# Patient Record
Sex: FEMALE | Race: ASIAN | NOT HISPANIC OR LATINO
[De-identification: names, ages, dates, MRNs, and addresses within clinical notes are randomized per-mention and may not be internally consistent; named-entity substitution may affect disease eponyms.]

---

## 2022-07-08 ENCOUNTER — LABORATORY RESULT (OUTPATIENT)
Age: 32
End: 2022-07-08

## 2022-07-08 ENCOUNTER — APPOINTMENT (OUTPATIENT)
Dept: RHEUMATOLOGY | Facility: CLINIC | Age: 32
End: 2022-07-08

## 2022-07-08 VITALS
OXYGEN SATURATION: 99 % | HEART RATE: 77 BPM | WEIGHT: 130 LBS | DIASTOLIC BLOOD PRESSURE: 75 MMHG | SYSTOLIC BLOOD PRESSURE: 111 MMHG | RESPIRATION RATE: 16 BRPM | TEMPERATURE: 98 F | BODY MASS INDEX: 23.92 KG/M2 | HEIGHT: 62 IN

## 2022-07-08 PROCEDURE — 99205 OFFICE O/P NEW HI 60 MIN: CPT

## 2022-07-09 LAB
25(OH)D3 SERPL-MCNC: 36.8 NG/ML
ALBUMIN SERPL ELPH-MCNC: 4.3 G/DL
ALP BLD-CCNC: 50 U/L
ALT SERPL-CCNC: 17 U/L
ANION GAP SERPL CALC-SCNC: 15 MMOL/L
APPEARANCE: CLEAR
APTT BLD: 33.9 SEC
AST SERPL-CCNC: 22 U/L
BACTERIA: NEGATIVE
BASOPHILS # BLD AUTO: 0.03 K/UL
BASOPHILS NFR BLD AUTO: 0.5 %
BILIRUB SERPL-MCNC: 0.3 MG/DL
BILIRUBIN URINE: NEGATIVE
BLOOD URINE: NEGATIVE
BUN SERPL-MCNC: 20 MG/DL
CALCIUM SERPL-MCNC: 9.3 MG/DL
CHLORIDE SERPL-SCNC: 107 MMOL/L
CHOLEST SERPL-MCNC: 174 MG/DL
CK SERPL-CCNC: 146 U/L
CO2 SERPL-SCNC: 19 MMOL/L
COLOR: NORMAL
CREAT SERPL-MCNC: 1.4 MG/DL
CREAT SPEC-SCNC: 49 MG/DL
CREAT/PROT UR: 0.3 RATIO
EGFR: 52 ML/MIN/1.73M2
EOSINOPHIL # BLD AUTO: 0.14 K/UL
EOSINOPHIL NFR BLD AUTO: 2.5 %
ESTIMATED AVERAGE GLUCOSE: 91 MG/DL
FOLATE SERPL-MCNC: >20 NG/ML
GLUCOSE QUALITATIVE U: NEGATIVE
GLUCOSE SERPL-MCNC: 78 MG/DL
HAPTOGLOB SERPL-MCNC: 49 MG/DL
HBA1C MFR BLD HPLC: 4.8 %
HCT VFR BLD CALC: 32.3 %
HDLC SERPL-MCNC: 67 MG/DL
HGB BLD-MCNC: 11.1 G/DL
HYALINE CASTS: 2 /LPF
IMM GRANULOCYTES NFR BLD AUTO: 0.5 %
INR PPP: 0.9 RATIO
IRON SATN MFR SERPL: 32 %
IRON SERPL-MCNC: 103 UG/DL
KETONES URINE: NEGATIVE
LDH SERPL-CCNC: 253 U/L
LDLC SERPL CALC-MCNC: 67 MG/DL
LEUKOCYTE ESTERASE URINE: NEGATIVE
LYMPHOCYTES # BLD AUTO: 1.03 K/UL
LYMPHOCYTES NFR BLD AUTO: 18.1 %
MAN DIFF?: NORMAL
MCHC RBC-ENTMCNC: 32.9 PG
MCHC RBC-ENTMCNC: 34.4 GM/DL
MCV RBC AUTO: 95.8 FL
MICROSCOPIC-UA: NORMAL
MONOCYTES # BLD AUTO: 0.3 K/UL
MONOCYTES NFR BLD AUTO: 5.3 %
NEUTROPHILS # BLD AUTO: 4.16 K/UL
NEUTROPHILS NFR BLD AUTO: 73.1 %
NITRITE URINE: NEGATIVE
NONHDLC SERPL-MCNC: 107 MG/DL
PH URINE: 6
PLATELET # BLD AUTO: 207 K/UL
POTASSIUM SERPL-SCNC: 4.5 MMOL/L
PROT SERPL-MCNC: 6.8 G/DL
PROT UR-MCNC: 14 MG/DL
PROTEIN URINE: NORMAL
PT BLD: 10.6 SEC
RBC # BLD: 3.37 M/UL
RBC # BLD: 3.37 M/UL
RBC # FLD: 13.8 %
RED BLOOD CELLS URINE: 4 /HPF
RETICS # AUTO: 1.8 %
RETICS AGGREG/RBC NFR: 61.8 K/UL
RHEUMATOID FACT SER QL: 11 IU/ML
SODIUM SERPL-SCNC: 140 MMOL/L
SPECIFIC GRAVITY URINE: 1.01
SQUAMOUS EPITHELIAL CELLS: 2 /HPF
TIBC SERPL-MCNC: 325 UG/DL
TRIGL SERPL-MCNC: 202 MG/DL
TSH SERPL-ACNC: 0.38 UIU/ML
UIBC SERPL-MCNC: 222 UG/DL
UROBILINOGEN URINE: NORMAL
VIT B12 SERPL-MCNC: 413 PG/ML
WBC # FLD AUTO: 5.69 K/UL
WHITE BLOOD CELLS URINE: 2 /HPF

## 2022-07-10 LAB
B2 GLYCOPROT1 IGA SERPL IA-ACNC: <5 SAU
B2 GLYCOPROT1 IGG SER-ACNC: <5 SGU
B2 GLYCOPROT1 IGM SER-ACNC: <5 SMU
C3 SERPL-MCNC: 109 MG/DL
C4 SERPL-MCNC: 26 MG/DL
CARDIOLIPIN IGM SER-MCNC: 6.3 GPL
DEPRECATED CARDIOLIPIN IGA SER: <5 APL
MYELOPEROXIDASE AB SER QL IA: <5 UNITS
MYELOPEROXIDASE CELLS FLD QL: NEGATIVE
PROTEINASE3 AB SER IA-ACNC: <5 UNITS
PROTEINASE3 AB SER-ACNC: NEGATIVE

## 2022-07-11 LAB
ANA SER IF-ACNC: NEGATIVE
CONFIRM: 31 SEC
DIRECT COOMBS: NORMAL
DRVVT IMM 1:2 NP PPP: NORMAL
DRVVT SCREEN TO CONFIRM RATIO: 1.13 RATIO
PS IGA SER QL: <1
PS IGG SER QL: 18 UNITS
PS IGM SER QL: 33 UNITS
SCREEN DRVVT: 41.4 SEC
SILICA CLOTTING TIME INTERPRETATION: ABNORMAL
SILICA CLOTTING TIME S/C: 1.31 RATIO

## 2022-07-12 LAB
CCP AB SER IA-ACNC: <8 UNITS
CHROMATIN AB SERPL-ACNC: <0.2 AL
DSDNA AB SER-ACNC: <12 IU/ML
ENA RNP AB SER IA-ACNC: <0.2 AL
ENA SM AB SER IA-ACNC: <0.2 AL
ENA SS-A AB SER IA-ACNC: <0.2 AL
ENA SS-B AB SER IA-ACNC: <0.2 AL
RF+CCP IGG SER-IMP: NEGATIVE

## 2022-07-13 LAB — CARDIOLIPIN IGM SER-MCNC: 11.5 MPL

## 2022-07-20 ENCOUNTER — APPOINTMENT (OUTPATIENT)
Dept: OBGYN | Facility: CLINIC | Age: 32
End: 2022-07-20

## 2022-07-20 ENCOUNTER — ASOB RESULT (OUTPATIENT)
Age: 32
End: 2022-07-20

## 2022-07-20 VITALS
SYSTOLIC BLOOD PRESSURE: 124 MMHG | BODY MASS INDEX: 23.92 KG/M2 | HEIGHT: 63 IN | WEIGHT: 135 LBS | DIASTOLIC BLOOD PRESSURE: 72 MMHG

## 2022-07-20 PROCEDURE — 76801 OB US < 14 WKS SINGLE FETUS: CPT | Mod: 59

## 2022-07-20 PROCEDURE — 76813 OB US NUCHAL MEAS 1 GEST: CPT

## 2022-07-20 PROCEDURE — 0500F INITIAL PRENATAL CARE VISIT: CPT

## 2022-07-20 PROCEDURE — 36415 COLL VENOUS BLD VENIPUNCTURE: CPT

## 2022-07-26 ENCOUNTER — APPOINTMENT (OUTPATIENT)
Dept: RHEUMATOLOGY | Facility: CLINIC | Age: 32
End: 2022-07-26

## 2022-07-26 ENCOUNTER — APPOINTMENT (OUTPATIENT)
Dept: NEPHROLOGY | Facility: CLINIC | Age: 32
End: 2022-07-26

## 2022-07-26 VITALS
SYSTOLIC BLOOD PRESSURE: 119 MMHG | HEART RATE: 71 BPM | BODY MASS INDEX: 24.34 KG/M2 | WEIGHT: 137.35 LBS | TEMPERATURE: 97.7 F | OXYGEN SATURATION: 99 % | HEIGHT: 63 IN | DIASTOLIC BLOOD PRESSURE: 77 MMHG

## 2022-07-26 DIAGNOSIS — Z83.3 FAMILY HISTORY OF DIABETES MELLITUS: ICD-10-CM

## 2022-07-26 DIAGNOSIS — Z82.0 FAMILY HISTORY OF EPILEPSY AND OTHER DISEASES OF THE NERVOUS SYSTEM: ICD-10-CM

## 2022-07-26 PROCEDURE — 99204 OFFICE O/P NEW MOD 45 MIN: CPT

## 2022-07-26 RX ORDER — METHYLDOPA 500 MG
160 (50 FE) TABLET ORAL
Refills: 0 | Status: ACTIVE | COMMUNITY
Start: 2022-07-26

## 2022-07-26 RX ORDER — ERGOCALCIFEROL (VITAMIN D2) 10 MCG
27-0.8 TABLET ORAL
Refills: 0 | Status: ACTIVE | COMMUNITY
Start: 2022-07-26

## 2022-07-26 NOTE — HISTORY OF PRESENT ILLNESS
[FreeTextEntry1] : 31 year old female\par Moved from [place] one month ago\par h/o lupus nephritis\par Lupus hx: In August of 2019 she was hospitalized in [facility] for few hours. She had BP 200s, fevers, elevated creatinine (May 2019 [facility] to [place]) admitted - thought to be secondary to viral or stress as she was travelling back and forth between [facility] and [place]. \par In Dec 2019 she was detected to have proteinuria by GYN; Saw Nephrology at [facility] Dr. Melanie Yang. She reports her Cr 2.3 initially; states approx 10g ? proteinuria. Had edema. BP elevated at that time. Reports lupus serology neg; had renal biopsy Aug 2020 which showed Lupus Nephritis Class 3; \par Initially she was started on steroids which were tapered 60mg x 3 weeks, 40x 3 weeks, 20 x3 weeks Sept 2020,tapered off by April 2021.  She was also started on tacro and MMF.  Tacrolimus was 3/3 tapered and she was weaned off of it summer 2021 due to concern for 'interstitial nephritis.'  Was on Mycophenolate 2 tabs bid which she didn’t tolerate due to GI side effects, and was changed to Myfortic with which she still had diarrhea, vomiting; Then transitioned 3 mos later to myfortic 320mg bid which she didn’t tolerate and it was  switched to Imuran in March 2021.  \par Rheum: Danny William; \par \par Creatinine hx per pt: \par 2019 cr 1.7-1.9\par 2020 cr 2.3 at time of biopsy\par cr downtrended with treatment 1.9 Dec 2020\par cr 1.5 2021 (3 tabs aza)\par cr 1.6-1.7 2022 when aza decreased and approx for one year it has been stable \par \par Proteinuria : Dec 2020 reports resolution of proteinuria 0.1\par june 2020 prot/cr 1.5g\par dec 29 2020 prot.cr 0.1\par june 2022 0.1\par \par HTN history: Was on nifedipine which caused her HR to go up to 170s. Then switched to amlodipine 5mg and lisinopril 2.5mg daily which controlled her BP.  She was taken off Lisinopril  beginning of the year Jan/ Feb 2022and amlodipine was increased to 10mg daily (which she is on currently).  Was on Labetolol 100mg bid; then 50mg bid bc of low HR 60s, uneasiness; Now on Labetalol 100mg daily\par She checks BP 3x/week randomly and reports BPs 120/80 with medications and 130/90 pre meds\par HR 80s\par  \par Pregnancy hx: This is her first pregnancy; Denies miscarriages/abortions. Found out she was pregnant in June 2022; Currently 12 weeks gestation. 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

## 2022-07-26 NOTE — PHYSICAL EXAM
[General Appearance - Alert] : alert [General Appearance - In No Acute Distress] : in no acute distress [General Appearance - Well Nourished] : well nourished [General Appearance - Well Developed] : well developed [Sclera] : the sclera and conjunctiva were normal [Outer Ear] : the ears and nose were normal in appearance [Neck Appearance] : the appearance of the neck was normal [Respiration, Rhythm And Depth] : normal respiratory rhythm and effort [Auscultation Breath Sounds / Voice Sounds] : lungs were clear to auscultation bilaterally [Apical Impulse] : the apical impulse was normal [Heart Rate And Rhythm] : heart rate was normal and rhythm regular [Heart Sounds] : normal S1 and S2 [Edema] : there was no peripheral edema [Bowel Sounds] : normal bowel sounds [Abdomen Tenderness] : non-tender [Abdomen Soft] : soft [No CVA Tenderness] : no ~M costovertebral angle tenderness [Abnormal Walk] : normal gait [Skin Color & Pigmentation] : normal skin color and pigmentation [Skin Turgor] : normal skin turgor [] : no rash [No Focal Deficits] : no focal deficits [Oriented To Time, Place, And Person] : oriented to person, place, and time [Impaired Insight] : insight and judgment were intact [Affect] : the affect was normal

## 2022-07-26 NOTE — REVIEW OF SYSTEMS
IPSS Questionnaire (AUA-7): Over the past month    1)  How often have you had a sensation of not emptying your bladder completely after you finish urinating? 1 - Less than 1 time in 5   2)  How often have you had to urinate again less than two hours after you finished urinating? 2 - Less than half the time   3)  How often have you found you stopped and started again several times when you urinated? 1 - Less than 1 time in 5   4) How difficult have you found it to postpone urination? 2 - Less than half the time   5) How often have you had a weak urinary stream?  0 - Not at all   6) How often have you had to push or strain to begin urination? 1 - Less than 1 time in 5   7) How many times did you most typically get up to urinate from the time you went to bed until the time you got up in the morning?   1 - 1 time   Total Score:  8 [Negative] : Heme/Lymph

## 2022-07-26 NOTE — ASSESSMENT
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; global gs 56% ; focal seg 22%; IFTA mod-severe 50%; mild AS; 15/27 gs glomeruli; full house EM\par -Creatinine hx per pt: \par 2019 cr 1.7-1.9\par 2020 cr 2.3 at time of biopsy\par cr downtrended with treatment 1.9 Dec 2020\par cr 1.5 2021 (3 tabs aza)\par cr 1.6-1.7 2022 when aza decreased and approx for one year it has been stable \par -cr July 9, 2022 1.4 \par -cont plaquenil, Imuran 100mg/day\par \par #proteinuria\par june 2020 prot/cr 1.5g\par dec 29 2020 prot.cr 0.1\par june 2022 0.1\par july 2022 0.3 \par repeat ua, prot/cr today for trend\par pla2r,anca, c3,c4 nl in past; cecilia 1:160 in past\par serology sent at dr forrest office July 2022- mild phosphotidylserine\par \par #HTN- she will monitor BP as she is now; if she notices any elevated BP she will check it more frequently and inform us if elevated\par BP stable today\par on Labetolol 100mg daily and Amlodipine 10mg daily which she takes in evening\par \par #high risk pregnancy- understands of risks of renal dz worsening during pregnancy and risks of pregnancy being complicated by pec or other poor fetal or maternal outcomes\par she is on 1.5tabs of  Asa for PEC prophylaxis\par monitor PEC labs/sx\par \par

## 2022-07-27 ENCOUNTER — TRANSCRIPTION ENCOUNTER (OUTPATIENT)
Age: 32
End: 2022-07-27

## 2022-07-27 LAB
HGB A MFR BLD: 95.8 %
HGB A2 MFR BLD: 2.3 %
HGB F MFR BLD: 1.9 %
HGB FRACT BLD-IMP: NORMAL
LEAD BLD-MCNC: <1 UG/DL

## 2022-07-28 ENCOUNTER — APPOINTMENT (OUTPATIENT)
Dept: CARDIOLOGY | Facility: CLINIC | Age: 32
End: 2022-07-28

## 2022-07-28 ENCOUNTER — NON-APPOINTMENT (OUTPATIENT)
Age: 32
End: 2022-07-28

## 2022-07-29 LAB
APPEARANCE: CLEAR
BACTERIA: NEGATIVE
BILIRUBIN URINE: NEGATIVE
BLOOD URINE: NEGATIVE
COLOR: NORMAL
CREAT SPEC-SCNC: 38 MG/DL
CREAT/PROT UR: 0.2 RATIO
GLUCOSE QUALITATIVE U: NEGATIVE
HYALINE CASTS: 0 /LPF
KETONES URINE: NEGATIVE
LEUKOCYTE ESTERASE URINE: NEGATIVE
MICROSCOPIC-UA: NORMAL
NITRITE URINE: NEGATIVE
PH URINE: 6
PROT UR-MCNC: 8 MG/DL
PROTEIN URINE: NEGATIVE
RED BLOOD CELLS URINE: 0 /HPF
SPECIFIC GRAVITY URINE: 1.01
SQUAMOUS EPITHELIAL CELLS: 0 /HPF
UROBILINOGEN URINE: NORMAL
WHITE BLOOD CELLS URINE: 0 /HPF

## 2022-08-11 ENCOUNTER — APPOINTMENT (OUTPATIENT)
Dept: CV DIAGNOSITCS | Facility: HOSPITAL | Age: 32
End: 2022-08-11

## 2022-08-11 ENCOUNTER — OUTPATIENT (OUTPATIENT)
Dept: OUTPATIENT SERVICES | Facility: HOSPITAL | Age: 32
LOS: 1 days | End: 2022-08-11
Payer: COMMERCIAL

## 2022-08-11 DIAGNOSIS — M32.9 SYSTEMIC LUPUS ERYTHEMATOSUS, UNSPECIFIED: ICD-10-CM

## 2022-08-11 PROCEDURE — 93356 MYOCRD STRAIN IMG SPCKL TRCK: CPT

## 2022-08-11 PROCEDURE — 93306 TTE W/DOPPLER COMPLETE: CPT | Mod: 26

## 2022-08-11 PROCEDURE — 93306 TTE W/DOPPLER COMPLETE: CPT

## 2022-08-22 ENCOUNTER — NON-APPOINTMENT (OUTPATIENT)
Age: 32
End: 2022-08-22

## 2022-08-22 ENCOUNTER — APPOINTMENT (OUTPATIENT)
Dept: OBGYN | Facility: CLINIC | Age: 32
End: 2022-08-22

## 2022-08-22 PROCEDURE — 36415 COLL VENOUS BLD VENIPUNCTURE: CPT

## 2022-08-22 PROCEDURE — 0502F SUBSEQUENT PRENATAL CARE: CPT

## 2022-08-23 ENCOUNTER — APPOINTMENT (OUTPATIENT)
Dept: ULTRASOUND IMAGING | Facility: CLINIC | Age: 32
End: 2022-08-23

## 2022-08-23 ENCOUNTER — APPOINTMENT (OUTPATIENT)
Dept: CARDIOLOGY | Facility: CLINIC | Age: 32
End: 2022-08-23

## 2022-08-23 ENCOUNTER — APPOINTMENT (OUTPATIENT)
Dept: NEPHROLOGY | Facility: CLINIC | Age: 32
End: 2022-08-23

## 2022-08-23 VITALS
HEIGHT: 63 IN | TEMPERATURE: 97.5 F | HEART RATE: 66 BPM | DIASTOLIC BLOOD PRESSURE: 77 MMHG | WEIGHT: 139.33 LBS | SYSTOLIC BLOOD PRESSURE: 118 MMHG | OXYGEN SATURATION: 99 % | BODY MASS INDEX: 24.69 KG/M2

## 2022-08-23 LAB
APPEARANCE: CLEAR
BILIRUBIN URINE: NEGATIVE
BLOOD URINE: NEGATIVE
COLOR: NORMAL
CREAT SPEC-SCNC: 16 MG/DL
CREAT/PROT UR: 0.7 RATIO
GLUCOSE QUALITATIVE U: NEGATIVE
KETONES URINE: NEGATIVE
LEUKOCYTE ESTERASE URINE: NEGATIVE
NITRITE URINE: NEGATIVE
PH URINE: 6.5
PROT UR-MCNC: 12 MG/DL
PROTEIN URINE: NEGATIVE
SPECIFIC GRAVITY URINE: 1.01
UROBILINOGEN URINE: NORMAL

## 2022-08-23 PROCEDURE — 99203 OFFICE O/P NEW LOW 30 MIN: CPT | Mod: 95

## 2022-08-23 PROCEDURE — 76775 US EXAM ABDO BACK WALL LIM: CPT

## 2022-08-23 PROCEDURE — 99215 OFFICE O/P EST HI 40 MIN: CPT

## 2022-08-23 NOTE — ASSESSMENT
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; global gs 56% ; focal seg 22%; IFTA mod-severe 50%; mild AS; 15/27 gs glomeruli; full house EM\par -Creatinine hx per pt: \par 2019 cr 1.7-1.9\par 2020 cr 2.3 at time of biopsy\par cr downtrended with treatment 1.9 Dec 2020\par cr 1.5 2021 (3 tabs aza)\par cr 1.6-1.7 2022 when aza decreased and approx for one year it has been stable \par -cr July 2022 1.4 \par -cont plaquenil, Imuran 100mg/day\par -check renal panel today\par \par #proteinuria- possible increase on yesterday Uprot/cr\par june 2020 prot/cr 1.5g\par dec 29 2020 prot.cr 0.1\par june 2022 0.1\par july 2022 0.3 \par Aug 22 prot/cr 0.7\par repeat ua, prot/cr today for trend\par possible mild increase yesterday- check u cx, renal sono to rule out other causes; could be lupus flare; check c3,c4,dsdna\par pla2r,anca, c3,c4 nl in past; cecilia 1:160 in past\par serology sent at dr forrest office July 2022- mild phosphotidylserine +\par \par #HTN- \par BP stable today\par on Labetolol 100mg daily and Amlodipine 10mg daily which she takes in evening\par she will monitor BP as she is now; if she notices any elevated BP she will check it more frequently and inform us if elevated\par \par #high risk pregnancy- \par she is on 1.5tabs of Asa for PEC prophylaxis\par monitor PEC labs/sx\par check PE labs today

## 2022-08-23 NOTE — PHYSICAL EXAM
[General Appearance - Alert] : alert [General Appearance - In No Acute Distress] : in no acute distress [General Appearance - Well Nourished] : well nourished [General Appearance - Well Developed] : well developed [Sclera] : the sclera and conjunctiva were normal [Outer Ear] : the ears and nose were normal in appearance [Neck Appearance] : the appearance of the neck was normal [Respiration, Rhythm And Depth] : normal respiratory rhythm and effort [Auscultation Breath Sounds / Voice Sounds] : lungs were clear to auscultation bilaterally [Apical Impulse] : the apical impulse was normal [Heart Rate And Rhythm] : heart rate was normal and rhythm regular [Heart Sounds] : normal S1 and S2 [Edema] : there was no peripheral edema [Bowel Sounds] : normal bowel sounds [Abdomen Soft] : soft [Abdomen Tenderness] : non-tender [No CVA Tenderness] : no ~M costovertebral angle tenderness [Abnormal Walk] : normal gait [Skin Color & Pigmentation] : normal skin color and pigmentation [Skin Turgor] : normal skin turgor [] : no rash [No Focal Deficits] : no focal deficits [Oriented To Time, Place, And Person] : oriented to person, place, and time [Impaired Insight] : insight and judgment were intact [Affect] : the affect was normal

## 2022-08-23 NOTE — HISTORY OF PRESENT ILLNESS
[FreeTextEntry1] : Pt is 16 weeks gestation\par Stated GF is sick with covid in hospital and may pass\par Last ob visit yesterday, had prot/cr done and increased\par Pt hydrates approx 2 L /day\par Bp at home p ranging 125/85-87 night; 110-115/70s in day; Still takes Labetolol and amlodipine at night\par c/o frothy urine since last visit\par c/o headache one week-two times a week\par Patient denies any blurred vision, double vision,  hand swelling, leg swelling, or RUQ pain\par Patient denies any burning or pain with urination.\par Other ROS neg\par

## 2022-08-24 ENCOUNTER — NON-APPOINTMENT (OUTPATIENT)
Age: 32
End: 2022-08-24

## 2022-08-24 ENCOUNTER — APPOINTMENT (OUTPATIENT)
Dept: CARDIOLOGY | Facility: CLINIC | Age: 32
End: 2022-08-24

## 2022-08-24 DIAGNOSIS — Z82.49 FAMILY HISTORY OF ISCHEMIC HEART DISEASE AND OTHER DISEASES OF THE CIRCULATORY SYSTEM: ICD-10-CM

## 2022-08-24 LAB
ALBUMIN SERPL ELPH-MCNC: 3.7 G/DL
ALBUMIN SERPL ELPH-MCNC: 3.7 G/DL
ALP BLD-CCNC: 44 U/L
ALT SERPL-CCNC: 11 U/L
ANION GAP SERPL CALC-SCNC: 13 MMOL/L
APPEARANCE: CLEAR
AST SERPL-CCNC: 17 U/L
BACTERIA UR CULT: NORMAL
BACTERIA: NEGATIVE
BASOPHILS # BLD AUTO: 0.03 K/UL
BASOPHILS NFR BLD AUTO: 0.5 %
BILIRUB DIRECT SERPL-MCNC: 0.1 MG/DL
BILIRUB INDIRECT SERPL-MCNC: 0.2 MG/DL
BILIRUB SERPL-MCNC: 0.2 MG/DL
BILIRUBIN URINE: NEGATIVE
BLOOD URINE: NEGATIVE
BUN SERPL-MCNC: 21 MG/DL
C3 SERPL-MCNC: 109 MG/DL
C4 SERPL-MCNC: 26 MG/DL
CALCIUM SERPL-MCNC: 9.4 MG/DL
CHLORIDE SERPL-SCNC: 104 MMOL/L
CO2 SERPL-SCNC: 20 MMOL/L
COLOR: NORMAL
COVID-19 NUCLEOCAPSID  GAM ANTIBODY INTERPRETATION: NEGATIVE
COVID-19 SPIKE DOMAIN ANTIBODY INTERPRETATION: POSITIVE
CREAT SERPL-MCNC: 1.39 MG/DL
CREAT SPEC-SCNC: 67 MG/DL
CREAT/PROT UR: 0.2 RATIO
DSDNA AB SER-ACNC: <12 IU/ML
EGFR: 53 ML/MIN/1.73M2
EOSINOPHIL # BLD AUTO: 0.15 K/UL
EOSINOPHIL NFR BLD AUTO: 2.5 %
FERRITIN SERPL-MCNC: 26 NG/ML
GLUCOSE QUALITATIVE U: NEGATIVE
GLUCOSE SERPL-MCNC: 87 MG/DL
HAPTOGLOB SERPL-MCNC: <20 MG/DL
HCT VFR BLD CALC: 27.7 %
HGB BLD-MCNC: 9.8 G/DL
HYALINE CASTS: 3 /LPF
IMM GRANULOCYTES NFR BLD AUTO: 0.5 %
IRON SATN MFR SERPL: 38 %
IRON SERPL-MCNC: 115 UG/DL
KETONES URINE: NEGATIVE
LDH SERPL-CCNC: 237 U/L
LEUKOCYTE ESTERASE URINE: NEGATIVE
LYMPHOCYTES # BLD AUTO: 0.96 K/UL
LYMPHOCYTES NFR BLD AUTO: 15.8 %
MAGNESIUM SERPL-MCNC: 1.8 MG/DL
MAN DIFF?: NORMAL
MCHC RBC-ENTMCNC: 33.6 PG
MCHC RBC-ENTMCNC: 35.4 GM/DL
MCV RBC AUTO: 94.9 FL
MICROSCOPIC-UA: NORMAL
MONOCYTES # BLD AUTO: 0.36 K/UL
MONOCYTES NFR BLD AUTO: 5.9 %
NEUTROPHILS # BLD AUTO: 4.56 K/UL
NEUTROPHILS NFR BLD AUTO: 74.8 %
NITRITE URINE: NEGATIVE
PH URINE: 6
PHOSPHATE SERPL-MCNC: 4.7 MG/DL
PLATELET # BLD AUTO: 179 K/UL
POTASSIUM SERPL-SCNC: 4.5 MMOL/L
PROT SERPL-MCNC: 6.2 G/DL
PROT UR-MCNC: 11 MG/DL
PROTEIN URINE: NORMAL
RBC # BLD: 2.92 M/UL
RBC # FLD: 15 %
RED BLOOD CELLS URINE: 0 /HPF
SARS-COV-2 AB SERPL IA-ACNC: 160 U/ML
SARS-COV-2 AB SERPL QL IA: 0.08 INDEX
SODIUM SERPL-SCNC: 138 MMOL/L
SPECIFIC GRAVITY URINE: 1.01
SQUAMOUS EPITHELIAL CELLS: 1 /HPF
TIBC SERPL-MCNC: 307 UG/DL
UIBC SERPL-MCNC: 192 UG/DL
URATE SERPL-MCNC: 7.5 MG/DL
UROBILINOGEN URINE: NORMAL
WBC # FLD AUTO: 6.09 K/UL
WHITE BLOOD CELLS URINE: 0 /HPF

## 2022-08-24 PROCEDURE — 99203 OFFICE O/P NEW LOW 30 MIN: CPT | Mod: 95

## 2022-08-25 LAB — BACTERIA UR CULT: NORMAL

## 2022-08-30 ENCOUNTER — APPOINTMENT (OUTPATIENT)
Dept: RHEUMATOLOGY | Facility: CLINIC | Age: 32
End: 2022-08-30

## 2022-08-30 ENCOUNTER — NON-APPOINTMENT (OUTPATIENT)
Age: 32
End: 2022-08-30

## 2022-09-01 LAB — AFP PNL SERPL: NORMAL

## 2022-09-04 PROBLEM — Z82.49 FAMILY HISTORY OF HYPERTENSION: Status: ACTIVE | Noted: 2022-07-26

## 2022-09-07 ENCOUNTER — NON-APPOINTMENT (OUTPATIENT)
Age: 32
End: 2022-09-07

## 2022-09-07 ENCOUNTER — APPOINTMENT (OUTPATIENT)
Dept: OBGYN | Facility: CLINIC | Age: 32
End: 2022-09-07

## 2022-09-08 ENCOUNTER — NON-APPOINTMENT (OUTPATIENT)
Age: 32
End: 2022-09-08

## 2022-09-08 ENCOUNTER — APPOINTMENT (OUTPATIENT)
Dept: OBGYN | Facility: CLINIC | Age: 32
End: 2022-09-08

## 2022-09-08 PROCEDURE — 0502F SUBSEQUENT PRENATAL CARE: CPT

## 2022-09-16 ENCOUNTER — APPOINTMENT (OUTPATIENT)
Dept: PEDIATRIC CARDIOLOGY | Facility: CLINIC | Age: 32
End: 2022-09-16

## 2022-09-19 ENCOUNTER — APPOINTMENT (OUTPATIENT)
Dept: PEDIATRIC CARDIOLOGY | Facility: CLINIC | Age: 32
End: 2022-09-19

## 2022-09-19 ENCOUNTER — ASOB RESULT (OUTPATIENT)
Age: 32
End: 2022-09-19

## 2022-09-19 ENCOUNTER — APPOINTMENT (OUTPATIENT)
Dept: ANTEPARTUM | Facility: CLINIC | Age: 32
End: 2022-09-19

## 2022-09-19 PROCEDURE — 93325 DOPPLER ECHO COLOR FLOW MAPG: CPT

## 2022-09-19 PROCEDURE — 76827 ECHO EXAM OF FETAL HEART: CPT

## 2022-09-19 PROCEDURE — 76817 TRANSVAGINAL US OBSTETRIC: CPT

## 2022-09-19 PROCEDURE — 76811 OB US DETAILED SNGL FETUS: CPT

## 2022-09-19 PROCEDURE — 99203 OFFICE O/P NEW LOW 30 MIN: CPT | Mod: 25

## 2022-09-19 PROCEDURE — 76825 ECHO EXAM OF FETAL HEART: CPT

## 2022-09-20 ENCOUNTER — NON-APPOINTMENT (OUTPATIENT)
Age: 32
End: 2022-09-20

## 2022-09-21 ENCOUNTER — NON-APPOINTMENT (OUTPATIENT)
Age: 32
End: 2022-09-21

## 2022-09-21 ENCOUNTER — APPOINTMENT (OUTPATIENT)
Dept: OBGYN | Facility: CLINIC | Age: 32
End: 2022-09-21

## 2022-09-21 VITALS
BODY MASS INDEX: 26.05 KG/M2 | WEIGHT: 147 LBS | SYSTOLIC BLOOD PRESSURE: 119 MMHG | HEIGHT: 63 IN | DIASTOLIC BLOOD PRESSURE: 78 MMHG

## 2022-09-21 PROCEDURE — 0502F SUBSEQUENT PRENATAL CARE: CPT

## 2022-09-22 ENCOUNTER — NON-APPOINTMENT (OUTPATIENT)
Age: 32
End: 2022-09-22

## 2022-09-22 ENCOUNTER — APPOINTMENT (OUTPATIENT)
Dept: OBGYN | Facility: CLINIC | Age: 32
End: 2022-09-22

## 2022-09-22 LAB
CREAT SPEC-SCNC: 23 MG/DL
CREAT/PROT UR: 0.7 RATIO
PROT UR-MCNC: 15 MG/DL

## 2022-09-22 PROCEDURE — 59425 ANTEPARTUM CARE ONLY: CPT

## 2022-09-22 PROCEDURE — 0502F SUBSEQUENT PRENATAL CARE: CPT

## 2022-09-27 ENCOUNTER — APPOINTMENT (OUTPATIENT)
Dept: NEPHROLOGY | Facility: CLINIC | Age: 32
End: 2022-09-27

## 2022-09-27 VITALS
TEMPERATURE: 96.8 F | HEIGHT: 63 IN | DIASTOLIC BLOOD PRESSURE: 74 MMHG | WEIGHT: 148.81 LBS | HEART RATE: 77 BPM | OXYGEN SATURATION: 98 % | BODY MASS INDEX: 26.37 KG/M2 | SYSTOLIC BLOOD PRESSURE: 112 MMHG

## 2022-09-27 PROCEDURE — 99215 OFFICE O/P EST HI 40 MIN: CPT | Mod: GC

## 2022-09-27 NOTE — PHYSICAL EXAM
[General Appearance - Alert] : alert [General Appearance - In No Acute Distress] : in no acute distress [General Appearance - Well Nourished] : well nourished [General Appearance - Well Developed] : well developed [General Appearance - Well-Appearing] : healthy appearing [Sclera] : the sclera and conjunctiva were normal [Extraocular Movements] : extraocular movements were intact [Neck Appearance] : the appearance of the neck was normal [Jugular Venous Distention Increased] : there was no jugular-venous distention [] : no respiratory distress [Respiration, Rhythm And Depth] : normal respiratory rhythm and effort [Exaggerated Use Of Accessory Muscles For Inspiration] : no accessory muscle use [Auscultation Breath Sounds / Voice Sounds] : lungs were clear to auscultation bilaterally [Heart Rate And Rhythm] : heart rate was normal and rhythm regular [Heart Sounds] : normal S1 and S2 [Heart Sounds Gallop] : no gallops [Murmurs] : no murmurs [Heart Sounds Pericardial Friction Rub] : no pericardial rub [Edema] : there was no peripheral edema [Bowel Sounds] : normal bowel sounds [Abdomen Soft] : soft [No CVA Tenderness] : no ~M costovertebral angle tenderness [No Spinal Tenderness] : no spinal tenderness [Abnormal Walk] : normal gait [No Focal Deficits] : no focal deficits [Oriented To Time, Place, And Person] : oriented to person, place, and time [Impaired Insight] : insight and judgment were intact [Affect] : the affect was normal [Mood] : the mood was normal [FreeTextEntry1] : +gravid uterus

## 2022-09-27 NOTE — REVIEW OF SYSTEMS
[Feeling Tired] : feeling tired [Shortness Of Breath] : shortness of breath [SOB on Exertion] : shortness of breath during exertion [Negative] : Heme/Lymph [FreeTextEntry8] : +frothy urine

## 2022-09-27 NOTE — ASSESSMENT
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cr 0.1\par june 2022 0.1\par July 0.3\par July 0.2\par August 0.7  \par August 0.2\par Sept 0.7 \par repeat ua, prot/cr today for trend\par -Proteinuria currently stable/uptrend during pregnancy? will need to monitor the trend\par -check trend today\par - if uptrends and will consider increasing Imuran; Doubt PEC related as BP is stable\par - Pt has had negative lupus serologies in the past & lupus serologies currently -ve   \par -check c3,c4,dsdna today\par -pla2r,anca, c3,c4 nl in past; cecilia 1:160 in past\par \par #HTN- \par BP stable today\par on Labetolol 100mg daily and Amlodipine 10mg daily which she takes in evening\par she will monitor BP as she is now; if she notices any elevated BP she will check it more frequently and inform us if elevated\par \par  #Anemia-\par -Drop in Hg from 11 in July to 9.8 in Aug with hapto < 20 (normal bili & LDH). \par -?Hx of hemolytic anemia pre-pregnancy likely from lupus. \par -Scheduled appointment for next week 10/5 with Dr. Troy Gomez. \par - need to rule out low grade TMA; would prefer heme to look at peripheral blood smear to look for schistocytes\par -Check ADAMTS 13 today, APLs labs done in past\par -Continue PO iron bid\par -Check iron studies, CBC, hemolysis labs today\par \par #high risk pregnancy- \par -she is on 1.5tabs of Asa for PEC prophylaxis\par -serology sent at dr forrest office July 2022- mild phosphotidylserine + and + silica clotting but neg DRVVT\par -monitor PEC labs/sx\par \par d/w patient and with pt's  over phone in detail regarding above plan\par all questions answered\par spent>45 min counseling/reviewing chart notes\par

## 2022-09-27 NOTE — HISTORY OF PRESENT ILLNESS
[FreeTextEntry1] : 31 year old female with h/o lupus nephritis class III, with CKD, now 21 weeks gestation here for continued pregnancy follow up\par \par Last seen on 8/23/22. Pt is 21 weeks pregnant. Last [facility] visit was last Wednesday which went okay. All fetal scans have been good. Fetal echo was good. Last anti-SSA & SSB abs -ve. \par Last dipstick protein was 700 mg at the [facility] office. No bacturia at [facility] office. Last UP/C ratio was 0.2 g/g with us in August (0.7 g/g <--0.3 in July)\par \par Last SCr was 1.3 on 8/23/22 (SCr 1.4 in July '22).( Baseline SCr is 1.4-1.7). Renal sono showed 0.7 cm cyst but no hydro. PEC labs were negative. \par \par Saw cardio on 8/24/22 without any changes in meds.\par No hospitalizations or illness since last visit. Rheum appointment coming up. No new med changes since last visit. Last rheum visit went well. Lupus quiescent. Lupus serology remains negative. Remains on plaquenil 200 mg daily & imuran 100 mg daily.  \par \par [facility] wants pt to see Hematology due to drop in Hg from 11 in July to 9.8 in Aug with hapto < 20 (normal bili & LDH).  Scheduled appointment for next week 10/5 with Dr. Troy Gomez. Also with Silica clotting time elevated to 1.3 with LA +ve but DRVVT -ve. Remains on ASA 81 mg (1.5 tabs) daily for this & for PEC ppx. PEC labs remains negative except serum uric acid elevated at 7.5 likely from low CrCl. \par \par Pt hydrates approx 2 L /day\par BP at home ranging 110/70 in AMs. 125/85 on PM pre-med.  Still takes Labetolol 100 qD and amlodipine 10 mg qD at night. BP in office was 112/74\par \par Pt has been very tired & complains of SOB on exertion. \par c/o frothy urine which is worse since last visit & increased frequency. \par c/o headache one week-two times a week, which has been happening\par GERD getting worse with pregnancy, on omeprazole\par Patient denies any blurred vision, double vision, hand swelling, leg swelling, hematuria, rashes, dysuria, or RUQ pain.\par Other ROS neg

## 2022-09-28 ENCOUNTER — NON-APPOINTMENT (OUTPATIENT)
Age: 32
End: 2022-09-28

## 2022-09-29 ENCOUNTER — OUTPATIENT (OUTPATIENT)
Dept: OUTPATIENT SERVICES | Facility: HOSPITAL | Age: 32
LOS: 1 days | Discharge: HOME | End: 2022-09-29

## 2022-09-29 DIAGNOSIS — D64.9 ANEMIA, UNSPECIFIED: ICD-10-CM

## 2022-09-30 ENCOUNTER — NON-APPOINTMENT (OUTPATIENT)
Age: 32
End: 2022-09-30

## 2022-09-30 LAB
ALBUMIN SERPL ELPH-MCNC: 3.8 G/DL
ALBUMIN SERPL ELPH-MCNC: 3.8 G/DL
ALP BLD-CCNC: 56 U/L
ALT SERPL-CCNC: 9 U/L
ANION GAP SERPL CALC-SCNC: 13 MMOL/L
APPEARANCE: CLEAR
AST SERPL-CCNC: 17 U/L
BACTERIA: ABNORMAL
BASOPHILS # BLD AUTO: 0.02 K/UL
BASOPHILS NFR BLD AUTO: 0.3 %
BILIRUB DIRECT SERPL-MCNC: 0.1 MG/DL
BILIRUB INDIRECT SERPL-MCNC: 0.2 MG/DL
BILIRUB SERPL-MCNC: 0.2 MG/DL
BILIRUBIN URINE: NEGATIVE
BLOOD URINE: NEGATIVE
BUN SERPL-MCNC: 22 MG/DL
CALCIUM SERPL-MCNC: 8.9 MG/DL
CHLORIDE SERPL-SCNC: 104 MMOL/L
CO2 SERPL-SCNC: 18 MMOL/L
COLOR: NORMAL
CREAT SERPL-MCNC: 1.43 MG/DL
CREAT SPEC-SCNC: 85 MG/DL
CREAT/PROT UR: 0.2 RATIO
EGFR: 51 ML/MIN/1.73M2
EOSINOPHIL # BLD AUTO: 0.11 K/UL
EOSINOPHIL NFR BLD AUTO: 1.7 %
FERRITIN SERPL-MCNC: 23 NG/ML
GLUCOSE QUALITATIVE U: NEGATIVE
GLUCOSE SERPL-MCNC: 89 MG/DL
HAPTOGLOB SERPL-MCNC: <20 MG/DL
HCT VFR BLD CALC: 28.3 %
HGB BLD-MCNC: 9.6 G/DL
HYALINE CASTS: 0 /LPF
IMM GRANULOCYTES NFR BLD AUTO: 0.8 %
IRON SATN MFR SERPL: 24 %
IRON SERPL-MCNC: 95 UG/DL
KETONES URINE: NEGATIVE
LDH SERPL-CCNC: 242 U/L
LEUKOCYTE ESTERASE URINE: NEGATIVE
LYMPHOCYTES # BLD AUTO: 1 K/UL
LYMPHOCYTES NFR BLD AUTO: 15.1 %
MAN DIFF?: NORMAL
MCHC RBC-ENTMCNC: 33.9 GM/DL
MCHC RBC-ENTMCNC: 34.4 PG
MCV RBC AUTO: 101.4 FL
MICROSCOPIC-UA: NORMAL
MONOCYTES # BLD AUTO: 0.42 K/UL
MONOCYTES NFR BLD AUTO: 6.4 %
NEUTROPHILS # BLD AUTO: 5.01 K/UL
NEUTROPHILS NFR BLD AUTO: 75.7 %
NITRITE URINE: NEGATIVE
PH URINE: 6.5
PHOSPHATE SERPL-MCNC: 3.9 MG/DL
PLATELET # BLD AUTO: 182 K/UL
POTASSIUM SERPL-SCNC: 4.9 MMOL/L
PROT SERPL-MCNC: 6.2 G/DL
PROT UR-MCNC: 14 MG/DL
PROTEIN URINE: NORMAL
RBC # BLD: 2.79 M/UL
RBC # FLD: 15 %
RED BLOOD CELLS URINE: 4 /HPF
SODIUM SERPL-SCNC: 136 MMOL/L
SPECIFIC GRAVITY URINE: 1.01
SQUAMOUS EPITHELIAL CELLS: 8 /HPF
TIBC SERPL-MCNC: 388 UG/DL
UIBC SERPL-MCNC: 293 UG/DL
URATE SERPL-MCNC: 8.1 MG/DL
UROBILINOGEN URINE: NORMAL
WBC # FLD AUTO: 6.61 K/UL
WHITE BLOOD CELLS URINE: 5 /HPF

## 2022-09-30 NOTE — HISTORY OF PRESENT ILLNESS
[Home] : at home, [unfilled] , at the time of the visit. [Other Location: e.g. Home (Enter Location, City,State)___] : at [unfilled] [Verbal consent obtained from patient] : the patient, [unfilled] [FreeTextEntry1] : Ms. Mclaughlin is a 31 year old female that [facility] for a cardiovascular assessment and evaluation during her current high risk  pregnancy.\par \par Ms Mclaughlin is 16 weeks pregnant.\par \par DUE DATE:   2023\par LMP:            April 3, 2022\par \par +Family history of type 2 diabetes, hypertension, sleep apnea, preeclampsia and non Hodgkins Lymphoma\par \par She carries a personal medical history of hypertension, SLE and lupus nephritis -> confirmed diagnosis in 2020 with a renal biopsy. which showed Lupus Nephritis Class 3.\par \par Initially, back in 2019, patient was hospitalized in [facility] for a few hours. She had a blood pressure in the 200s, fevers and an elevated creatinine. It was thought at that time to be some secondary virus due to stress.\par \par In 2019, she was detected to have proteinuria by her GYN. Saw Nephrology at [facility] by Dr. Muriel Yang with a serum creatinine of 2.3, lower extremity edema and an elevated BP.\par \par Lupus serology had been negative, then a renal biopsy as noted above in 2020 showed Lupus Nephritis Class 3. Treated with steroids and started on Tacrolimus and Mycophenolate.\par \par The Tacrolimus was tapered and weaned off in the summer of 2021 due to concerns for "interstitial nephritis".\par She was on Mycophenolate (2) tabs BID which she did not tolerate due to GI side effects and was changed to Myfortic with which she had diarrhea and vomiting.\par \par Then, transitioned 3 months later to Myfortic 320 mg BID which she didn't tolerate and was switched to Imuran in 2021. \par \par Now that patient is pregnant, she is taking Plaquenil and Azathioprine-> closely followed and supervised by Dr. Sarah Wilson, nephrologist. \par \par *Reviewed [employer] notes, patient's blood pressures have been runnin/85 at night and 110-115/70s during the day time hours.\par Blood pressures are controlled on Labetalol 100 mg QD and Amlodipine 10 mg QD ( Did not tolerate Nifedipine)\par \par *Reviewed recent echocardiogram performed on 2022:\par   Results:\par   LVEF 75%\par   Normal LV internal dimensions and wall thicknesses\par   Normal LV systolic function\par   No segmental wall motion abnormalities\par   Normal global stain  : -22.5%.\par   Normal RV size and function\par \par *Reviewed Renal Ultrasound performed on 2022\par   Normal study\par \par Patient reports feeling well and denies any active shortness of breath, chest discomfort or palpitations. \par \par \par

## 2022-09-30 NOTE — ASSESSMENT
[FreeTextEntry1] : Ms. Mclaughlin is a 31 year old female that presents to [facility] for a cardiovascular assessment and evaluation during her current high risk  pregnancy.\par \par Ms Mclaughlin is 16 weeks pregnant.\par \par DUE DATE:   February 5, 2023\par LMP:            April 3, 2022\par \par +Family history of type 2 diabetes, hypertension, sleep apnea, preeclampsia and non Hodgkins Lymphoma\par \par She carries a personal medical history of hypertension, SLE, CKD and lupus nephritis -> confirmed diagnosis in August of 2020 with a renal biopsy. which showed Lupus Nephritis Class 3.\par \par #Hypertension\par   Blood pressures are in good control at this time\par   Continue on Labetalol and Amlodipine\par   Daily BP log requested in one week for review.\par   Notify if BP rises above 140/ 90 for medication titration\par \par #Lupus Nephritis with CKD\par    Class lll\par    Followed by Nephrologist, Dr. Sarah Wilson\par    \par    Creatinine History\par    2019:    1.7-1.9\par    2020:     2.3 at the time of biopsy\par    2021:     down trended with treatment to 1.9\par    2022:     1.4\par \par    Currently treated with Plaquenil 200 mg QD and Imuran 100 mg QD\par \par #Proteinuria\par   August 2022    protein/cr   .7\par   Normal renal ultrasound-> performed on 8.23.22\par \par - Encouraged patient to participate mild  exercise and eating habits, focusing on a Mediterranean style of eating and aiming for the recommended 150 minutes per week of moderate physical activity.\par \par - Encouraged the patient to find healthy outlets and coping mechanisms to help manage stress, such as physical activity/exercise, reducing workload if possible, spending time with family and friends, engaging in an enjoyable hobby, or using meditation or mindfulness techniques.\par \par Follow up in 6-8 weeks. \par \par \par \par \par   \par   \par

## 2022-10-03 ENCOUNTER — NON-APPOINTMENT (OUTPATIENT)
Age: 32
End: 2022-10-03

## 2022-10-03 ENCOUNTER — APPOINTMENT (OUTPATIENT)
Dept: OBGYN | Facility: CLINIC | Age: 32
End: 2022-10-03

## 2022-10-03 VITALS
DIASTOLIC BLOOD PRESSURE: 68 MMHG | SYSTOLIC BLOOD PRESSURE: 122 MMHG | WEIGHT: 147 LBS | BODY MASS INDEX: 26.05 KG/M2 | HEIGHT: 63 IN

## 2022-10-03 LAB
AD13ACT COM: NORMAL
ADAMTS13 ACTIVITY: >100 %

## 2022-10-03 PROCEDURE — 0502F SUBSEQUENT PRENATAL CARE: CPT

## 2022-10-05 ENCOUNTER — OUTPATIENT (OUTPATIENT)
Dept: OUTPATIENT SERVICES | Facility: HOSPITAL | Age: 32
LOS: 1 days | Discharge: HOME | End: 2022-10-05

## 2022-10-05 ENCOUNTER — APPOINTMENT (OUTPATIENT)
Dept: HEMATOLOGY ONCOLOGY | Facility: CLINIC | Age: 32
End: 2022-10-05

## 2022-10-05 ENCOUNTER — RESULT REVIEW (OUTPATIENT)
Age: 32
End: 2022-10-05

## 2022-10-05 ENCOUNTER — LABORATORY RESULT (OUTPATIENT)
Age: 32
End: 2022-10-05

## 2022-10-05 ENCOUNTER — OUTPATIENT (OUTPATIENT)
Dept: OUTPATIENT SERVICES | Facility: HOSPITAL | Age: 32
LOS: 1 days | End: 2022-10-05
Payer: COMMERCIAL

## 2022-10-05 ENCOUNTER — NON-APPOINTMENT (OUTPATIENT)
Age: 32
End: 2022-10-05

## 2022-10-05 VITALS
HEIGHT: 62.99 IN | HEART RATE: 91 BPM | OXYGEN SATURATION: 99 % | RESPIRATION RATE: 16 BRPM | TEMPERATURE: 98.1 F | DIASTOLIC BLOOD PRESSURE: 66 MMHG | WEIGHT: 148.59 LBS | SYSTOLIC BLOOD PRESSURE: 100 MMHG | BODY MASS INDEX: 26.33 KG/M2

## 2022-10-05 DIAGNOSIS — D64.9 ANEMIA, UNSPECIFIED: ICD-10-CM

## 2022-10-05 DIAGNOSIS — Z78.9 OTHER SPECIFIED HEALTH STATUS: ICD-10-CM

## 2022-10-05 DIAGNOSIS — Z80.7 FAMILY HISTORY OF OTHER MALIGNANT NEOPLASMS OF LYMPHOID, HEMATOPOIETIC AND RELATED TISSUES: ICD-10-CM

## 2022-10-05 DIAGNOSIS — M32.14 GLOMERULAR DISEASE IN SYSTEMIC LUPUS ERYTHEMATOSUS: ICD-10-CM

## 2022-10-05 DIAGNOSIS — I10 ESSENTIAL (PRIMARY) HYPERTENSION: ICD-10-CM

## 2022-10-05 DIAGNOSIS — M32.9 SYSTEMIC LUPUS ERYTHEMATOSUS, UNSPECIFIED: ICD-10-CM

## 2022-10-05 LAB
BASOPHILS # BLD AUTO: 0.02 K/UL — SIGNIFICANT CHANGE UP (ref 0–0.2)
BASOPHILS NFR BLD AUTO: 0.3 % — SIGNIFICANT CHANGE UP (ref 0–2)
EOSINOPHIL # BLD AUTO: 0.09 K/UL — SIGNIFICANT CHANGE UP (ref 0–0.5)
EOSINOPHIL NFR BLD AUTO: 1.4 % — SIGNIFICANT CHANGE UP (ref 0–6)
HCT VFR BLD CALC: 30.2 % — LOW (ref 34.5–45)
HGB BLD-MCNC: 10.5 G/DL — LOW (ref 11.5–15.5)
IMM GRANULOCYTES NFR BLD AUTO: 0.9 % — SIGNIFICANT CHANGE UP (ref 0–0.9)
LYMPHOCYTES # BLD AUTO: 0.82 K/UL — LOW (ref 1–3.3)
LYMPHOCYTES # BLD AUTO: 12.8 % — LOW (ref 13–44)
MCHC RBC-ENTMCNC: 34.2 PG — HIGH (ref 27–34)
MCHC RBC-ENTMCNC: 34.8 G/DL — SIGNIFICANT CHANGE UP (ref 32–36)
MCV RBC AUTO: 98.4 FL — SIGNIFICANT CHANGE UP (ref 80–100)
MONOCYTES # BLD AUTO: 0.3 K/UL — SIGNIFICANT CHANGE UP (ref 0–0.9)
MONOCYTES NFR BLD AUTO: 4.7 % — SIGNIFICANT CHANGE UP (ref 2–14)
NEUTROPHILS # BLD AUTO: 5.12 K/UL — SIGNIFICANT CHANGE UP (ref 1.8–7.4)
NEUTROPHILS NFR BLD AUTO: 79.9 % — HIGH (ref 43–77)
NRBC # BLD: 0 /100 WBCS — SIGNIFICANT CHANGE UP (ref 0–0)
PLATELET # BLD AUTO: 163 K/UL — SIGNIFICANT CHANGE UP (ref 150–400)
RBC # BLD: 3.07 M/UL — LOW (ref 3.8–5.2)
RBC # FLD: 14.3 % — SIGNIFICANT CHANGE UP (ref 10.3–14.5)
RETICS #: 110.5 K/UL — SIGNIFICANT CHANGE UP (ref 25–125)
RETICS/RBC NFR: 3.6 % — HIGH (ref 0.5–2.5)
WBC # BLD: 6.41 K/UL — SIGNIFICANT CHANGE UP (ref 3.8–10.5)
WBC # FLD AUTO: 6.41 K/UL — SIGNIFICANT CHANGE UP (ref 3.8–10.5)

## 2022-10-05 PROCEDURE — 86880 COOMBS TEST DIRECT: CPT

## 2022-10-05 PROCEDURE — 99204 OFFICE O/P NEW MOD 45 MIN: CPT

## 2022-10-05 PROCEDURE — 86850 RBC ANTIBODY SCREEN: CPT

## 2022-10-05 PROCEDURE — 86900 BLOOD TYPING SEROLOGIC ABO: CPT

## 2022-10-05 PROCEDURE — 86901 BLOOD TYPING SEROLOGIC RH(D): CPT

## 2022-10-08 NOTE — ASSESSMENT
[FreeTextEntry1] : 32 yo F. with h/o HTN, iron deficiency, SLE, lupus nephritis class 3 on renal biopsy 8/2020- currently on Imuran and Plaquenil. Patient presents for evaluation of anemia at 22 weeks gestation, BROOK 2/5/2023. The patient's hemoglobin dropped from 11 g/dL in July to 9.8 g/dL in August. Most recent labs showed macrocytic anemia with hemoglobin of 9.6 g/dL. Hemolysis screen showed low haptoglobin with normal LDH and low normal Ferritin, Carmen was previously negative. Vitamin B12 was on the low side back in August this could cause the macrocytosis and hemolysis with severe deficiencies. Will repeat the Vitamin B12, patient to start oral Vitamin B12 1000mcg daily. Will recheck hemolysis panel, review peripheral smear to r/o schistocytes. Repeat the Silica clotting time and DRVVT. Low back pain may be related to the SLE and/or the pregnancy, advised trial of maternity lumbar support band and to discuss with OB. RTC in 1 month.\par \par Addendum 10/8/22\par \par This is 31 year old woman with a history of iron deficiency anemia, SLE, lupus nephritis confirmed on renal biopsy, 8/2020, currently on Imuran and Plaquenil. Patient presents for the evaluation of an anemia at 22 weeks gestation. BROOK 2/5/23.  Hg 9.6g/dl.  B12 was on the lower end of normal, recommend B12 and continue folic acid.\par  Will run carmen test along with LDH haptoglobin rule out hemolysis.  \par \par Given history of SLE, will run hypercoagulable state panel as it pertains to APS, recheck DRVVT, Silica time.  \par Continue Slofe for iron deficiency anemia that was already detected.

## 2022-10-08 NOTE — HISTORY OF PRESENT ILLNESS
[de-identified] : Daron Mclaughlin is a 32 yo F. with h/o HTN, iron deficiency, SLE, lupus nephritis class 3 on renal biopsy 2020- currently on Imuran and Plaquenil. Patient presents for evaluation of anemia at 22 weeks gestation, BROOK 2023. The patient's hemoglobin dropped from 11 g/dL in July to 9.8 g/dL in August. Most recent labs from 22 shows hemoglobin of 9.6 g/dL, .4 fl, RDW 15.0%,  K/uL, normal WBC and differential, creatinine 1.43 mg/dL, Ferritin  23 ng/mL, iron sat 24%,  U/L, serum haptoglobin < 20 mg/dL and [id number] activity > 100%. Back in July Vitamin B12 was 413 pg/mL,  Folate was 20 ng/mL, Cristin negative. Patient also had a positive LA on silica clotting time in July, DRVVT was negative, anticardiolipin and beta 2 glycoprotein antibody testing was negative. She has no h/o VTE and no h/o miscarriage. This is her first pregnancy. \par \par She reports extreme tiredness, exertional SOB and mild headaches occurring 1-2 times a week. The headaches resolves with hydration and rest. Patient also endorse new lumbar spine pain which began 4 days ago. Pain is aggravated by standing, relieved with sitting. No pain or burning with urination, endorses frothy urine and some frequency since the pregnancy. She has no prior h/o joint pains/aches and low back pain. She has no unexplained fevers, chills, night sweats.  She is taking slow Fe every other day since prior to pregnancy, tolerates with mild constipation. Eats a vegetarian diet. \par \par PMH: HTN, iron deficiency,  SLE, lupus nephritis class 3 on renal biopsy 2020- currently on Imuran and Plaquenil\par PSH: Renal biopsy and tooth extraction\par Hospitalization: denies \par GYN: \par SH: Denies h/o smoking and alcohol use. she's a Pharmacist. . \par FMH: mother has h/o anemia- does not know what type. Father has h/o Hodgkins lymphoma\par Medications: see medication reconciliation\par Allergies: NKDA\par Healthcare Maintenance\par PCP: planning to see Dr. Gee soon to establish. \par GYN: up to date\par COVID: received the Moderna Covid-19 vaccines with booster

## 2022-10-08 NOTE — PHYSICAL EXAM
[Normal] : affect appropriate [de-identified] : Supple [de-identified] : no calf tenderness [de-identified] : Soft nontender, no hepatosplenomegaly [de-identified] : + lumbar spine tenderness

## 2022-10-08 NOTE — REVIEW OF SYSTEMS
[Fatigue] : fatigue [SOB on Exertion] : shortness of breath during exertion [Negative] : Heme/Lymph [Fever] : no fever [Chills] : no chills [Night Sweats] : no night sweats [FreeTextEntry9] : low back pain

## 2022-10-13 ENCOUNTER — NON-APPOINTMENT (OUTPATIENT)
Age: 32
End: 2022-10-13

## 2022-10-13 LAB
ALBUMIN SERPL ELPH-MCNC: 3.7 G/DL
ALP BLD-CCNC: 62 U/L
ALT SERPL-CCNC: 10 U/L
ANION GAP SERPL CALC-SCNC: 13 MMOL/L
AST SERPL-CCNC: 16 U/L
BILIRUB DIRECT SERPL-MCNC: 0.1 MG/DL
BILIRUB INDIRECT SERPL-MCNC: 0.1 MG/DL
BILIRUB SERPL-MCNC: 0.2 MG/DL
BUN SERPL-MCNC: 25 MG/DL
CALCIUM SERPL-MCNC: 9.4 MG/DL
CHLORIDE SERPL-SCNC: 102 MMOL/L
CO2 SERPL-SCNC: 20 MMOL/L
CONFIRM: 29.5 SEC
CREAT SERPL-MCNC: 1.4 MG/DL
DRVVT IMM 1:2 NP PPP: NORMAL
DRVVT SCREEN TO CONFIRM RATIO: 1.08 RATIO
EGFR: 52 ML/MIN/1.73M2
FERRITIN SERPL-MCNC: 24 NG/ML
FOLATE SERPL-MCNC: >20 NG/ML
GLUCOSE SERPL-MCNC: 86 MG/DL
HAPTOGLOB SERPL-MCNC: <20 MG/DL
LDH SERPL-CCNC: 250 U/L
POTASSIUM SERPL-SCNC: 4.5 MMOL/L
PROT SERPL-MCNC: 6.7 G/DL
SCREEN DRVVT: 37.9 SEC
SILICA CLOTTING TIME INTERPRETATION: NORMAL
SILICA CLOTTING TIME S/C: 1.09 RATIO
SODIUM SERPL-SCNC: 135 MMOL/L
VIT B12 SERPL-MCNC: 414 PG/ML

## 2022-10-14 ENCOUNTER — NON-APPOINTMENT (OUTPATIENT)
Age: 32
End: 2022-10-14

## 2022-10-17 ENCOUNTER — APPOINTMENT (OUTPATIENT)
Dept: RHEUMATOLOGY | Facility: CLINIC | Age: 32
End: 2022-10-17

## 2022-10-17 VITALS
HEIGHT: 62.99 IN | TEMPERATURE: 98.1 F | BODY MASS INDEX: 26.58 KG/M2 | RESPIRATION RATE: 16 BRPM | WEIGHT: 150 LBS | SYSTOLIC BLOOD PRESSURE: 118 MMHG | DIASTOLIC BLOOD PRESSURE: 81 MMHG | OXYGEN SATURATION: 98 % | HEART RATE: 89 BPM

## 2022-10-17 DIAGNOSIS — N28.9 DISORDER OF KIDNEY AND URETER, UNSPECIFIED: ICD-10-CM

## 2022-10-17 PROCEDURE — 99214 OFFICE O/P EST MOD 30 MIN: CPT

## 2022-10-18 ENCOUNTER — ASOB RESULT (OUTPATIENT)
Age: 32
End: 2022-10-18

## 2022-10-18 ENCOUNTER — NON-APPOINTMENT (OUTPATIENT)
Age: 32
End: 2022-10-18

## 2022-10-18 ENCOUNTER — APPOINTMENT (OUTPATIENT)
Dept: OBGYN | Facility: CLINIC | Age: 32
End: 2022-10-18

## 2022-10-18 VITALS
BODY MASS INDEX: 28.34 KG/M2 | DIASTOLIC BLOOD PRESSURE: 81 MMHG | HEART RATE: 96 BPM | HEIGHT: 62 IN | WEIGHT: 154 LBS | SYSTOLIC BLOOD PRESSURE: 120 MMHG

## 2022-10-18 PROCEDURE — 76816 OB US FOLLOW-UP PER FETUS: CPT

## 2022-10-18 PROCEDURE — 0502F SUBSEQUENT PRENATAL CARE: CPT

## 2022-10-19 ENCOUNTER — APPOINTMENT (OUTPATIENT)
Dept: NEPHROLOGY | Facility: CLINIC | Age: 32
End: 2022-10-19

## 2022-10-19 VITALS
HEART RATE: 81 BPM | OXYGEN SATURATION: 99 % | BODY MASS INDEX: 28.2 KG/M2 | SYSTOLIC BLOOD PRESSURE: 114 MMHG | HEIGHT: 62 IN | DIASTOLIC BLOOD PRESSURE: 77 MMHG | WEIGHT: 153.22 LBS | TEMPERATURE: 97.2 F

## 2022-10-19 PROCEDURE — 99215 OFFICE O/P EST HI 40 MIN: CPT

## 2022-10-21 ENCOUNTER — NON-APPOINTMENT (OUTPATIENT)
Age: 32
End: 2022-10-21

## 2022-10-21 LAB
ALBUMIN SERPL ELPH-MCNC: 3.7 G/DL
ALBUMIN SERPL ELPH-MCNC: 3.7 G/DL
ALP BLD-CCNC: 73 U/L
ALT SERPL-CCNC: 13 U/L
ANION GAP SERPL CALC-SCNC: 14 MMOL/L
APPEARANCE: CLEAR
AST SERPL-CCNC: 17 U/L
BACTERIA: NEGATIVE
BASOPHILS # BLD AUTO: 0.02 K/UL
BASOPHILS NFR BLD AUTO: 0.4 %
BILIRUB DIRECT SERPL-MCNC: 0 MG/DL
BILIRUB INDIRECT SERPL-MCNC: 0.2 MG/DL
BILIRUB SERPL-MCNC: 0.2 MG/DL
BILIRUBIN URINE: NEGATIVE
BLOOD URINE: ABNORMAL
BUN SERPL-MCNC: 17 MG/DL
C3 SERPL-MCNC: 130 MG/DL
C4 SERPL-MCNC: 29 MG/DL
CALCIUM SERPL-MCNC: 9.2 MG/DL
CHLORIDE SERPL-SCNC: 105 MMOL/L
CO2 SERPL-SCNC: 20 MMOL/L
COLOR: NORMAL
CREAT SERPL-MCNC: 1.42 MG/DL
CREAT SPEC-SCNC: 56 MG/DL
CREAT/PROT UR: 0.2 RATIO
DSDNA AB SER-ACNC: <12 IU/ML
EGFR: 51 ML/MIN/1.73M2
EOSINOPHIL # BLD AUTO: 0.1 K/UL
EOSINOPHIL NFR BLD AUTO: 1.8 %
FERRITIN SERPL-MCNC: 28 NG/ML
FOLATE SERPL-MCNC: >20 NG/ML
GLUCOSE QUALITATIVE U: NEGATIVE
GLUCOSE SERPL-MCNC: 97 MG/DL
HAPTOGLOB SERPL-MCNC: <20 MG/DL
HCT VFR BLD CALC: 30.9 %
HGB BLD-MCNC: 10.2 G/DL
HYALINE CASTS: 1 /LPF
IMM GRANULOCYTES NFR BLD AUTO: 0.9 %
IRON SATN MFR SERPL: 36 %
IRON SERPL-MCNC: 148 UG/DL
KETONES URINE: NEGATIVE
LDH SERPL-CCNC: 249 U/L
LEUKOCYTE ESTERASE URINE: NEGATIVE
LYMPHOCYTES # BLD AUTO: 0.75 K/UL
LYMPHOCYTES NFR BLD AUTO: 13.7 %
MAGNESIUM SERPL-MCNC: 1.8 MG/DL
MAN DIFF?: NORMAL
MCHC RBC-ENTMCNC: 33 GM/DL
MCHC RBC-ENTMCNC: 34.2 PG
MCV RBC AUTO: 103.7 FL
MICROSCOPIC-UA: NORMAL
MONOCYTES # BLD AUTO: 0.26 K/UL
MONOCYTES NFR BLD AUTO: 4.8 %
NEUTROPHILS # BLD AUTO: 4.29 K/UL
NEUTROPHILS NFR BLD AUTO: 78.4 %
NITRITE URINE: NEGATIVE
PH URINE: 6.5
PHOSPHATE SERPL-MCNC: 3.9 MG/DL
PLATELET # BLD AUTO: 165 K/UL
POTASSIUM SERPL-SCNC: 4.4 MMOL/L
PROT SERPL-MCNC: 6.4 G/DL
PROT UR-MCNC: 12 MG/DL
PROTEIN URINE: NORMAL
RBC # BLD: 2.98 M/UL
RBC # FLD: 15.2 %
RED BLOOD CELLS URINE: 2 /HPF
SODIUM SERPL-SCNC: 139 MMOL/L
SPECIFIC GRAVITY URINE: 1.01
SQUAMOUS EPITHELIAL CELLS: 1 /HPF
TIBC SERPL-MCNC: 414 UG/DL
UIBC SERPL-MCNC: 266 UG/DL
URATE SERPL-MCNC: 8.2 MG/DL
UROBILINOGEN URINE: NORMAL
VIT B12 SERPL-MCNC: 972 PG/ML
WBC # FLD AUTO: 5.47 K/UL
WHITE BLOOD CELLS URINE: 1 /HPF

## 2022-10-21 NOTE — ASSESSMENT
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; global gs 56% ; focal seg 22%; IFTA mod-severe 50%; mild AS; 15/27 gs glomeruli; full house EM\par -Creatinine hx per pt: \par 2019 cr 1.7-1.9\par 2020 cr 2.3 at time of biopsy\par cr downtrended with treatment 1.9 Dec 2020\par cr 1.5 2021 (3 tabs aza)\par cr 1.6-1.7 2022 when aza decreased and approx for one year it has been stable \par -cr July 2022 1.4 \par -cr Aug 2022 1.3\par -cr sept 1.43\par -cr oct 1.40\par -cr has thus far been stable during pregnancy\par -cont plaquenil 200 qD, Imuran 100mg/day\par -check renal panel today\par \par #Non nephrotic range proteinuria- \par june 2020 prot/cr 1.5g\par dec 29 2020 prot.cr 0.1\par june 2022 0.1\par July 0.3\par July 0.2\par August 0.7 \par August 0.2\par Sept 0.7 \par oct 0.2\par repeat ua, prot/cr today for trend\par -Proteinuria currently stable during pregnancy \par -check trend today\par - Pt has had negative lupus serologies in the past & lupus serologies currently -ve \par -check c3,c4,dsdna today\par -pla2r,anca, c3,c4 nl in past; cecilia 1:160 in past\par \par #HTN- \par BP stable today, however pt reports since 22 weeks uptrending avg BP- was 110/70s now 120/80s for the past two weeks\par on Labetolol 100mg daily and Amlodipine 10mg daily which she takes in evening\par she will monitor BP as she is now; if she notices any elevated BP she will check it more frequently and inform us if elevated\par \par  #Anemia-\par -Drop in Hg from 11 in July to 9.8 in Aug with hapto < 20 (normal bili & LDH). \par -?Hx of hemolytic anemia pre-pregnancy likely from lupus. \par -follows with Dr. Troy Gomez. b12 supplementation\par - ADAMTS 13 and  APLs labs stable\par -Continue PO iron bid\par -Check iron studies, CBC, hemolysis labs today\par \par #high risk pregnancy- no sx; BP stable\par -she is on Asa for PEC prophylaxis\par -serology sent at dr forrest office July 2022- mild phosphotidylserine + and + silica clotting but neg DRVVT\par -monitor PEC labs

## 2022-10-21 NOTE — HISTORY OF PRESENT ILLNESS
[FreeTextEntry1] : Here for follow up\par 24 weeks now\par Reports /80sx 2 weeks (sicne approx 22 weeks gestation)\par 110s/70s prior\par Feels tired; stated she got covid booster two days prior\par Saw dr chan yesterday; Has appnt with Dr Mejia Monday\par Denies leg swelling, joint pains, rash \par Patient denies any blurred vision, double vision, headaches, hand swelling, leg swelling, or RUQ pain\par Patient denies any burning or pain with urination.\par Other ROS neg\par \par  \par \par

## 2022-11-02 ENCOUNTER — NON-APPOINTMENT (OUTPATIENT)
Age: 32
End: 2022-11-02

## 2022-11-02 ENCOUNTER — APPOINTMENT (OUTPATIENT)
Dept: OBGYN | Facility: CLINIC | Age: 32
End: 2022-11-02

## 2022-11-02 VITALS
HEIGHT: 62 IN | DIASTOLIC BLOOD PRESSURE: 68 MMHG | WEIGHT: 150 LBS | BODY MASS INDEX: 27.6 KG/M2 | SYSTOLIC BLOOD PRESSURE: 112 MMHG

## 2022-11-02 DIAGNOSIS — Z11.4 ENCOUNTER FOR SCREENING FOR HUMAN IMMUNODEFICIENCY VIRUS [HIV]: ICD-10-CM

## 2022-11-02 DIAGNOSIS — E55.9 VITAMIN D DEFICIENCY, UNSPECIFIED: ICD-10-CM

## 2022-11-02 PROCEDURE — 0502F SUBSEQUENT PRENATAL CARE: CPT

## 2022-11-02 PROCEDURE — 36415 COLL VENOUS BLD VENIPUNCTURE: CPT

## 2022-11-03 LAB
25(OH)D3 SERPL-MCNC: 46.6 NG/ML
ALBUMIN SERPL ELPH-MCNC: 3.4 G/DL
ALT SERPL-CCNC: 12 U/L
ANION GAP SERPL CALC-SCNC: 14 MMOL/L
AST SERPL-CCNC: 16 U/L
BASOPHILS # BLD AUTO: 0.02 K/UL
BASOPHILS NFR BLD AUTO: 0.4 %
BLD GP AB SCN SERPL QL: NORMAL
BUN SERPL-MCNC: 20 MG/DL
CALCIUM SERPL-MCNC: 9 MG/DL
CHLORIDE SERPL-SCNC: 104 MMOL/L
CO2 SERPL-SCNC: 20 MMOL/L
CREAT SERPL-MCNC: 1.49 MG/DL
EGFR: 48 ML/MIN/1.73M2
EOSINOPHIL # BLD AUTO: 0.1 K/UL
EOSINOPHIL NFR BLD AUTO: 1.9 %
GLUCOSE SERPL-MCNC: 131 MG/DL
HCT VFR BLD CALC: 29.6 %
HGB BLD-MCNC: 9.6 G/DL
HIV1+2 AB SPEC QL IA.RAPID: NONREACTIVE
IMM GRANULOCYTES NFR BLD AUTO: 0.9 %
LYMPHOCYTES # BLD AUTO: 1.02 K/UL
LYMPHOCYTES NFR BLD AUTO: 19.3 %
MAN DIFF?: NORMAL
MCHC RBC-ENTMCNC: 32.4 GM/DL
MCHC RBC-ENTMCNC: 33.6 PG
MCV RBC AUTO: 103.5 FL
MONOCYTES # BLD AUTO: 0.22 K/UL
MONOCYTES NFR BLD AUTO: 4.2 %
NEUTROPHILS # BLD AUTO: 3.88 K/UL
NEUTROPHILS NFR BLD AUTO: 73.3 %
PHOSPHATE SERPL-MCNC: 3.3 MG/DL
PLATELET # BLD AUTO: 181 K/UL
POTASSIUM SERPL-SCNC: 4 MMOL/L
RBC # BLD: 2.86 M/UL
RBC # FLD: 14.7 %
SODIUM SERPL-SCNC: 139 MMOL/L
T PALLIDUM AB SER QL IA: NEGATIVE
WBC # FLD AUTO: 5.29 K/UL

## 2022-11-04 ENCOUNTER — NON-APPOINTMENT (OUTPATIENT)
Age: 32
End: 2022-11-04

## 2022-11-04 ENCOUNTER — APPOINTMENT (OUTPATIENT)
Dept: INTERNAL MEDICINE | Facility: CLINIC | Age: 32
End: 2022-11-04

## 2022-11-04 VITALS
OXYGEN SATURATION: 99 % | DIASTOLIC BLOOD PRESSURE: 78 MMHG | SYSTOLIC BLOOD PRESSURE: 120 MMHG | HEART RATE: 96 BPM | BODY MASS INDEX: 27.97 KG/M2 | HEIGHT: 62 IN | WEIGHT: 152 LBS

## 2022-11-04 DIAGNOSIS — D64.9 ANEMIA, UNSPECIFIED: ICD-10-CM

## 2022-11-04 PROCEDURE — G0444 DEPRESSION SCREEN ANNUAL: CPT | Mod: 59

## 2022-11-04 PROCEDURE — 99385 PREV VISIT NEW AGE 18-39: CPT

## 2022-11-04 RX ORDER — ENOXAPARIN SODIUM 40 MG/.4ML
40 INJECTION, SOLUTION SUBCUTANEOUS
Qty: 12 | Refills: 0 | Status: COMPLETED | COMMUNITY
Start: 2022-06-16

## 2022-11-06 PROBLEM — D64.9 ANEMIA, UNSPECIFIED TYPE: Status: ACTIVE | Noted: 2022-10-05

## 2022-11-06 NOTE — PHYSICAL EXAM
[No Acute Distress] : no acute distress [Well-Appearing] : well-appearing [Normal Sclera/Conjunctiva] : normal sclera/conjunctiva [PERRL] : pupils equal round and reactive to light [EOMI] : extraocular movements intact [No Lymphadenopathy] : no lymphadenopathy [No Respiratory Distress] : no respiratory distress  [Clear to Auscultation] : lungs were clear to auscultation bilaterally [Normal] : normal rate, regular rhythm, normal S1 and S2 and no murmur heard [Pedal Pulses Present] : the pedal pulses are present [No Edema] : there was no peripheral edema [Normal Appearance] : normal in appearance [No Masses] : no palpable masses [No Axillary Lymphadenopathy] : no axillary lymphadenopathy [Soft] : abdomen soft [Non Tender] : non-tender [Normal Bowel Sounds] : normal bowel sounds [Normal Axillary Nodes] : no axillary lymphadenopathy [Normal Anterior Cervical Nodes] : no anterior cervical lymphadenopathy [No CVA Tenderness] : no CVA  tenderness [No Spinal Tenderness] : no spinal tenderness [No Joint Swelling] : no joint swelling [Grossly Normal Strength/Tone] : grossly normal strength/tone [No Rash] : no rash [Coordination Grossly Intact] : coordination grossly intact [Deep Tendon Reflexes (DTR)] : deep tendon reflexes were 2+ and symmetric [Normal Affect] : the affect was normal

## 2022-11-06 NOTE — HISTORY OF PRESENT ILLNESS
[FreeTextEntry1] : New patient here to [facility]  [de-identified] : 32 yo F with h/o SLE/lupus nephritis class 3, HTN, currently ~ 26 wks pregnant, recently seen by Heme for anemia \par \par RE preg: being managed by Bertha Frey (maternal fetal medicine). \par RE anemia: seen by PAULA Montez NP/ Troy Gomez MD - w/u in  progress\par RE SLE: presented in 2020 - followed by João Mejia, on Plaquenil and azathioprine. \par RE LN: bx c/w class 3, followed by Sarah Wilson, Cr 2.3 prior to tx, most recent 1.4\par RE HTN: followed by Robyn Ashraf NP - on amlodipine and labetalol. most recent bp 112/68\par \par Now that she is getting closer to delivery date, has questions about labor, delivery, nursing. \par

## 2022-11-06 NOTE — HEALTH RISK ASSESSMENT
[Never] : Never [No falls in past year] : Patient reported no falls in the past year [0] : 2) Feeling down, depressed, or hopeless: Not at all (0) [No Retinopathy] : No retinopathy [Patient reported PAP Smear was normal] : Patient reported PAP Smear was normal [HIV test declined] : HIV test declined [Hepatitis C test declined] : Hepatitis C test declined [None] : None [With Family] : lives with family [] :  [Sexually Active] : sexually active [Feels Safe at Home] : Feels safe at home [Fully functional (bathing, dressing, toileting, transferring, walking, feeding)] : Fully functional (bathing, dressing, toileting, transferring, walking, feeding) [Fully functional (using the telephone, shopping, preparing meals, housekeeping, doing laundry, using] : Fully functional and needs no help or supervision to perform IADLs (using the telephone, shopping, preparing meals, housekeeping, doing laundry, using transportation, managing medications and managing finances) [Smoke Detector] : smoke detector [Carbon Monoxide Detector] : carbon monoxide detector [Seat Belt] :  uses seat belt [With Patient/Caregiver] : , with patient/caregiver [Name: ___] : Health Care Proxy's Name: [unfilled]  [Never (0 pts)] : Never (0 points) [de-identified] : pre-preg - rare - now during preg - no ETOH at all [Audit-CScore] : 0 [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  [de-identified] : vegetarian - for protein - nut , cheese , beans, no fish, rare eggs [[id number]] : 0 [EyeExamDate] : 04/21 [Change in mental status noted] : No change in mental status noted [Reports changes in hearing] : Reports no changes in hearing [Reports changes in vision] : Reports no changes in vision [Guns at Home] : no guns at home [MammogramComments] : na [PapSmearDate] : 02/22 [BoneDensityComments] : na [ColonoscopyComments] : na - no fam hx [FreeTextEntry3] : first preg [AdvancecareDate] : 10/22

## 2022-11-06 NOTE — ASSESSMENT
[FreeTextEntry1] : 32 yo F with h/o SLE/lupus nephritis class 3, HTN, currently ~ 26 wks pregnant, recently seen by Heme for anemia \par \par RE preg: being managed by Bertha Frey (maternal fetal medicine). \par RE anemia: seen by PAULA Montez NP/ Troy Gomez MD - w/u in  progress\par RE SLE: presented in 2020 - followed by João Mejia, on Plaquenil and azathioprine. Overdue for ophtho - referral provided. \par RE LN: bx c/w class 3, followed by Sarah Wilson, Cr 2.3 prior to tx, most recent 1.4\par RE HTN: followed by Robyn Ashraf NP - on amlodipine and labetalol. most recent bp 112/68\par \par Now that she is getting closer to delivery date, has questions about labor, delivery, nursing - discussed at length. She needs resources - Will research on her behalf - review at next visit.  \par EKG unremarkable\par \par Annual alcohol screen completed this visit.Currently pregnant - not drinking any ETOH.  Healthy drinking guidelines shared with patient (Male no more than 4 SSD on any day, no more than 14 SSD per week; Female and male overage 65 no more than 3 SSD on any day, no more than 7 per week). Positive reinforcement provided given patient currently within healthy guidelines. \par \par Annual depression screen completed this visit and reviewed.  Screening not suggestive of diagnosis of MDD. \par \par

## 2022-11-08 DIAGNOSIS — Z13.1 ENCOUNTER FOR SCREENING FOR DIABETES MELLITUS: ICD-10-CM

## 2022-11-08 LAB — GLUCOSE 1H P 50 G GLC PO SERPL-MCNC: 155 MG/DL

## 2022-11-09 ENCOUNTER — NON-APPOINTMENT (OUTPATIENT)
Age: 32
End: 2022-11-09

## 2022-11-09 ENCOUNTER — APPOINTMENT (OUTPATIENT)
Dept: OBGYN | Facility: CLINIC | Age: 32
End: 2022-11-09

## 2022-11-09 ENCOUNTER — APPOINTMENT (OUTPATIENT)
Dept: NEPHROLOGY | Facility: CLINIC | Age: 32
End: 2022-11-09

## 2022-11-09 ENCOUNTER — LABORATORY RESULT (OUTPATIENT)
Age: 32
End: 2022-11-09

## 2022-11-09 VITALS
OXYGEN SATURATION: 98 % | SYSTOLIC BLOOD PRESSURE: 114 MMHG | WEIGHT: 157 LBS | HEIGHT: 62 IN | DIASTOLIC BLOOD PRESSURE: 71 MMHG | HEART RATE: 81 BPM | TEMPERATURE: 97.2 F | BODY MASS INDEX: 28.89 KG/M2

## 2022-11-09 DIAGNOSIS — R73.09 OTHER ABNORMAL GLUCOSE: ICD-10-CM

## 2022-11-09 PROCEDURE — 99214 OFFICE O/P EST MOD 30 MIN: CPT

## 2022-11-09 PROCEDURE — 59025 FETAL NON-STRESS TEST: CPT

## 2022-11-09 PROCEDURE — 0502F SUBSEQUENT PRENATAL CARE: CPT

## 2022-11-09 NOTE — HISTORY OF PRESENT ILLNESS
[FreeTextEntry1] : Here for follow up\par 27 weeks gestation\par Feeling tired now more often\par \par Patient denies any blurred vision, double vision, headaches, hand swelling, leg swelling, or RUQ pain\par Patient denies any burning or pain with urination.\par Reports that she will do a 3hr GTT bc one hr was 150 to r/o gestational DM  \par +Reflux which is increasing for which she takes famotidine 1-2x/week\par Denies vomiting, only regurgitation\par Reports less fetal movements today\par Other ROS neg  \par BP at home 120/80s on Amlodipine and Labetolol in evenings\par \par

## 2022-11-09 NOTE — ASSESSMENT
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; global gs 56% ; focal seg 22%; IFTA mod-severe 50%; mild AS; 15/27 gs glomeruli; full house EM\par -Creatinine hx per pt: \par 2019 cr 1.7-1.9\par 2020 cr 2.3 at time of biopsy\par cr downtrended with treatment 1.9 Dec 2020\par cr 1.5 2021 (3 tabs aza)\par cr 1.6-1.7 2022 when aza decreased and approx for one year it has been stable \par -cr July 2022 1.4 \par -cr Aug 2022 1.3\par -cr sept 1.43\par -cr oct 1.40\par -cr novt with OB 1.49\par -cr has thus far been stable during pregnancy/?mild uptrend\par -cont plaquenil 200 qD, Imuran 100mg/day\par -check renal panel today\par \par #Non nephrotic range proteinuria- \par june 2020 prot/cr 1.5g\par dec 29 2020 prot.cr 0.1\par june 2022 0.1\par July 0.3\par July 0.2\par August 0.7 \par August 0.2\par Sept 0.7 \par oct 0.2\par repeat ua, prot/cr today for trend\par -Proteinuria currently stable during pregnancy \par -check trend today\par - Pt has had negative lupus serologies in the past & lupus serologies currently -ve in Oct\par  -pla2r,anca, c3,c4 nl in past; cecilia 1:160 in past\par \par #HTN- \par BP stable today, however pt reports since 22 weeks uptrending avg BP- was 110/70s now 120/80s since last visit and stable\par on Labetalol 100mg daily and Amlodipine 10mg daily which she takes in evening\par she will monitor BP as she is now; if she notices any elevated BP she will check it more frequently and inform us if elevated\par \par  #Anemia-\par -Drop in Hg from 11 in July to 9.8 in Aug with hapto < 20 (normal bili & LDH). \par -?Hx of hemolytic anemia pre-pregnancy likely from lupus. \par -follows with Dr. Troy Gomez. b12 supplementation\par - ADAMTS 13 and APLs labs stable\par -Continue PO iron bid; she is on b12 5000 tiw\par -Check iron studies, CBC, hemolysis labs today\par \par #high risk pregnancy- no sx; BP stable\par -she is on Asa for PEC prophylaxis\par -serology sent at dr forrest office July 2022- mild phosphotidylserine + and + silica clotting but neg DRVVT\par -monitor PEC labs. \par \par #decreased fetal mvmts today- advised to call OB after this visit\par  \par

## 2022-11-09 NOTE — PHYSICAL EXAM
[General Appearance - Alert] : alert [General Appearance - In No Acute Distress] : in no acute distress [General Appearance - Well Nourished] : well nourished [General Appearance - Well Developed] : well developed [Sclera] : the sclera and conjunctiva were normal [Outer Ear] : the ears and nose were normal in appearance [Neck Appearance] : the appearance of the neck was normal [Respiration, Rhythm And Depth] : normal respiratory rhythm and effort [Auscultation Breath Sounds / Voice Sounds] : lungs were clear to auscultation bilaterally [Apical Impulse] : the apical impulse was normal [Heart Rate And Rhythm] : heart rate was normal and rhythm regular [Heart Sounds] : normal S1 and S2 [Edema] : there was no peripheral edema [Bowel Sounds] : normal bowel sounds [Abdomen Soft] : soft [Abdomen Tenderness] : non-tender [No CVA Tenderness] : no ~M costovertebral angle tenderness [Abnormal Walk] : normal gait [Skin Color & Pigmentation] : normal skin color and pigmentation [] : no rash [Skin Turgor] : normal skin turgor [No Focal Deficits] : no focal deficits [Impaired Insight] : insight and judgment were intact [Oriented To Time, Place, And Person] : oriented to person, place, and time [Affect] : the affect was normal

## 2022-11-09 NOTE — PHYSICAL EXAM
[General Appearance - Alert] : alert [General Appearance - In No Acute Distress] : in no acute distress [General Appearance - Well Nourished] : well nourished [General Appearance - Well Developed] : well developed [Sclera] : the sclera and conjunctiva were normal [Outer Ear] : the ears and nose were normal in appearance [Neck Appearance] : the appearance of the neck was normal [Respiration, Rhythm And Depth] : normal respiratory rhythm and effort [Auscultation Breath Sounds / Voice Sounds] : lungs were clear to auscultation bilaterally [Apical Impulse] : the apical impulse was normal [Heart Rate And Rhythm] : heart rate was normal and rhythm regular [Heart Sounds] : normal S1 and S2 [Edema] : there was no peripheral edema [Bowel Sounds] : normal bowel sounds [Abdomen Soft] : soft [Abdomen Tenderness] : non-tender [No CVA Tenderness] : no ~M costovertebral angle tenderness [Abnormal Walk] : normal gait [Skin Color & Pigmentation] : normal skin color and pigmentation [] : no rash [Skin Turgor] : normal skin turgor [No Focal Deficits] : no focal deficits [Oriented To Time, Place, And Person] : oriented to person, place, and time [Impaired Insight] : insight and judgment were intact [Affect] : the affect was normal

## 2022-11-10 ENCOUNTER — NON-APPOINTMENT (OUTPATIENT)
Age: 32
End: 2022-11-10

## 2022-11-10 LAB
ALBUMIN SERPL ELPH-MCNC: 3.3 G/DL
ALBUMIN SERPL ELPH-MCNC: 3.3 G/DL
ALP BLD-CCNC: 86 U/L
ALT SERPL-CCNC: 9 U/L
ANION GAP SERPL CALC-SCNC: 12 MMOL/L
APPEARANCE: CLEAR
AST SERPL-CCNC: 18 U/L
BACTERIA: NEGATIVE
BASOPHILS # BLD AUTO: 0.03 K/UL
BASOPHILS NFR BLD AUTO: 0.4 %
BILIRUB DIRECT SERPL-MCNC: 0 MG/DL
BILIRUB INDIRECT SERPL-MCNC: 0.1 MG/DL
BILIRUB SERPL-MCNC: 0.2 MG/DL
BILIRUBIN URINE: NEGATIVE
BLOOD URINE: NEGATIVE
BUN SERPL-MCNC: 20 MG/DL
CALCIUM SERPL-MCNC: 8.9 MG/DL
CHLORIDE SERPL-SCNC: 105 MMOL/L
CO2 SERPL-SCNC: 19 MMOL/L
COLOR: NORMAL
CREAT SERPL-MCNC: 1.43 MG/DL
CREAT SPEC-SCNC: 58 MG/DL
CREAT/PROT UR: 0.3 RATIO
EGFR: 51 ML/MIN/1.73M2
EOSINOPHIL # BLD AUTO: 0.12 K/UL
EOSINOPHIL NFR BLD AUTO: 1.8 %
FERRITIN SERPL-MCNC: 21 NG/ML
GLUCOSE QUALITATIVE U: NEGATIVE
GLUCOSE SERPL-MCNC: 75 MG/DL
HAPTOGLOB SERPL-MCNC: <20 MG/DL
HCT VFR BLD CALC: 28.9 %
HGB BLD-MCNC: 9.4 G/DL
HYALINE CASTS: 2 /LPF
IMM GRANULOCYTES NFR BLD AUTO: 1.2 %
IRON SATN MFR SERPL: 47 %
IRON SERPL-MCNC: 198 UG/DL
KETONES URINE: NEGATIVE
LDH SERPL-CCNC: 265 U/L
LEUKOCYTE ESTERASE URINE: NEGATIVE
LYMPHOCYTES # BLD AUTO: 1.02 K/UL
LYMPHOCYTES NFR BLD AUTO: 15 %
MAGNESIUM SERPL-MCNC: 1.8 MG/DL
MAN DIFF?: NORMAL
MCHC RBC-ENTMCNC: 32.5 GM/DL
MCHC RBC-ENTMCNC: 34.2 PG
MCV RBC AUTO: 105.1 FL
MICROSCOPIC-UA: NORMAL
MONOCYTES # BLD AUTO: 0.41 K/UL
MONOCYTES NFR BLD AUTO: 6 %
NEUTROPHILS # BLD AUTO: 5.16 K/UL
NEUTROPHILS NFR BLD AUTO: 75.6 %
NITRITE URINE: NEGATIVE
PH URINE: 6.5
PHOSPHATE SERPL-MCNC: 3.4 MG/DL
PLATELET # BLD AUTO: 158 K/UL
POTASSIUM SERPL-SCNC: 4.5 MMOL/L
PROT SERPL-MCNC: 6 G/DL
PROT UR-MCNC: 15 MG/DL
PROTEIN URINE: NORMAL
RBC # BLD: 2.75 M/UL
RBC # FLD: 15.4 %
RED BLOOD CELLS URINE: 1 /HPF
SODIUM SERPL-SCNC: 137 MMOL/L
SPECIFIC GRAVITY URINE: 1.01
SQUAMOUS EPITHELIAL CELLS: 1 /HPF
TIBC SERPL-MCNC: 418 UG/DL
UIBC SERPL-MCNC: 220 UG/DL
URATE SERPL-MCNC: 9.2 MG/DL
UROBILINOGEN URINE: NORMAL
WBC # FLD AUTO: 6.82 K/UL
WHITE BLOOD CELLS URINE: 1 /HPF

## 2022-11-14 ENCOUNTER — NON-APPOINTMENT (OUTPATIENT)
Age: 32
End: 2022-11-14

## 2022-11-14 ENCOUNTER — ASOB RESULT (OUTPATIENT)
Age: 32
End: 2022-11-14

## 2022-11-14 ENCOUNTER — APPOINTMENT (OUTPATIENT)
Dept: OBGYN | Facility: CLINIC | Age: 32
End: 2022-11-14

## 2022-11-14 VITALS
HEART RATE: 92 BPM | HEIGHT: 62 IN | WEIGHT: 157 LBS | BODY MASS INDEX: 28.89 KG/M2 | SYSTOLIC BLOOD PRESSURE: 117 MMHG | DIASTOLIC BLOOD PRESSURE: 72 MMHG

## 2022-11-14 PROCEDURE — 76816 OB US FOLLOW-UP PER FETUS: CPT

## 2022-11-14 PROCEDURE — 0502F SUBSEQUENT PRENATAL CARE: CPT

## 2022-11-17 ENCOUNTER — APPOINTMENT (OUTPATIENT)
Dept: HEMATOLOGY ONCOLOGY | Facility: CLINIC | Age: 32
End: 2022-11-17

## 2022-11-17 ENCOUNTER — APPOINTMENT (OUTPATIENT)
Dept: INTERNAL MEDICINE | Facility: CLINIC | Age: 32
End: 2022-11-17

## 2022-11-17 DIAGNOSIS — Z00.00 ENCOUNTER FOR GENERAL ADULT MEDICAL EXAMINATION W/OUT ABNORMAL FINDINGS: ICD-10-CM

## 2022-11-17 PROCEDURE — 99214 OFFICE O/P EST MOD 30 MIN: CPT | Mod: 95

## 2022-11-17 NOTE — HISTORY OF PRESENT ILLNESS
[Home] : at home, [unfilled] , at the time of the visit. [Medical Office: ([facility])___] : at the medical office located in  [Verbal consent obtained from patient] : the patient, [unfilled] [de-identified] : 32 yo F with h/o SLE/lupus nephritis class 3, HTN, currently ~ 26 wks pregnant, recently seen by Heme for anemia \par \par Chart reviewed today in preparation for visit. \par \par Doing well - will be 29 weeks this Sunday.  Baby growing well and her labs are all stable.  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 So she feels tired all the time. She is considering stopping work around the 30 week milestone. MFM now wants to start seeing her twice a week, sees renal and Rheum, she doesn’t think [employer] will be able to janna that level of time off anyway.  \par \par Is very interested  to review resources as we discussed last visit.\par \par Is interested in connection to  services. Before moving to [place], she had a long and very productive relationship with a therapist.  Would like to establish care here as well. \par \par \par Prior (11/4/22)\par RE preg: being managed by Bertha Frey (maternal fetal medicine). \par RE anemia: seen by PAULA Montez NP/ Troy Gomez MD - w/u in  progress\par RE SLE: presented in 2020 - followed by João Mejia, on Plaquenil and azathioprine. Overdue for ophtho - referral provided. \par RE LN: bx c/w class 3, followed by Sarah Wilson, Cr 2.3 prior to tx, most recent 1.4\par RE HTN: followed by Robyn Ashraf NP - on amlodipine and labetalol. most recent bp 112/68\par \par Now that she is getting closer to delivery date, has questions about labor, delivery, nursing - discussed at length. She needs resources - Will research on her behalf - review at next visit.  \par EKG unremarkable

## 2022-11-17 NOTE — ASSESSMENT
[FreeTextEntry1] : 32 yo F with h/o SLE/lupus nephritis class 3, HTN, currently ~ 26 wks pregnant, recently seen by Heme for anemia \par \par High risk pregnancy: Doing well - will be 29 weeks this Sunday.  Baby growing well and her labs are all stable.  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 Reports feeling tired all the time. She is considering stopping work around the 30 week milestone. [facility] now wants to start seeing her twice a week, also seeing renal, Rheum, Hematology, cardiology - she doesn’t think [employer] will be able to janna that level of time off anyway.  \par Discussed some potential strategies to manage fatigue.\par \par RE pregnancy and breastfeeding: reviewed multiple resources available to her to help her prepare. Will send links to the resources discussed through [facility].\par \par RE  support: Is interested in connection to  services. Before moving to [place], she had a long and very productive relationship with a therapist.  Would like to establish care here as well. \par Will reach out to our  liaison on her behalf - arrange outreach out to Ms Mclaughlin with options. \par \par Follow up postpartum - sooner if needed.

## 2022-11-21 ENCOUNTER — NON-APPOINTMENT (OUTPATIENT)
Age: 32
End: 2022-11-21

## 2022-11-21 ENCOUNTER — APPOINTMENT (OUTPATIENT)
Dept: NEPHROLOGY | Facility: CLINIC | Age: 32
End: 2022-11-21

## 2022-11-21 VITALS — RESPIRATION RATE: 14 BRPM | HEART RATE: 88 BPM

## 2022-11-21 VITALS — DIASTOLIC BLOOD PRESSURE: 84 MMHG | SYSTOLIC BLOOD PRESSURE: 112 MMHG

## 2022-11-21 PROCEDURE — 99214 OFFICE O/P EST MOD 30 MIN: CPT

## 2022-11-21 NOTE — ASSESSMENT
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; global gs 56% ; focal seg 22%; IFTA mod-severe 50%; mild AS; 15/27 gs glomeruli; full house EM\par -Creatinine hx per pt: \par 2019 cr 1.7-1.9\par 2020 cr 2.3 at time of biopsy\par cr downtrended with treatment 1.9 Dec 2020\par cr 1.5 2021 (3 tabs aza)\par cr 1.6-1.7 2022 when aza decreased and approx for one year it has been stable \par -cr July 2022 1.4 \par -cr Aug 2022 1.3\par -cr sept 1.43\par -cr oct 1.40\par -cr nov with OB 1.49\par -Nov 9th 1.43\par -cr has thus far been stable during pregnancy \par -cont plaquenil 200 qD, Imuran 100mg/day\par -check renal panel today\par \par #Non nephrotic range proteinuria- \par june 2020 prot/cr 1.5g\par dec 29 2020 prot.cr 0.1\par june 2022 0.1\par July 0.3\par July 0.2\par August 0.7 \par August 0.2\par Sept 0.7 \par oct 0.2\par nov 0.3 \par repeat ua, prot/cr today for trend\par -Proteinuria currently stable during pregnancy \par -check trend today\par #SLE-Pt has had negative lupus serologies in the past & lupus serologies currently -ve in Oct\par -check lupus serology today- c3,c4,dsdna\par \par #HTN- \par BP stable today, however pt reports since 22 weeks uptrending avg BP- was 110/70s now 120/80s and now higher 120s/80s; \par on Labetalol 100mg daily and Amlodipine 10mg daily which she takes in evening\par BP in office ok\par she will monitor BP as she is now; if she notices any elevated BP she will check it more frequently and inform us if elevated\par if elevated we can always add labetalol in daytime\par \par  #Anemia-\par -Drop in Hg from 11 in July to 9.8 in Aug with hapto < 20 (normal bili & LDH). \par -?Hx of hemolytic anemia pre-pregnancy likely from lupus. \par -follows with Dr. Troy Gomez. b12 supplementation\par - ADAMTS 13 and APLs labs stable\par -Continue PO iron bid; she is on b12  \par -Check iron studies, CBC, hemolysis labs today\par \par #high risk pregnancy- no sx; BP stable\par -she is on Asa for PEC prophylaxis\par -serology sent at dr forrest office July 2022- mild phosphotidylserine + and + silica clotting but neg DRVVT\par -monitor PEC labs. \par \par #doing GTT today\par . \par \par

## 2022-11-21 NOTE — PHYSICAL EXAM
[General Appearance - Alert] : alert [General Appearance - In No Acute Distress] : in no acute distress [General Appearance - Well Nourished] : well nourished [General Appearance - Well Developed] : well developed [Sclera] : the sclera and conjunctiva were normal [Outer Ear] : the ears and nose were normal in appearance [Neck Appearance] : the appearance of the neck was normal [Respiration, Rhythm And Depth] : normal respiratory rhythm and effort [Auscultation Breath Sounds / Voice Sounds] : lungs were clear to auscultation bilaterally [Apical Impulse] : the apical impulse was normal [Heart Rate And Rhythm] : heart rate was normal and rhythm regular [Heart Sounds] : normal S1 and S2 [FreeTextEntry1] : trace edema mid shin b/l [Bowel Sounds] : normal bowel sounds [Abdomen Soft] : soft [Abdomen Tenderness] : non-tender [No CVA Tenderness] : no ~M costovertebral angle tenderness [Abnormal Walk] : normal gait [Skin Color & Pigmentation] : normal skin color and pigmentation [Skin Turgor] : normal skin turgor [] : no rash [No Focal Deficits] : no focal deficits [Oriented To Time, Place, And Person] : oriented to person, place, and time [Impaired Insight] : insight and judgment were intact 184.9 [Affect] : the affect was normal

## 2022-11-21 NOTE — HISTORY OF PRESENT ILLNESS
[FreeTextEntry1] : 29 weeks gestation\par Currently doing her 3 hr GTT today \par Reports BP trend may be increasing:\par prior -120s/70-80s\par Now mid 124s/87s; Checks BP in mornings; Highest 127s\par Takes Amlodipine 10mg and Labetalol 100mg in evening\par \par Feels more tired\par slight frontal HA even today x on/off for the past one week, reports it as a persisting HA x 2 hrs, gets better with rest; Has had this HA prior to 2-3 weeks , then it went away, now back and more frequent; Reports it cold be related to being tired \par  \par Patient denies any blurred vision, double vision, hand swelling, leg swelling, or RUQ pain\par Patient denies any burning or pain with urination.\par +frothy urine, same\par +HA as above\par \par \par  \par

## 2022-11-22 ENCOUNTER — RESULT REVIEW (OUTPATIENT)
Age: 32
End: 2022-11-22

## 2022-11-22 ENCOUNTER — APPOINTMENT (OUTPATIENT)
Dept: HEMATOLOGY ONCOLOGY | Facility: CLINIC | Age: 32
End: 2022-11-22

## 2022-11-22 VITALS
OXYGEN SATURATION: 99 % | TEMPERATURE: 97.3 F | HEART RATE: 79 BPM | SYSTOLIC BLOOD PRESSURE: 104 MMHG | DIASTOLIC BLOOD PRESSURE: 76 MMHG | WEIGHT: 159.61 LBS | RESPIRATION RATE: 16 BRPM | HEIGHT: 62.01 IN | BODY MASS INDEX: 29 KG/M2

## 2022-11-22 LAB
BASOPHILS # BLD AUTO: 0.02 K/UL — SIGNIFICANT CHANGE UP (ref 0–0.2)
BASOPHILS NFR BLD AUTO: 0.4 % — SIGNIFICANT CHANGE UP (ref 0–2)
EOSINOPHIL # BLD AUTO: 0.09 K/UL — SIGNIFICANT CHANGE UP (ref 0–0.5)
EOSINOPHIL NFR BLD AUTO: 1.6 % — SIGNIFICANT CHANGE UP (ref 0–6)
HCT VFR BLD CALC: 28.1 % — LOW (ref 34.5–45)
HGB BLD-MCNC: 9.8 G/DL — LOW (ref 11.5–15.5)
IMM GRANULOCYTES NFR BLD AUTO: 1.6 % — HIGH (ref 0–0.9)
LYMPHOCYTES # BLD AUTO: 0.92 K/UL — LOW (ref 1–3.3)
LYMPHOCYTES # BLD AUTO: 16.2 % — SIGNIFICANT CHANGE UP (ref 13–44)
MCHC RBC-ENTMCNC: 33.9 PG — SIGNIFICANT CHANGE UP (ref 27–34)
MCHC RBC-ENTMCNC: 34.9 G/DL — SIGNIFICANT CHANGE UP (ref 32–36)
MCV RBC AUTO: 97.2 FL — SIGNIFICANT CHANGE UP (ref 80–100)
MONOCYTES # BLD AUTO: 0.33 K/UL — SIGNIFICANT CHANGE UP (ref 0–0.9)
MONOCYTES NFR BLD AUTO: 5.8 % — SIGNIFICANT CHANGE UP (ref 2–14)
NEUTROPHILS # BLD AUTO: 4.22 K/UL — SIGNIFICANT CHANGE UP (ref 1.8–7.4)
NEUTROPHILS NFR BLD AUTO: 74.4 % — SIGNIFICANT CHANGE UP (ref 43–77)
NRBC # BLD: 0 /100 WBCS — SIGNIFICANT CHANGE UP (ref 0–0)
PLATELET # BLD AUTO: 163 K/UL — SIGNIFICANT CHANGE UP (ref 150–400)
RBC # BLD: 2.89 M/UL — LOW (ref 3.8–5.2)
RBC # FLD: 14.2 % — SIGNIFICANT CHANGE UP (ref 10.3–14.5)
RETICS #: 113 K/UL — SIGNIFICANT CHANGE UP (ref 25–125)
RETICS/RBC NFR: 3.9 % — HIGH (ref 0.5–2.5)
WBC # BLD: 5.67 K/UL — SIGNIFICANT CHANGE UP (ref 3.8–10.5)
WBC # FLD AUTO: 5.67 K/UL — SIGNIFICANT CHANGE UP (ref 3.8–10.5)

## 2022-11-22 PROCEDURE — 99214 OFFICE O/P EST MOD 30 MIN: CPT

## 2022-11-23 ENCOUNTER — NON-APPOINTMENT (OUTPATIENT)
Age: 32
End: 2022-11-23

## 2022-11-23 ENCOUNTER — APPOINTMENT (OUTPATIENT)
Dept: OBGYN | Facility: CLINIC | Age: 32
End: 2022-11-23

## 2022-11-23 ENCOUNTER — ASOB RESULT (OUTPATIENT)
Age: 32
End: 2022-11-23

## 2022-11-23 PROCEDURE — 0502F SUBSEQUENT PRENATAL CARE: CPT

## 2022-11-23 PROCEDURE — 76819 FETAL BIOPHYS PROFIL W/O NST: CPT

## 2022-11-24 NOTE — ASSESSMENT
[FreeTextEntry1] : This is 31 year old woman with a history of iron deficiency anemia, SLE, lupus nephritis confirmed on renal biopsy, 8/2020, currently on Imuran and Plaquenil. Patient presents for the evaluation of an anemia at 22 weeks gestation. BROOK 2/5/23.  Hg 9.6g/dl.  B12 was on the lower end of normal, recommend B12 and continue folic acid.\par Hg 9.8g/dl ferritin remains low at 23.  Hg is improving slowly but steadily with PO iron supplementation. Continue B12 1000mcg daily along with ferrous gluconate 38mg twice daily for iron deffeicny anemia.  \par \par DRVVT negative X 2, Silica clotting time was positive once but negative the 2nd time on October 5th.  Patel snot make criteria for Antiphospholipid syndrome.  Can decrease aspirin to 81mg daily for prevention of pre-eclampsia, the higher dose of 162mg is not strictly necessary, as patient does not have APLS and does seem to be causing patient some amount of gastritis.

## 2022-11-24 NOTE — HISTORY OF PRESENT ILLNESS
[de-identified] : Daron Mclaughlin is a 30 yo F. with h/o HTN, iron deficiency, SLE, lupus nephritis class 3 on renal biopsy 2020- currently on Imuran and Plaquenil. Patient presents for evaluation of anemia at 22 weeks gestation, BROOK 2023. The patient's hemoglobin dropped from 11 g/dL in July to 9.8 g/dL in August. Most recent labs from 22 shows hemoglobin of 9.6 g/dL, .4 fl, RDW 15.0%,  K/uL, normal WBC and differential, creatinine 1.43 mg/dL, Ferritin 23 ng/mL, iron sat 24%,  U/L, serum haptoglobin < 20 mg/dL and [id number] activity > 100%. Back in July Vitamin B12 was 413 pg/mL, Folate was 20 ng/mL, Cristin negative. Patient also had a positive LA on silica clotting time in July, DRVVT was negative, anticardiolipin and beta 2 glycoprotein antibody testing was negative. She has no h/o VTE and no h/o miscarriage. This is her first pregnancy. \par \par She reports extreme tiredness, exertional SOB and mild headaches occurring 1-2 times a week. The headaches resolves with hydration and rest. Patient also endorse new lumbar spine pain which began 4 days ago. Pain is aggravated by standing, relieved with sitting. No pain or burning with urination, endorses frothy urine and some frequency since the pregnancy. She has no prior h/o joint pains/aches and low back pain. She has no unexplained fevers, chills, night sweats. She is taking slow Fe every other day since prior to pregnancy, tolerates with mild constipation. Eats a vegetarian diet. \par \par PMH: HTN, iron deficiency, SLE, lupus nephritis class 3 on renal biopsy 2020- currently on Imuran and Plaquenil\par PSH: Renal biopsy and tooth extraction\par Hospitalization: denies \par GYN: \par SH: Denies h/o smoking and alcohol use. she's a Pharmacist. . \par FMH: mother has h/o anemia- does not know what type. Father has h/o Hodgkins lymphoma\par Medications: see medication reconciliation\par Allergies: NKDA\par Healthcare Maintenance\par PCP: planning to see Dr. Gee soon to establish. \par GYN: up to date\par COVID: received the Moderna Covid-19 vaccines with booster. \par  [de-identified] : Patient now 29 weeks gestation Planning induction at 36 weeks.   which is mid January\par  On workup had a low iron storage, some obstipation.  More recent \par DRVVT negative twice, Patient is on aspirin 162mg daily for the positive silica time however the 2nd one was negative on October 5th.  \par \par B12 was back up to 972.

## 2022-11-28 ENCOUNTER — NON-APPOINTMENT (OUTPATIENT)
Age: 32
End: 2022-11-28

## 2022-11-29 LAB
ALBUMIN SERPL ELPH-MCNC: 3.5 G/DL
ALBUMIN SERPL ELPH-MCNC: 3.5 G/DL
ALP BLD-CCNC: 101 U/L
ALT SERPL-CCNC: 11 U/L
ANION GAP SERPL CALC-SCNC: 12 MMOL/L
APPEARANCE: CLEAR
AST SERPL-CCNC: 15 U/L
BACTERIA: NEGATIVE
BASOPHILS # BLD AUTO: 0.02 K/UL
BASOPHILS NFR BLD AUTO: 0.4 %
BILIRUB DIRECT SERPL-MCNC: 0.1 MG/DL
BILIRUB INDIRECT SERPL-MCNC: 0.2 MG/DL
BILIRUB SERPL-MCNC: 0.2 MG/DL
BILIRUBIN URINE: NEGATIVE
BLOOD URINE: NEGATIVE
BUN SERPL-MCNC: 18 MG/DL
C3 SERPL-MCNC: 148 MG/DL
C4 SERPL-MCNC: 31 MG/DL
CALCIUM SERPL-MCNC: 9 MG/DL
CHLORIDE SERPL-SCNC: 105 MMOL/L
CO2 SERPL-SCNC: 20 MMOL/L
COLOR: NORMAL
CREAT SERPL-MCNC: 1.52 MG/DL
CREAT SPEC-SCNC: 45 MG/DL
CREAT/PROT UR: 0.3 RATIO
DSDNA AB SER-ACNC: <12 IU/ML
EGFR: 47 ML/MIN/1.73M2
EOSINOPHIL # BLD AUTO: 0.09 K/UL
EOSINOPHIL NFR BLD AUTO: 1.7 %
FERRITIN SERPL-MCNC: 20 NG/ML
GLUCOSE QUALITATIVE U: NEGATIVE
GLUCOSE SERPL-MCNC: 134 MG/DL
HAPTOGLOB SERPL-MCNC: <20 MG/DL
HCT VFR BLD CALC: 28.6 %
HGB BLD-MCNC: 9.5 G/DL
HYALINE CASTS: 0 /LPF
IMM GRANULOCYTES NFR BLD AUTO: 1.7 %
IRON SATN MFR SERPL: 29 %
IRON SERPL-MCNC: 121 UG/DL
KETONES URINE: NEGATIVE
LDH SERPL-CCNC: 257 U/L
LEUKOCYTE ESTERASE URINE: NEGATIVE
LYMPHOCYTES # BLD AUTO: 0.81 K/UL
LYMPHOCYTES NFR BLD AUTO: 15 %
MAGNESIUM SERPL-MCNC: 1.8 MG/DL
MAN DIFF?: NORMAL
MCHC RBC-ENTMCNC: 33.2 GM/DL
MCHC RBC-ENTMCNC: 33.5 PG
MCV RBC AUTO: 100.7 FL
MICROSCOPIC-UA: NORMAL
MONOCYTES # BLD AUTO: 0.24 K/UL
MONOCYTES NFR BLD AUTO: 4.5 %
NEUTROPHILS # BLD AUTO: 4.14 K/UL
NEUTROPHILS NFR BLD AUTO: 76.7 %
NITRITE URINE: NEGATIVE
PH URINE: 6
PHOSPHATE SERPL-MCNC: 3.3 MG/DL
PLATELET # BLD AUTO: 158 K/UL
POTASSIUM SERPL-SCNC: 4.2 MMOL/L
PROT SERPL-MCNC: 6.1 G/DL
PROT UR-MCNC: 15 MG/DL
PROTEIN URINE: NORMAL
RBC # BLD: 2.84 M/UL
RBC # FLD: 14.8 %
RED BLOOD CELLS URINE: 2 /HPF
SODIUM SERPL-SCNC: 137 MMOL/L
SPECIFIC GRAVITY URINE: 1.01
SQUAMOUS EPITHELIAL CELLS: 1 /HPF
TIBC SERPL-MCNC: 421 UG/DL
UIBC SERPL-MCNC: 301 UG/DL
URATE SERPL-MCNC: 9.2 MG/DL
UROBILINOGEN URINE: NORMAL
WBC # FLD AUTO: 5.39 K/UL
WHITE BLOOD CELLS URINE: 2 /HPF

## 2022-11-30 ENCOUNTER — NON-APPOINTMENT (OUTPATIENT)
Age: 32
End: 2022-11-30

## 2022-11-30 ENCOUNTER — ASOB RESULT (OUTPATIENT)
Age: 32
End: 2022-11-30

## 2022-11-30 ENCOUNTER — APPOINTMENT (OUTPATIENT)
Dept: OBGYN | Facility: CLINIC | Age: 32
End: 2022-11-30

## 2022-11-30 DIAGNOSIS — B96.89 ACUTE VAGINITIS: ICD-10-CM

## 2022-11-30 DIAGNOSIS — N76.0 ACUTE VAGINITIS: ICD-10-CM

## 2022-11-30 PROCEDURE — 0502F SUBSEQUENT PRENATAL CARE: CPT

## 2022-11-30 PROCEDURE — 76819 FETAL BIOPHYS PROFIL W/O NST: CPT

## 2022-12-01 LAB
ALBUMIN SERPL ELPH-MCNC: 3.2 G/DL
ALP BLD-CCNC: 101 U/L
ALT SERPL-CCNC: 8 U/L
ANION GAP SERPL CALC-SCNC: 12 MMOL/L
AST SERPL-CCNC: 20 U/L
BILIRUB SERPL-MCNC: 0.2 MG/DL
BUN SERPL-MCNC: 21 MG/DL
CALCIUM SERPL-MCNC: 8.9 MG/DL
CANDIDA VAG CYTO: NOT DETECTED
CHLORIDE SERPL-SCNC: 103 MMOL/L
CO2 SERPL-SCNC: 19 MMOL/L
CREAT SERPL-MCNC: 1.46 MG/DL
EGFR: 50 ML/MIN/1.73M2
FERRITIN SERPL-MCNC: 23 NG/ML
FOLATE SERPL-MCNC: >20 NG/ML
G VAGINALIS+PREV SP MTYP VAG QL MICRO: NOT DETECTED
GLUCOSE SERPL-MCNC: 112 MG/DL
IRON SATN MFR SERPL: 26 %
IRON SERPL-MCNC: 116 UG/DL
POTASSIUM SERPL-SCNC: 4.5 MMOL/L
PROT SERPL-MCNC: 6 G/DL
SODIUM SERPL-SCNC: 134 MMOL/L
T VAGINALIS VAG QL WET PREP: NOT DETECTED
TIBC SERPL-MCNC: 445 UG/DL
UIBC SERPL-MCNC: 329 UG/DL
VIT B12 SERPL-MCNC: >2000 PG/ML

## 2022-12-05 ENCOUNTER — RX RENEWAL (OUTPATIENT)
Age: 32
End: 2022-12-05

## 2022-12-06 ENCOUNTER — APPOINTMENT (OUTPATIENT)
Dept: NEPHROLOGY | Facility: CLINIC | Age: 32
End: 2022-12-06

## 2022-12-06 ENCOUNTER — NON-APPOINTMENT (OUTPATIENT)
Age: 32
End: 2022-12-06

## 2022-12-06 VITALS
HEART RATE: 72 BPM | SYSTOLIC BLOOD PRESSURE: 110 MMHG | DIASTOLIC BLOOD PRESSURE: 76 MMHG | HEIGHT: 60 IN | BODY MASS INDEX: 31.81 KG/M2 | OXYGEN SATURATION: 98 % | WEIGHT: 162.04 LBS | TEMPERATURE: 97.2 F

## 2022-12-06 PROCEDURE — 99214 OFFICE O/P EST MOD 30 MIN: CPT

## 2022-12-06 NOTE — ASSESSMENT
[FreeTextEntry1] : 31 year old female with h/o lupus nephritis class III, with CKD, now 31 weeks gestation here for continued pregnancy follow up\par \par #lupus nephritis with CKD 3\par -Class III- Aug 2020 Renal biopsy: IC GN- diff mesangial and focal mp; global gs 56% ; focal seg 22%; IFTA mod-severe 50%; mild AS; 15/27 gs glomeruli; full house EM\par -Creatinine hx per pt: \par 2019 cr 1.7-1.9\par 2020 cr 2.3 at time of biopsy\par cr downtrended with treatment 1.9 Dec 2020\par cr 1.5 2021 (3 tabs aza)\par cr 1.6-1.7 2022 when aza decreased and approx for one year it has been stable \par -cr July 2022 1.4 \par -cr Aug 2022 1.3\par -cr sept 1.43\par -cr oct 1.40\par -cr nov with OB 1.49\par -Nov 9th 1.43\par -nov 22 1.46\par -cr has thus far been stable during pregnancy \par -cont plaquenil 200 qD, Imuran 100mg/day\par -check renal panel today\par \par #Non nephrotic range proteinuria- \par june 2020 prot/cr 1.5g\par dec 29 2020 prot.cr 0.1\par june 2022 0.1\par July 0.3\par July 0.2\par August 0.7 \par August 0.2\par Sept 0.7 \par oct 0.2\par nov 0.3 \par repeat ua, prot/cr today for trend\par -Proteinuria currently stable during pregnancy \par -check trend today\par #SLE-Pt has had negative lupus serologies in the past & lupus serologies currently -ve in Nov c3,c4,dsdna\par #HTN- \par BP stable today, however pt reports since 22 weeks uptrending avg BP- was 110/70s now 120/80s \par on Labetalol 100mg daily and Amlodipine 10mg daily which she takes in evening\par BP in office ok\par she will monitor BP as she is now; if she notices any elevated BP she will check it more frequently and inform us if elevated\par \par  #Anemia-\par -Drop in Hg from 11 in July to 9.8 in Aug with hapto < 20 (normal bili & LDH). \par -?Hx of hemolytic anemia pre-pregnancy likely from lupus. \par -follows with Dr. Troy Gomez. b12 supplementation\par - ADAMTS 13 and APLs labs stable\par -Continue PO iron daily; she is on b12 \par -Check iron studies, CBC, hemolysis labs today\par \par #high risk pregnancy- no sx; BP stable\par -she is on Asa for PEC prophylaxis\par -serology sent at dr forrest office July 2022- mild phosphotidylserine + and + silica clotting but neg DRVVT\par -monitor PEC labs. \par \par  \par  \par \par  \par \par

## 2022-12-06 NOTE — HISTORY OF PRESENT ILLNESS
[FreeTextEntry1] : 31 weeks gestation\par Reports BP 120s/80s avg; Takes Amlodipine 10mg and Labetalol 100mg in evening \par Last fetal sono: 2 weeks ago weight good- 2.9lbs 28 weeks\par \par Denies HA this week; stopped working last Wednesday; \par Feels very tired\par Patient denies any blurred vision, double vision, headaches, hand swelling, leg swelling, or RUQ pain\par Patient denies any burning or pain with urination\par Reports Frothy urine increased\par Other ROS neg\par \par Meds reconciled: b12 500units daily; ferrous sulfate daily; asa 162mg; imuran 100mg; plaquenil 200mg; amlodipine and labetolol as above; prenatal vitamin

## 2022-12-07 ENCOUNTER — NON-APPOINTMENT (OUTPATIENT)
Age: 32
End: 2022-12-07

## 2022-12-07 ENCOUNTER — APPOINTMENT (OUTPATIENT)
Dept: OBGYN | Facility: CLINIC | Age: 32
End: 2022-12-07

## 2022-12-07 ENCOUNTER — ASOB RESULT (OUTPATIENT)
Age: 32
End: 2022-12-07

## 2022-12-07 VITALS
HEIGHT: 60 IN | BODY MASS INDEX: 31.41 KG/M2 | SYSTOLIC BLOOD PRESSURE: 127 MMHG | DIASTOLIC BLOOD PRESSURE: 87 MMHG | WEIGHT: 160 LBS | HEART RATE: 101 BPM

## 2022-12-07 PROCEDURE — 76818 FETAL BIOPHYS PROFILE W/NST: CPT

## 2022-12-07 PROCEDURE — 0502F SUBSEQUENT PRENATAL CARE: CPT

## 2022-12-09 ENCOUNTER — RX RENEWAL (OUTPATIENT)
Age: 32
End: 2022-12-09

## 2022-12-09 LAB
ALBUMIN SERPL ELPH-MCNC: 3.4 G/DL
ALBUMIN SERPL ELPH-MCNC: 3.4 G/DL
ALP BLD-CCNC: 115 U/L
ALT SERPL-CCNC: 14 U/L
ANION GAP SERPL CALC-SCNC: 13 MMOL/L
APPEARANCE: CLEAR
AST SERPL-CCNC: 26 U/L
BACTERIA: NEGATIVE
BASOPHILS # BLD AUTO: 0.03 K/UL
BASOPHILS NFR BLD AUTO: 0.5 %
BILIRUB DIRECT SERPL-MCNC: 0 MG/DL
BILIRUB INDIRECT SERPL-MCNC: 0.1 MG/DL
BILIRUB SERPL-MCNC: <0.2 MG/DL
BILIRUBIN URINE: NEGATIVE
BLOOD URINE: NEGATIVE
BUN SERPL-MCNC: 18 MG/DL
CALCIUM SERPL-MCNC: 9 MG/DL
CHLORIDE SERPL-SCNC: 107 MMOL/L
CO2 SERPL-SCNC: 20 MMOL/L
COLOR: YELLOW
CREAT SERPL-MCNC: 1.49 MG/DL
CREAT SPEC-SCNC: 56 MG/DL
CREAT/PROT UR: 0.7 RATIO
EGFR: 48 ML/MIN/1.73M2
EOSINOPHIL # BLD AUTO: 0.14 K/UL
EOSINOPHIL NFR BLD AUTO: 2.5 %
FERRITIN SERPL-MCNC: 24 NG/ML
GLUCOSE QUALITATIVE U: NEGATIVE
GLUCOSE SERPL-MCNC: 93 MG/DL
HAPTOGLOB SERPL-MCNC: <20 MG/DL
HCT VFR BLD CALC: 30.5 %
HGB BLD-MCNC: 9.6 G/DL
HYALINE CASTS: 0 /LPF
IMM GRANULOCYTES NFR BLD AUTO: 1.1 %
IRON SATN MFR SERPL: 39 %
IRON SERPL-MCNC: 163 UG/DL
KETONES URINE: NEGATIVE
LDH SERPL-CCNC: 268 U/L
LEUKOCYTE ESTERASE URINE: NEGATIVE
LYMPHOCYTES # BLD AUTO: 1.12 K/UL
LYMPHOCYTES NFR BLD AUTO: 20.3 %
MAGNESIUM SERPL-MCNC: 1.8 MG/DL
MAN DIFF?: NORMAL
MCHC RBC-ENTMCNC: 31.5 GM/DL
MCHC RBC-ENTMCNC: 33.3 PG
MCV RBC AUTO: 105.9 FL
MICROSCOPIC-UA: NORMAL
MONOCYTES # BLD AUTO: 0.37 K/UL
MONOCYTES NFR BLD AUTO: 6.7 %
NEUTROPHILS # BLD AUTO: 3.79 K/UL
NEUTROPHILS NFR BLD AUTO: 68.9 %
NITRITE URINE: NEGATIVE
PH URINE: 6.5
PHOSPHATE SERPL-MCNC: 4.7 MG/DL
PLATELET # BLD AUTO: 161 K/UL
POTASSIUM SERPL-SCNC: 4.3 MMOL/L
PROT SERPL-MCNC: 5.8 G/DL
PROT UR-MCNC: 38 MG/DL
PROTEIN URINE: ABNORMAL
RBC # BLD: 2.88 M/UL
RBC # FLD: 15.2 %
RED BLOOD CELLS URINE: 5 /HPF
SODIUM SERPL-SCNC: 139 MMOL/L
SPECIFIC GRAVITY URINE: 1.01
SQUAMOUS EPITHELIAL CELLS: 2 /HPF
TIBC SERPL-MCNC: 418 UG/DL
UIBC SERPL-MCNC: 254 UG/DL
URATE SERPL-MCNC: 9 MG/DL
UROBILINOGEN URINE: NORMAL
WBC # FLD AUTO: 5.51 K/UL
WHITE BLOOD CELLS URINE: 2 /HPF

## 2022-12-12 ENCOUNTER — TRANSCRIPTION ENCOUNTER (OUTPATIENT)
Age: 32
End: 2022-12-12

## 2022-12-13 ENCOUNTER — NON-APPOINTMENT (OUTPATIENT)
Age: 32
End: 2022-12-13

## 2022-12-14 ENCOUNTER — APPOINTMENT (OUTPATIENT)
Dept: OBGYN | Facility: CLINIC | Age: 32
End: 2022-12-14

## 2022-12-14 ENCOUNTER — ASOB RESULT (OUTPATIENT)
Age: 32
End: 2022-12-14

## 2022-12-14 VITALS
SYSTOLIC BLOOD PRESSURE: 122 MMHG | BODY MASS INDEX: 31.61 KG/M2 | HEIGHT: 60 IN | WEIGHT: 161 LBS | DIASTOLIC BLOOD PRESSURE: 68 MMHG

## 2022-12-14 DIAGNOSIS — Z23 ENCOUNTER FOR IMMUNIZATION: ICD-10-CM

## 2022-12-14 PROCEDURE — 76818 FETAL BIOPHYS PROFILE W/NST: CPT

## 2022-12-14 PROCEDURE — 76816 OB US FOLLOW-UP PER FETUS: CPT

## 2022-12-14 PROCEDURE — 36415 COLL VENOUS BLD VENIPUNCTURE: CPT

## 2022-12-14 PROCEDURE — 0502F SUBSEQUENT PRENATAL CARE: CPT

## 2022-12-15 ENCOUNTER — TRANSCRIPTION ENCOUNTER (OUTPATIENT)
Age: 32
End: 2022-12-15

## 2022-12-15 LAB
ALBUMIN SERPL ELPH-MCNC: 3.3 G/DL
ALBUMIN SERPL ELPH-MCNC: 3.3 G/DL
ALP BLD-CCNC: 136 U/L
ALT SERPL-CCNC: 12 U/L
ANION GAP SERPL CALC-SCNC: 19 MMOL/L
APPEARANCE: CLEAR
AST SERPL-CCNC: 15 U/L
BACTERIA: NEGATIVE
BILIRUB DIRECT SERPL-MCNC: 0.1 MG/DL
BILIRUB INDIRECT SERPL-MCNC: 0.1 MG/DL
BILIRUB SERPL-MCNC: 0.2 MG/DL
BILIRUBIN URINE: NEGATIVE
BLOOD URINE: NEGATIVE
BUN SERPL-MCNC: 23 MG/DL
CALCIUM SERPL-MCNC: 8.9 MG/DL
CHLORIDE SERPL-SCNC: 103 MMOL/L
CO2 SERPL-SCNC: 16 MMOL/L
COLOR: NORMAL
CREAT SERPL-MCNC: 1.43 MG/DL
CREAT SPEC-SCNC: 45 MG/DL
CREAT/PROT UR: 0.6 RATIO
EGFR: 51 ML/MIN/1.73M2
GLUCOSE QUALITATIVE U: NEGATIVE
GLUCOSE SERPL-MCNC: 57 MG/DL
HAPTOGLOB SERPL-MCNC: 70 MG/DL
HYALINE CASTS: 0 /LPF
IRON SATN MFR SERPL: 30 %
IRON SERPL-MCNC: 123 UG/DL
KETONES URINE: NEGATIVE
LEUKOCYTE ESTERASE URINE: NEGATIVE
MAGNESIUM SERPL-MCNC: 1.9 MG/DL
MICROSCOPIC-UA: NORMAL
NITRITE URINE: NEGATIVE
PH URINE: 6
PHOSPHATE SERPL-MCNC: 3.2 MG/DL
POTASSIUM SERPL-SCNC: 3.8 MMOL/L
PROT SERPL-MCNC: 6 G/DL
PROT UR-MCNC: 24 MG/DL
PROTEIN URINE: NORMAL
RED BLOOD CELLS URINE: 1 /HPF
SODIUM SERPL-SCNC: 138 MMOL/L
SPECIFIC GRAVITY URINE: 1.01
SQUAMOUS EPITHELIAL CELLS: 2 /HPF
TIBC SERPL-MCNC: 410 UG/DL
UIBC SERPL-MCNC: 286 UG/DL
URATE SERPL-MCNC: 9.3 MG/DL
UROBILINOGEN URINE: NORMAL
WHITE BLOOD CELLS URINE: 4 /HPF

## 2022-12-19 ENCOUNTER — ASOB RESULT (OUTPATIENT)
Age: 32
End: 2022-12-19

## 2022-12-19 ENCOUNTER — NON-APPOINTMENT (OUTPATIENT)
Age: 32
End: 2022-12-19

## 2022-12-19 ENCOUNTER — APPOINTMENT (OUTPATIENT)
Dept: NEPHROLOGY | Facility: CLINIC | Age: 32
End: 2022-12-19
Payer: COMMERCIAL

## 2022-12-19 ENCOUNTER — APPOINTMENT (OUTPATIENT)
Dept: OBGYN | Facility: CLINIC | Age: 32
End: 2022-12-19

## 2022-12-19 VITALS
BODY MASS INDEX: 32.39 KG/M2 | WEIGHT: 165 LBS | HEIGHT: 60 IN | HEART RATE: 76 BPM | SYSTOLIC BLOOD PRESSURE: 116 MMHG | DIASTOLIC BLOOD PRESSURE: 78 MMHG

## 2022-12-19 PROCEDURE — 99213 OFFICE O/P EST LOW 20 MIN: CPT | Mod: 95

## 2022-12-19 PROCEDURE — 76818 FETAL BIOPHYS PROFILE W/NST: CPT

## 2022-12-19 PROCEDURE — 0502F SUBSEQUENT PRENATAL CARE: CPT

## 2022-12-19 NOTE — ASSESSMENT
[FreeTextEntry1] : 31 year old female with h/o lupus nephritis class III, with CKD, now 33 weeks gestation here for continued pregnancy follow up\par \par #lupus nephritis with CKD 3\par -Class III- Aug 2020 Renal biopsy: IC GN- diff mesangial and focal mp; global gs 56% ; focal seg 22%; IFTA mod-severe 50%; mild AS; 15/27 gs glomeruli; full house EM\par -Creatinine hx per pt: \par 2019 cr 1.7-1.9\par 2020 cr 2.3 at time of biopsy\par cr downtrended with treatment 1.9 Dec 2020\par cr 1.5 2021 (3 tabs aza)\par cr 1.6-1.7 2022 when aza decreased and approx for one year it has been stable \par -cr July 2022 1.4 \par -cr Aug 2022 1.3\par -cr sept 1.43\par -cr oct 1.40\par -cr nov with OB 1.49\par -Nov 9th 1.43\par -nov 22 1.46\par -Dec 1.43\par -cr has thus far been stable during pregnancy \par -cont plaquenil 200 qD, Imuran 100mg/day\par  \par #Non nephrotic range proteinuria- \par june 2020 prot/cr 1.5g\par dec 29 2020 prot.cr 0.1\par june 2022 0.1\par July 0.3\par July 0.2\par August 0.7 \par August 0.2\par Sept 0.7 \par oct 0.2\par nov 0.3 \par dec 0.7\par dec 0.6\par  \par -Proteinuria currently stable during pregnancy, mild uptrend\par \par #SLE-Pt has had negative lupus serologies in the past & lupus serologies currently -ve in Nov c3,c4,dsdna\par #HTN- \par BP stable, however pt reports since 22 weeks uptrending avg BP- was 110/70s then 120/80s, last week during viral illness covid 130/80s now bck down 120s/mid80s\par on Labetalol 100mg daily and Amlodipine 10mg daily which she takes in evening\par she will monitor BP as she is now; if she notices any elevated BP she will check it more frequently and inform us if elevated\par can always add mng labetalol if evening higher\par \par  #Anemia-\par -Drop in Hg from 11 in July to 9.8 in Aug with hapto < 20 (normal bili & LDH). \par -?Hx of hemolytic anemia pre-pregnancy likely from lupus. \par -follows with Dr. Troy Gomez. b12 supplementation\par - ADAMTS 13 and APLs labs stable\par -Continue PO iron daily; she is on b12 \par \par #high risk pregnancy- no sx; BP stable\par -she is on Asa for PEC prophylaxis\par -serology sent at dr forrest office July 2022- mild phosphotidylserine + and + silica clotting but neg DRVVT\par -monitor PEC labs. \par \par  \par  \par

## 2022-12-19 NOTE — HISTORY OF PRESENT ILLNESS
[Home] : at home, [unfilled] , at the time of the visit. [Other Location: e.g. Home (Enter Location, City,State)___] : at [unfilled] [Verbal consent obtained from patient] : the patient, [unfilled] [FreeTextEntry1] : TEB done\par Now 33 weeks gestation\par Pt tested + for covid approx week dec 8th; Still with some residual symptoms.  She had a cough which has improved but simus and runny nose still there; now tested neg\par  \par Patient denies any blurred vision, double vision, headaches, hand swelling, leg swelling, or RUQ pain\par Patient denies any burning or pain with urination.\par feels very tired, fatigue \par \par BP trends had uptrended during her illness to 130/80s but now 120s/mid80s\par BP still slightly more elevated in evenings 128/90 but not higher\par She checks her BP various times of the day\par \par Compliant with all medications\par On Imuran 100mg daily and Plaquenil\par On Labetalol 100mg and amlodipine 10mg every evening\par \par She saw Dr chan last week\par labs done\par cr stable 1.43\par prot/cr 0.6 stable\par UA 9.3  \par reviewed labs with pt in detail

## 2022-12-19 NOTE — PHYSICAL EXAM
[General Appearance - Alert] : alert [General Appearance - In No Acute Distress] : in no acute distress [General Appearance - Well Nourished] : well nourished [General Appearance - Well Developed] : well developed [Sclera] : the sclera and conjunctiva were normal [Outer Ear] : the ears and nose were normal in appearance [Respiration, Rhythm And Depth] : normal respiratory rhythm and effort

## 2022-12-22 ENCOUNTER — APPOINTMENT (OUTPATIENT)
Dept: OBGYN | Facility: CLINIC | Age: 32
End: 2022-12-22
Payer: COMMERCIAL

## 2022-12-22 ENCOUNTER — ASOB RESULT (OUTPATIENT)
Age: 32
End: 2022-12-22

## 2022-12-22 PROCEDURE — 76818 FETAL BIOPHYS PROFILE W/NST: CPT

## 2022-12-22 PROCEDURE — 59025 FETAL NON-STRESS TEST: CPT

## 2022-12-22 PROCEDURE — 0502F SUBSEQUENT PRENATAL CARE: CPT

## 2022-12-27 ENCOUNTER — APPOINTMENT (OUTPATIENT)
Dept: OBGYN | Facility: CLINIC | Age: 32
End: 2022-12-27
Payer: COMMERCIAL

## 2022-12-27 ENCOUNTER — NON-APPOINTMENT (OUTPATIENT)
Age: 32
End: 2022-12-27

## 2022-12-27 ENCOUNTER — ASOB RESULT (OUTPATIENT)
Age: 32
End: 2022-12-27

## 2022-12-27 PROCEDURE — 0502F SUBSEQUENT PRENATAL CARE: CPT

## 2022-12-27 PROCEDURE — 76818 FETAL BIOPHYS PROFILE W/NST: CPT

## 2022-12-29 ENCOUNTER — APPOINTMENT (OUTPATIENT)
Dept: OBGYN | Facility: CLINIC | Age: 32
End: 2022-12-29
Payer: COMMERCIAL

## 2022-12-29 ENCOUNTER — ASOB RESULT (OUTPATIENT)
Age: 32
End: 2022-12-29

## 2022-12-29 PROCEDURE — 76818 FETAL BIOPHYS PROFILE W/NST: CPT

## 2022-12-29 PROCEDURE — 0502F SUBSEQUENT PRENATAL CARE: CPT

## 2023-01-03 ENCOUNTER — NON-APPOINTMENT (OUTPATIENT)
Age: 33
End: 2023-01-03

## 2023-01-03 ENCOUNTER — APPOINTMENT (OUTPATIENT)
Dept: OBGYN | Facility: CLINIC | Age: 33
End: 2023-01-03
Payer: COMMERCIAL

## 2023-01-03 ENCOUNTER — ASOB RESULT (OUTPATIENT)
Age: 33
End: 2023-01-03

## 2023-01-03 VITALS
SYSTOLIC BLOOD PRESSURE: 117 MMHG | WEIGHT: 168 LBS | HEART RATE: 97 BPM | DIASTOLIC BLOOD PRESSURE: 80 MMHG | BODY MASS INDEX: 32.98 KG/M2 | HEIGHT: 60 IN

## 2023-01-03 PROCEDURE — 0502F SUBSEQUENT PRENATAL CARE: CPT

## 2023-01-03 PROCEDURE — 76818 FETAL BIOPHYS PROFILE W/NST: CPT

## 2023-01-03 PROCEDURE — 59426 ANTEPARTUM CARE ONLY: CPT

## 2023-01-04 ENCOUNTER — APPOINTMENT (OUTPATIENT)
Dept: NEPHROLOGY | Facility: CLINIC | Age: 33
End: 2023-01-04
Payer: COMMERCIAL

## 2023-01-04 ENCOUNTER — TRANSCRIPTION ENCOUNTER (OUTPATIENT)
Age: 33
End: 2023-01-04

## 2023-01-04 VITALS
SYSTOLIC BLOOD PRESSURE: 106 MMHG | HEIGHT: 60 IN | OXYGEN SATURATION: 97 % | WEIGHT: 167 LBS | TEMPERATURE: 97.6 F | HEART RATE: 105 BPM | BODY MASS INDEX: 32.79 KG/M2 | DIASTOLIC BLOOD PRESSURE: 76 MMHG

## 2023-01-04 LAB
ALBUMIN SERPL ELPH-MCNC: 3.2 G/DL
ALP BLD-CCNC: 164 U/L
ALT SERPL-CCNC: 12 U/L
ANION GAP SERPL CALC-SCNC: 20 MMOL/L
AST SERPL-CCNC: 17 U/L
BASOPHILS # BLD AUTO: 0.02 K/UL
BASOPHILS NFR BLD AUTO: 0.4 %
BILIRUB SERPL-MCNC: 0.2 MG/DL
BUN SERPL-MCNC: 22 MG/DL
CALCIUM SERPL-MCNC: 8.7 MG/DL
CHLORIDE SERPL-SCNC: 103 MMOL/L
CO2 SERPL-SCNC: 16 MMOL/L
CREAT SERPL-MCNC: 1.69 MG/DL
EGFR: 41 ML/MIN/1.73M2
EOSINOPHIL # BLD AUTO: 0.15 K/UL
EOSINOPHIL NFR BLD AUTO: 2.7 %
GLUCOSE SERPL-MCNC: 118 MG/DL
HCT VFR BLD CALC: 31.5 %
HGB BLD-MCNC: 10.1 G/DL
IMM GRANULOCYTES NFR BLD AUTO: 1.4 %
LYMPHOCYTES # BLD AUTO: 1.09 K/UL
LYMPHOCYTES NFR BLD AUTO: 19.7 %
MAN DIFF?: NORMAL
MCHC RBC-ENTMCNC: 32.1 GM/DL
MCHC RBC-ENTMCNC: 33.1 PG
MCV RBC AUTO: 103.3 FL
MONOCYTES # BLD AUTO: 0.19 K/UL
MONOCYTES NFR BLD AUTO: 3.4 %
NEUTROPHILS # BLD AUTO: 3.99 K/UL
NEUTROPHILS NFR BLD AUTO: 72.4 %
PLATELET # BLD AUTO: 181 K/UL
POTASSIUM SERPL-SCNC: 3.8 MMOL/L
PROT SERPL-MCNC: 6 G/DL
RBC # BLD: 3.05 M/UL
RBC # FLD: 15.5 %
SODIUM SERPL-SCNC: 138 MMOL/L
WBC # FLD AUTO: 5.52 K/UL

## 2023-01-04 PROCEDURE — 99214 OFFICE O/P EST MOD 30 MIN: CPT | Mod: GC

## 2023-01-04 NOTE — REVIEW OF SYSTEMS
[Chills] : chills [Feeling Poorly] : feeling poorly [Feeling Tired] : feeling tired [Heart Rate Is Fast] : fast heart rate [Shortness Of Breath] : shortness of breath [Heartburn] : heartburn [Feelings Of Weakness] : feelings of weakness

## 2023-01-05 ENCOUNTER — NON-APPOINTMENT (OUTPATIENT)
Age: 33
End: 2023-01-05

## 2023-01-05 ENCOUNTER — INPATIENT (INPATIENT)
Facility: HOSPITAL | Age: 33
LOS: 4 days | Discharge: HOME | End: 2023-01-10
Attending: OBSTETRICS & GYNECOLOGY | Admitting: OBSTETRICS & GYNECOLOGY
Payer: COMMERCIAL

## 2023-01-05 ENCOUNTER — APPOINTMENT (OUTPATIENT)
Dept: OBGYN | Facility: CLINIC | Age: 33
End: 2023-01-05

## 2023-01-05 VITALS — HEART RATE: 93 BPM | OXYGEN SATURATION: 100 %

## 2023-01-05 DIAGNOSIS — M32.14 GLOMERULAR DISEASE IN SYSTEMIC LUPUS ERYTHEMATOSUS: ICD-10-CM

## 2023-01-05 DIAGNOSIS — K08.409 PARTIAL LOSS OF TEETH, UNSPECIFIED CAUSE, UNSPECIFIED CLASS: Chronic | ICD-10-CM

## 2023-01-05 DIAGNOSIS — O14.13 SEVERE PRE-ECLAMPSIA, THIRD TRIMESTER: ICD-10-CM

## 2023-01-05 DIAGNOSIS — O26.899 OTHER SPECIFIED PREGNANCY RELATED CONDITIONS, UNSPECIFIED TRIMESTER: ICD-10-CM

## 2023-01-05 DIAGNOSIS — Z34.80 ENCOUNTER FOR SUPERVISION OF OTHER NORMAL PREGNANCY, UNSPECIFIED TRIMESTER: ICD-10-CM

## 2023-01-05 LAB
ALBUMIN SERPL ELPH-MCNC: 3.1 G/DL — LOW (ref 3.3–5)
ALBUMIN SERPL ELPH-MCNC: 3.3 G/DL
ALBUMIN SERPL ELPH-MCNC: 3.3 G/DL
ALP BLD-CCNC: 172 U/L
ALP SERPL-CCNC: 155 U/L — HIGH (ref 40–120)
ALT FLD-CCNC: 8 U/L — LOW (ref 10–45)
ALT SERPL-CCNC: 9 U/L
ANION GAP SERPL CALC-SCNC: 12 MMOL/L — SIGNIFICANT CHANGE UP (ref 5–17)
ANION GAP SERPL CALC-SCNC: 14 MMOL/L
APPEARANCE UR: CLEAR — SIGNIFICANT CHANGE UP
APPEARANCE: CLEAR
APTT BLD: 28.3 SEC — SIGNIFICANT CHANGE UP (ref 27.5–35.5)
APTT BLD: 28.3 SEC — SIGNIFICANT CHANGE UP (ref 27.5–35.5)
AST SERPL-CCNC: 17 U/L
AST SERPL-CCNC: 17 U/L — SIGNIFICANT CHANGE UP (ref 10–40)
BACTERIA # UR AUTO: NEGATIVE — SIGNIFICANT CHANGE UP
BACTERIA: NEGATIVE
BASOPHILS # BLD AUTO: 0.02 K/UL — SIGNIFICANT CHANGE UP (ref 0–0.2)
BASOPHILS # BLD AUTO: 0.03 K/UL
BASOPHILS # BLD AUTO: 0.03 K/UL — SIGNIFICANT CHANGE UP (ref 0–0.2)
BASOPHILS NFR BLD AUTO: 0.3 % — SIGNIFICANT CHANGE UP (ref 0–2)
BASOPHILS NFR BLD AUTO: 0.5 %
BASOPHILS NFR BLD AUTO: 0.5 % — SIGNIFICANT CHANGE UP (ref 0–2)
BILIRUB DIRECT SERPL-MCNC: 0.1 MG/DL
BILIRUB INDIRECT SERPL-MCNC: 0.1 MG/DL
BILIRUB SERPL-MCNC: 0.2 MG/DL
BILIRUB SERPL-MCNC: 0.2 MG/DL — SIGNIFICANT CHANGE UP (ref 0.2–1.2)
BILIRUB UR-MCNC: NEGATIVE — SIGNIFICANT CHANGE UP
BILIRUBIN URINE: NEGATIVE
BLD GP AB SCN SERPL QL: NEGATIVE — SIGNIFICANT CHANGE UP
BLOOD URINE: ABNORMAL
BUN SERPL-MCNC: 21 MG/DL — SIGNIFICANT CHANGE UP (ref 7–23)
BUN SERPL-MCNC: 22 MG/DL
CALCIUM SERPL-MCNC: 8.5 MG/DL — SIGNIFICANT CHANGE UP (ref 8.4–10.5)
CALCIUM SERPL-MCNC: 8.9 MG/DL
CHLORIDE SERPL-SCNC: 104 MMOL/L
CHLORIDE SERPL-SCNC: 106 MMOL/L — SIGNIFICANT CHANGE UP (ref 96–108)
CO2 SERPL-SCNC: 18 MMOL/L
CO2 SERPL-SCNC: 18 MMOL/L — LOW (ref 22–31)
COLOR SPEC: SIGNIFICANT CHANGE UP
COLOR: NORMAL
CREAT ?TM UR-MCNC: 55 MG/DL — SIGNIFICANT CHANGE UP
CREAT SERPL-MCNC: 1.6 MG/DL — HIGH (ref 0.5–1.3)
CREAT SERPL-MCNC: 1.67 MG/DL
CREAT SPEC-SCNC: 63 MG/DL
CREAT/PROT UR: 0.8 RATIO
DIFF PNL FLD: ABNORMAL
EGFR: 42 ML/MIN/1.73M2
EGFR: 44 ML/MIN/1.73M2 — LOW
EOSINOPHIL # BLD AUTO: 0.12 K/UL — SIGNIFICANT CHANGE UP (ref 0–0.5)
EOSINOPHIL # BLD AUTO: 0.12 K/UL — SIGNIFICANT CHANGE UP (ref 0–0.5)
EOSINOPHIL # BLD AUTO: 0.16 K/UL
EOSINOPHIL NFR BLD AUTO: 1.9 % — SIGNIFICANT CHANGE UP (ref 0–6)
EOSINOPHIL NFR BLD AUTO: 1.9 % — SIGNIFICANT CHANGE UP (ref 0–6)
EOSINOPHIL NFR BLD AUTO: 2.6 %
EPI CELLS # UR: 1 /HPF — SIGNIFICANT CHANGE UP
FERRITIN SERPL-MCNC: 34 NG/ML
FIBRINOGEN PPP-MCNC: 548 MG/DL — HIGH (ref 200–445)
GLUCOSE QUALITATIVE U: NEGATIVE
GLUCOSE SERPL-MCNC: 88 MG/DL
GLUCOSE SERPL-MCNC: 92 MG/DL — SIGNIFICANT CHANGE UP (ref 70–99)
GLUCOSE UR QL: NEGATIVE — SIGNIFICANT CHANGE UP
HAPTOGLOB SERPL-MCNC: <20 MG/DL
HBV SURFACE AG SERPL QL IA: SIGNIFICANT CHANGE UP
HCT VFR BLD CALC: 28 % — LOW (ref 34.5–45)
HCT VFR BLD CALC: 28.9 % — LOW (ref 34.5–45)
HCT VFR BLD CALC: 31.4 %
HGB BLD-MCNC: 10.3 G/DL
HGB BLD-MCNC: 9.6 G/DL — LOW (ref 11.5–15.5)
HGB BLD-MCNC: 9.9 G/DL — LOW (ref 11.5–15.5)
HYALINE CASTS: 1 /LPF
IMM GRANULOCYTES NFR BLD AUTO: 1.4 % — HIGH (ref 0–0.9)
IMM GRANULOCYTES NFR BLD AUTO: 1.6 %
IMM GRANULOCYTES NFR BLD AUTO: 1.9 % — HIGH (ref 0–0.9)
INR BLD: 0.89 RATIO — SIGNIFICANT CHANGE UP (ref 0.88–1.16)
IRON SATN MFR SERPL: 21 %
IRON SERPL-MCNC: 87 UG/DL
KETONES UR-MCNC: NEGATIVE — SIGNIFICANT CHANGE UP
KETONES URINE: NEGATIVE
LDH SERPL L TO P-CCNC: 246 U/L — HIGH (ref 50–242)
LDH SERPL-CCNC: 259 U/L
LEUKOCYTE ESTERASE UR-ACNC: NEGATIVE — SIGNIFICANT CHANGE UP
LEUKOCYTE ESTERASE URINE: NEGATIVE
LYMPHOCYTES # BLD AUTO: 1.09 K/UL — SIGNIFICANT CHANGE UP (ref 1–3.3)
LYMPHOCYTES # BLD AUTO: 1.1 K/UL
LYMPHOCYTES # BLD AUTO: 1.25 K/UL — SIGNIFICANT CHANGE UP (ref 1–3.3)
LYMPHOCYTES # BLD AUTO: 17.1 % — SIGNIFICANT CHANGE UP (ref 13–44)
LYMPHOCYTES # BLD AUTO: 20.1 % — SIGNIFICANT CHANGE UP (ref 13–44)
LYMPHOCYTES NFR BLD AUTO: 17.7 %
MAN DIFF?: NORMAL
MCHC RBC-ENTMCNC: 32.8 GM/DL
MCHC RBC-ENTMCNC: 33.4 PG — SIGNIFICANT CHANGE UP (ref 27–34)
MCHC RBC-ENTMCNC: 33.8 PG
MCHC RBC-ENTMCNC: 33.8 PG — SIGNIFICANT CHANGE UP (ref 27–34)
MCHC RBC-ENTMCNC: 34.3 GM/DL — SIGNIFICANT CHANGE UP (ref 32–36)
MCHC RBC-ENTMCNC: 34.3 GM/DL — SIGNIFICANT CHANGE UP (ref 32–36)
MCV RBC AUTO: 103 FL
MCV RBC AUTO: 97.6 FL — SIGNIFICANT CHANGE UP (ref 80–100)
MCV RBC AUTO: 98.6 FL — SIGNIFICANT CHANGE UP (ref 80–100)
MICROSCOPIC-UA: NORMAL
MONOCYTES # BLD AUTO: 0.37 K/UL — SIGNIFICANT CHANGE UP (ref 0–0.9)
MONOCYTES # BLD AUTO: 0.38 K/UL
MONOCYTES # BLD AUTO: 0.39 K/UL — SIGNIFICANT CHANGE UP (ref 0–0.9)
MONOCYTES NFR BLD AUTO: 5.8 % — SIGNIFICANT CHANGE UP (ref 2–14)
MONOCYTES NFR BLD AUTO: 6.1 %
MONOCYTES NFR BLD AUTO: 6.3 % — SIGNIFICANT CHANGE UP (ref 2–14)
NEUTROPHILS # BLD AUTO: 4.31 K/UL — SIGNIFICANT CHANGE UP (ref 1.8–7.4)
NEUTROPHILS # BLD AUTO: 4.45 K/UL
NEUTROPHILS # BLD AUTO: 4.68 K/UL — SIGNIFICANT CHANGE UP (ref 1.8–7.4)
NEUTROPHILS NFR BLD AUTO: 69.5 % — SIGNIFICANT CHANGE UP (ref 43–77)
NEUTROPHILS NFR BLD AUTO: 71.5 %
NEUTROPHILS NFR BLD AUTO: 73.3 % — SIGNIFICANT CHANGE UP (ref 43–77)
NITRITE UR-MCNC: NEGATIVE — SIGNIFICANT CHANGE UP
NITRITE URINE: NEGATIVE
NRBC # BLD: 0 /100 WBCS — SIGNIFICANT CHANGE UP (ref 0–0)
NRBC # BLD: 0 /100 WBCS — SIGNIFICANT CHANGE UP (ref 0–0)
PH UR: 6.5 — SIGNIFICANT CHANGE UP (ref 5–8)
PH URINE: 6.5
PHOSPHATE SERPL-MCNC: 3.8 MG/DL
PLATELET # BLD AUTO: 153 K/UL — SIGNIFICANT CHANGE UP (ref 150–400)
PLATELET # BLD AUTO: 160 K/UL — SIGNIFICANT CHANGE UP (ref 150–400)
PLATELET # BLD AUTO: 194 K/UL
POTASSIUM SERPL-MCNC: 4.3 MMOL/L — SIGNIFICANT CHANGE UP (ref 3.5–5.3)
POTASSIUM SERPL-SCNC: 4.3 MMOL/L — SIGNIFICANT CHANGE UP (ref 3.5–5.3)
POTASSIUM SERPL-SCNC: 4.7 MMOL/L
PROT ?TM UR-MCNC: 68 MG/DL — HIGH (ref 0–12)
PROT ?TM UR-MCNC: 69 MG/DL — HIGH (ref 0–12)
PROT SERPL-MCNC: 6.1 G/DL
PROT SERPL-MCNC: 6.2 G/DL — SIGNIFICANT CHANGE UP (ref 6–8.3)
PROT UR-MCNC: 48 MG/DL
PROT UR-MCNC: ABNORMAL
PROT/CREAT UR-RTO: 1.2 RATIO — HIGH (ref 0–0.2)
PROTEIN URINE: ABNORMAL
PROTHROM AB SERPL-ACNC: 10.3 SEC — LOW (ref 10.5–13.4)
RBC # BLD: 2.84 M/UL — LOW (ref 3.8–5.2)
RBC # BLD: 2.96 M/UL — LOW (ref 3.8–5.2)
RBC # BLD: 3.05 M/UL
RBC # FLD: 14.4 % — SIGNIFICANT CHANGE UP (ref 10.3–14.5)
RBC # FLD: 14.6 % — HIGH (ref 10.3–14.5)
RBC # FLD: 15 %
RBC CASTS # UR COMP ASSIST: 1 /HPF — SIGNIFICANT CHANGE UP (ref 0–4)
RED BLOOD CELLS URINE: 2 /HPF
RH IG SCN BLD-IMP: POSITIVE — SIGNIFICANT CHANGE UP
SODIUM SERPL-SCNC: 136 MMOL/L
SODIUM SERPL-SCNC: 136 MMOL/L — SIGNIFICANT CHANGE UP (ref 135–145)
SP GR SPEC: 1.01 — LOW (ref 1.01–1.02)
SPECIFIC GRAVITY URINE: 1.01
SQUAMOUS EPITHELIAL CELLS: 1 /HPF
TIBC SERPL-MCNC: 412 UG/DL
UIBC SERPL-MCNC: 325 UG/DL
URATE SERPL-MCNC: 9.8 MG/DL
URATE SERPL-MCNC: 9.9 MG/DL — HIGH (ref 2.5–7)
UROBILINOGEN FLD QL: NEGATIVE — SIGNIFICANT CHANGE UP
UROBILINOGEN URINE: NORMAL
WBC # BLD: 6.21 K/UL — SIGNIFICANT CHANGE UP (ref 3.8–10.5)
WBC # BLD: 6.38 K/UL — SIGNIFICANT CHANGE UP (ref 3.8–10.5)
WBC # FLD AUTO: 6.21 K/UL — SIGNIFICANT CHANGE UP (ref 3.8–10.5)
WBC # FLD AUTO: 6.22 K/UL
WBC # FLD AUTO: 6.38 K/UL — SIGNIFICANT CHANGE UP (ref 3.8–10.5)
WBC UR QL: 1 /HPF — SIGNIFICANT CHANGE UP (ref 0–5)
WHITE BLOOD CELLS URINE: 2 /HPF

## 2023-01-05 PROCEDURE — 99222 1ST HOSP IP/OBS MODERATE 55: CPT

## 2023-01-05 RX ORDER — LABETALOL HCL 100 MG
100 TABLET ORAL DAILY
Refills: 0 | Status: DISCONTINUED | OUTPATIENT
Start: 2023-01-05 | End: 2023-01-09

## 2023-01-05 RX ORDER — MAGNESIUM SULFATE 500 MG/ML
1 VIAL (ML) INJECTION ONCE
Refills: 0 | Status: DISCONTINUED | OUTPATIENT
Start: 2023-01-05 | End: 2023-01-06

## 2023-01-05 RX ORDER — MAGNESIUM SULFATE 500 MG/ML
1 VIAL (ML) INJECTION ONCE
Refills: 0 | Status: COMPLETED | OUTPATIENT
Start: 2023-01-05 | End: 2023-01-05

## 2023-01-05 RX ORDER — AMPICILLIN TRIHYDRATE 250 MG
2 CAPSULE ORAL ONCE
Refills: 0 | Status: COMPLETED | OUTPATIENT
Start: 2023-01-05 | End: 2023-01-05

## 2023-01-05 RX ORDER — CITRIC ACID/SODIUM CITRATE 300-500 MG
15 SOLUTION, ORAL ORAL EVERY 6 HOURS
Refills: 0 | Status: DISCONTINUED | OUTPATIENT
Start: 2023-01-05 | End: 2023-01-07

## 2023-01-05 RX ORDER — HYDROXYCHLOROQUINE SULFATE 200 MG
200 TABLET ORAL DAILY
Refills: 0 | Status: DISCONTINUED | OUTPATIENT
Start: 2023-01-05 | End: 2023-01-10

## 2023-01-05 RX ORDER — FAMOTIDINE 10 MG/ML
20 INJECTION INTRAVENOUS DAILY
Refills: 0 | Status: DISCONTINUED | OUTPATIENT
Start: 2023-01-05 | End: 2023-01-06

## 2023-01-05 RX ORDER — MAGNESIUM SULFATE 500 MG/ML
1 VIAL (ML) INJECTION ONCE
Refills: 0 | Status: DISCONTINUED | OUTPATIENT
Start: 2023-01-05 | End: 2023-01-05

## 2023-01-05 RX ORDER — AZATHIOPRINE 100 MG/1
100 TABLET ORAL DAILY
Refills: 0 | Status: DISCONTINUED | OUTPATIENT
Start: 2023-01-05 | End: 2023-01-10

## 2023-01-05 RX ORDER — OXYTOCIN 10 UNIT/ML
333.33 VIAL (ML) INJECTION
Qty: 20 | Refills: 0 | Status: DISCONTINUED | OUTPATIENT
Start: 2023-01-05 | End: 2023-01-10

## 2023-01-05 RX ORDER — PANTOPRAZOLE SODIUM 20 MG/1
20 TABLET, DELAYED RELEASE ORAL
Refills: 0 | Status: DISCONTINUED | OUTPATIENT
Start: 2023-01-05 | End: 2023-01-10

## 2023-01-05 RX ORDER — CHLORHEXIDINE GLUCONATE 213 G/1000ML
1 SOLUTION TOPICAL ONCE
Refills: 0 | Status: DISCONTINUED | OUTPATIENT
Start: 2023-01-05 | End: 2023-01-07

## 2023-01-05 RX ORDER — AMPICILLIN TRIHYDRATE 250 MG
1 CAPSULE ORAL EVERY 4 HOURS
Refills: 0 | Status: DISCONTINUED | OUTPATIENT
Start: 2023-01-05 | End: 2023-01-07

## 2023-01-05 RX ORDER — SODIUM CHLORIDE 9 MG/ML
1000 INJECTION, SOLUTION INTRAVENOUS
Refills: 0 | Status: DISCONTINUED | OUTPATIENT
Start: 2023-01-05 | End: 2023-01-07

## 2023-01-05 RX ORDER — AMLODIPINE BESYLATE 2.5 MG/1
10 TABLET ORAL DAILY
Refills: 0 | Status: DISCONTINUED | OUTPATIENT
Start: 2023-01-05 | End: 2023-01-10

## 2023-01-05 RX ADMIN — PANTOPRAZOLE SODIUM 20 MILLIGRAM(S): 20 TABLET, DELAYED RELEASE ORAL at 22:14

## 2023-01-05 RX ADMIN — AZATHIOPRINE 100 MILLIGRAM(S): 100 TABLET ORAL at 20:37

## 2023-01-05 RX ADMIN — Medication 108 GRAM(S): at 22:11

## 2023-01-05 RX ADMIN — Medication 300 GRAM(S): at 16:37

## 2023-01-05 RX ADMIN — AMLODIPINE BESYLATE 10 MILLIGRAM(S): 2.5 TABLET ORAL at 22:14

## 2023-01-05 RX ADMIN — Medication 200 GRAM(S): at 17:58

## 2023-01-05 RX ADMIN — Medication 100 MILLIGRAM(S): at 22:14

## 2023-01-05 RX ADMIN — Medication 200 MILLIGRAM(S): at 20:37

## 2023-01-05 NOTE — CONSULT NOTE ADULT - SUBJECTIVE AND OBJECTIVE BOX
[facility] DIVISION OF KIDNEY DISEASES AND HYPERTENSION -- [phone]  -- INITIAL CONSULT NOTE  --------------------------------------------------------------------------------  HPI: 32 year old female with h/o lupus nephritis class III, CKD3, chronic HTN now 35 weeks gestation with recent increase in BP, proteinuria & SCr sent in for induction due to concern for chronic HTN with superimposed severe PEC. Baseline creatinine was 1.4 during this pregnancy but recently michelle to 1.67 (1/4/23). UP/C previously 0.2-0.3 (10/19/22) has been increasing to 0.6 & now most recent 0.8 (1/4/23). Seen at Nephrology clinic with Dr. Wilson yesterday when she complained of BP elevation since the past two weeks. Since her last visit she reports her BP is uptrending. SBP in 130's the week prior & this week noted to be in 140's with complains of dry mouth, excessive fatigue & HA. Patient denies any blurred vision, double vision, hand swelling, leg swelling, or RUQ pain, burning or pain with urination. Admits to frothy urine. Compliant with all medications for SLE- Imuran 100mg daily and Plaquenil 200 daily. On Labetalol 100mg qD and amlodipine 10mg qd for HTN. No new meds added recently besides famotidine for GERD        Patient seen & examined. Denies chest pain, fever, chills, dysuria, hematuria, pus in urine, frothy urine, SOB, leg edema, N/V.      PAST HISTORY  --------------------------------------------------------------------------------  PAST MEDICAL & SURGICAL HISTORY:  Lupus nephritis      [place] teeth removed        FAMILY HISTORY:    PAST SOCIAL HISTORY:    ALLERGIES & MEDICATIONS  --------------------------------------------------------------------------------  Allergies    No Known Allergies    Intolerances      Standing Inpatient Medications  chlorhexidine 2% Cloths 1 Application(s) Topical once  citric acid/sodium citrate Solution 15 milliLiter(s) Oral every 6 hours  lactated ringers. 1000 milliLiter(s) IV Continuous <Continuous>  misoprostol 25 MICROGram(s) Buccal once  misoprostol 25 MICROGram(s) Buccal every 3 hours  oxytocin Infusion 333.333 milliUNIT(s)/Min IV Continuous <Continuous>    PRN Inpatient Medications      REVIEW OF SYSTEMS  --------------------------------------------------------------------------------      All other systems were reviewed and are negative, except as noted.    VITALS/PHYSICAL EXAM  --------------------------------------------------------------------------------  T(C): 36.9 (01-05-23 @ 14:22), Max: 36.9 (01-05-23 @ 14:22)  HR: 88 (01-05-23 @ 15:31) (78 - 100)  BP: 129/94 (01-05-23 @ 15:04) (129/92 - 139/96)  RR: 18 (01-05-23 @ 14:22) (18 - 18)  SpO2: 100% (01-05-23 @ 15:31) (99% - 100%)  Wt(kg): --  Height (cm): 157.5 (01-05-23 @ 14:22)  Weight (kg): 74.8 (01-05-23 @ 14:22)  BMI (kg/m2): 30.2 (01-05-23 @ 14:22)  BSA (m2): 1.76 (01-05-23 @ 14:22)      Physical Exam:  	Gen: NAD  	HEENT: Anicteric, MMM, no JVD  	Pulm: CTA B/L  	CV: S1S2, no rub  	Abd: Soft, +BS, +gravid uterus  	Ext: trace LE edema B/L  	Neuro: Awake, alert  	Skin: Warm and dry  	Vascular access:    LABS/STUDIES  --------------------------------------------------------------------------------                Creatinine Trend:             [facility] DIVISION OF KIDNEY DISEASES AND HYPERTENSION -- [phone]  -- INITIAL CONSULT NOTE  --------------------------------------------------------------------------------  HPI: 32 year old female with h/o lupus nephritis class III, CKD3, chronic HTN now 35 weeks gestation with recent increase in BP, proteinuria & SCr sent in for induction due to concern for chronic HTN with superimposed severe PEC. Baseline creatinine was 1.4 during this pregnancy but recently michelle to 1.67 (1/4/23). UP/C previously 0.2-0.3 (10/19/22) has been increasing to 0.6 & now most recent 0.8 (1/4/23). Seen at Nephrology clinic with Dr. Wilson yesterday when she complained of BP elevation since the past two weeks. Since her last visit she reports her BP is uptrending. SBP in 130's the week prior & this week noted to be in 140's with complains of dry mouth, excessive fatigue & HA. Patient denies any blurred vision, double vision, hand swelling, leg swelling, or RUQ pain, burning or pain with urination. Admits to frothy urine. Compliant with all medications for SLE- Imuran 100mg daily and Plaquenil 200 daily. On Labetalol 100mg qD and amlodipine 10mg qd for HTN. No new meds added recently besides famotidine for GERD        Patient seen & examined. Complains of headache & fatigue. Denies chest pain, fever, chills, dysuria, hematuria, pus in urine, frothy urine, SOB, leg edema, N/V, blurry vision, RUQ pain      PAST HISTORY  --------------------------------------------------------------------------------  PAST MEDICAL & SURGICAL HISTORY:  Lupus nephritis      [place] teeth removed        FAMILY HISTORY:    PAST SOCIAL HISTORY:    ALLERGIES & MEDICATIONS  --------------------------------------------------------------------------------  Allergies    No Known Allergies    Intolerances      Standing Inpatient Medications  chlorhexidine 2% Cloths 1 Application(s) Topical once  citric acid/sodium citrate Solution 15 milliLiter(s) Oral every 6 hours  lactated ringers. 1000 milliLiter(s) IV Continuous <Continuous>  misoprostol 25 MICROGram(s) Buccal once  misoprostol 25 MICROGram(s) Buccal every 3 hours  oxytocin Infusion 333.333 milliUNIT(s)/Min IV Continuous <Continuous>    PRN Inpatient Medications      REVIEW OF SYSTEMS  --------------------------------------------------------------------------------      All other systems were reviewed and are negative, except as noted.    VITALS/PHYSICAL EXAM  --------------------------------------------------------------------------------  T(C): 36.9 (01-05-23 @ 14:22), Max: 36.9 (01-05-23 @ 14:22)  HR: 88 (01-05-23 @ 15:31) (78 - 100)  BP: 129/94 (01-05-23 @ 15:04) (129/92 - 139/96)  RR: 18 (01-05-23 @ 14:22) (18 - 18)  SpO2: 100% (01-05-23 @ 15:31) (99% - 100%)  Wt(kg): --  Height (cm): 157.5 (01-05-23 @ 14:22)  Weight (kg): 74.8 (01-05-23 @ 14:22)  BMI (kg/m2): 30.2 (01-05-23 @ 14:22)  BSA (m2): 1.76 (01-05-23 @ 14:22)      Physical Exam:  	Gen: NAD  	HEENT: Anicteric, MMM, no JVD  	Pulm: CTA B/L  	CV: S1S2, no rub  	Abd: Soft, +BS, +gravid uterus  	Ext: trace LE edema B/L  	Neuro: Awake, alert  	Skin: Warm and dry  	Vascular access:    LABS/STUDIES  --------------------------------------------------------------------------------                Creatinine Trend:

## 2023-01-05 NOTE — OB PROVIDER H&P - ASSESSMENT
31yo F  h/o lupus nephritis @35+4wga IOL for siPEC    Plan  - Admit to L&D. Routine Labs. IVF.  - IOL w/ buccal cytotec.  - siPEC: HELLP q6hrs, c/w home BP meds, monitor BP  - Lupus nephritis: Mg loading dose @1g, no maintenance. Mg serum q6hrs and re-bolus for Mg <4  - Fetus: cat 1 tracing. VTX. EFW extrapolated to 2491g by sono. Continuous EFM. No concerns.  - Fetus: no issues  - GBS unknown - treat with ampicillin for premature status  - Pain: epidural PRN    Patient discussed with attending physician, Dr. Suazo.    APMELA Higuera, PGY2 33yo F  h/o lupus nephritis @35+4wga IOL for siPEC    Plan  - Admit to L&D. Routine Labs. IVF.  - IOL w/ buccal cytotec. Cervical balloon once able.  - siPEC: HELLP q6hrs, c/w home BP meds, monitor BP  - Lupus nephritis: Mg loading dose @1g, no maintenance. Mg serum q6hrs and re-bolus for Mg <4  - Fetus: cat 1 tracing. VTX. EFW extrapolated to 2491g by sono. Continuous EFM. No concerns.  - Fetus: no issues  - GBS unknown - treat with ampicillin for premature status  - Pain: epidural PRN    Patient discussed with attending physician, Dr. Suazo.    PAMELA Higuera, PGY2

## 2023-01-05 NOTE — CONSULT NOTE ADULT - PROBLEM SELECTOR RECOMMENDATION 9
Agree with induction   IVF  Last hapto low & LDH high but Hg rising as outpatient. ?low grade hemolysis from chronic SLE. Compliments not low so less likely lupus flair.  Platelet trend okay & LFTs wnl so unlikely HELLP at this time  Monitor HELLP & PEC labs q6hrs  c/w Labetalol 100mg qD and amlodipine 10mg qd   monitor BP  Mag Agree with induction   IVF  Last hapto low & LDH high but Hg rising as outpatient. ?low grade hemolysis from chronic SLE. Compliments not low so less likely lupus flair.  Platelet trend okay & LFTs wnl so unlikely HELLP at this time  Monitor HELLP & PEC labs q6hrs  c/w Labetalol 100mg qD and amlodipine 10mg qd   monitor BP  Mag sulfate for PEC. Monitor levels carefully as go up to toxic levels in CKD pts

## 2023-01-05 NOTE — OB PROVIDER H&P - NSLOWPPHRISK_OBGYN_A_OB
No previous uterine incision/Lew Pregnancy/Less than or equal to 4 previous vaginal births/No known bleeding disorder/No history of postpartum hemorrhage/No other PPH risks indicated

## 2023-01-05 NOTE — OB PROVIDER H&P - HISTORY OF PRESENT ILLNESS
HPI: 31yo F  @35+4 presents for induction of labor for severe pre-eclampsia by creatinine criteria in setting of lupus nephritis. +FM. -LOF. -CTXs. -VB. Pt denies any other concerns. Patient reports mild headache which she attributes to dehydration, does not want Tylenol at this time.    Baseline creatinine was 1.4 during this pregnancy but recently michelle to 1.67 (23). Complete levels normal (23) - C3: 151, C4: 32  P/C previously 0.2 (10/19/22) has been increasing and is now 0.8 (23)  Patient diagnosed with lupus nephritis in 2020 and sees nephrologist Dr. Kendrick. She has pre-pregnancy HTN secondary to her kidney disease.    GBS unknown  EFW 1891g by 22 an.    OBHx: primigravida  GynHx: denies hx STIs, fibroids, polyps, cysts  PMH: Lupus nephritis, HTN, denies hx clotting or bleeding disorders, HTN, DM  PSH: denies  PFH: no hx congenital disorders, bleeding/clotting disorders  Psych: denies   Social: denies etoh, smoking, drugs. Safe at home/in relationship.  Meds: Amlodipine 10mg qd, Labetalol 100mg qd, Hydroxychloroquine 200mg qd, azathioprine 100mg qd,  mg, Pantoprazole 20mg, Famotidine 20mg, PNV   Allergies: NKDA  Will accept blood transfusions? Yes HPI: 33yo F  @35+4 presents for induction of labor for severe pre-eclampsia by creatinine criteria in setting of lupus nephritis. +FM. -LOF. -CTXs. -VB. Pt denies any other concerns. Patient reports mild headache which she attributes to dehydration, does not want Tylenol at this time.    Baseline creatinine was 1.4 during this pregnancy but recently michelle to 1.67 (23). Complete levels normal (23) - C3: 151, C4: 32  P/C previously 0.2 (10/19/22) has been increasing and is now 0.8 (23)  Patient diagnosed with lupus nephritis in 2020 and sees nephrologist Dr. Kendrick. She has pre-pregnancy HTN secondary to her kidney disease.    GBS unknown  EFW 1891g by 22 an.    OBHx: primigravida  GynHx: denies hx STIs, fibroids, polyps, cysts  PMH: Lupus nephritis, HTN, GERD. Denies hx clotting or bleeding disorders, HTN, DM  PSH: denies  PFH: no hx congenital disorders, bleeding/clotting disorders  Psych: denies   Social: denies etoh, smoking, drugs. Safe at home/in relationship.  Meds: Amlodipine 10mg qd, Labetalol 100mg qd, Hydroxychloroquine 200mg qd, azathioprine 100mg qd,  mg, Pantoprazole 20mg, Famotidine 20mg, PNV   Allergies: NKDA  Will accept blood transfusions? Yes HPI: 31yo F  @35+4 presents for induction of labor for severe pre-eclampsia by creatinine criteria in setting of lupus nephritis. Patient reports BP have been increasing the past few weeks from 120/60s to 140/90s. +FM. -LOF. -CTXs. -VB. Pt denies any other concerns. Patient reports mild headache which she attributes to dehydration, does not want Tylenol at this time.    Baseline creatinine was 1.4 during this pregnancy but recently michelle to 1.67 (23). Complete levels normal (23) - C3: 151, C4: 32  P/C previously 0.2 (10/19/22) has been increasing and is now 0.8 (23)  Patient diagnosed with lupus nephritis in 2020 and sees nephrologist Dr. Kendrick. She has pre-pregnancy HTN secondary to her kidney disease.    GBS unknown  EFW 1891g by 22 an.    OBHx: primigravida  GynHx: denies hx STIs, fibroids, polyps, cysts  PMH: Lupus nephritis, HTN, GERD. Denies hx clotting or bleeding disorders, HTN, DM  PSH: denies  PFH: no hx congenital disorders, bleeding/clotting disorders  Psych: denies   Social: denies etoh, smoking, drugs. Safe at home/in relationship.  Meds: Amlodipine 10mg qd, Labetalol 100mg qd, Hydroxychloroquine 200mg qd, azathioprine 100mg qd,  mg, Pantoprazole 20mg, Famotidine 20mg, PNV   Allergies: NKDA  Will accept blood transfusions? Yes

## 2023-01-05 NOTE — CONSULT NOTE ADULT - ASSESSMENT
32 year old female with h/o lupus nephritis class III, CKD3, chronic HTN now 35 weeks gestation with recent increase in BP, proteinuria & SCr sent in for induction due to concern for chronic HTN with superimposed severe PEC.

## 2023-01-05 NOTE — OB PROVIDER H&P - NSHPPHYSICALEXAM_GEN_ALL_CORE
T(C): 36.9 (01-05-23 @ 14:22), Max: 36.9 (01-05-23 @ 14:22)  HR: 90 (01-05-23 @ 15:21) (78 - 100)  BP: 129/94 (01-05-23 @ 15:04) (129/92 - 139/96)  RR: 18 (01-05-23 @ 14:22) (18 - 18)  SpO2: 100% (01-05-23 @ 15:21) (99% - 100%)    Gen: NAD  CV: RRR  Pulm: breathing comfortably on RA  Abd: gravid, nontender  Extr: moving all extremities with ease  – VE: 0/0/-3  – FHT Cat I: baseline 135, mod variability, +accels, -decels  – Harding: absent  – Sono: vertex, posterior placenta

## 2023-01-05 NOTE — OB RN PATIENT PROFILE - FALL HARM RISK - UNIVERSAL INTERVENTIONS
Bed in lowest position, wheels locked, appropriate side rails in place/Call bell, personal items and telephone in reach/Instruct patient to call for assistance before getting out of bed or chair/Non-slip footwear when patient is out of bed/[place] to call system/Physically safe environment - no spills, clutter or unnecessary equipment/Purposeful Proactive Rounding/Room/bathroom lighting operational, light cord in reach

## 2023-01-05 NOTE — OB RN PATIENT PROFILE - FUNCTIONAL ASSESSMENT - BASIC MOBILITY 6.
4-calculated by average/Not able to assess (calculate score using [facility] averaging method)

## 2023-01-06 LAB
ALBUMIN SERPL ELPH-MCNC: 3.1 G/DL — LOW (ref 3.3–5)
ALBUMIN SERPL ELPH-MCNC: 3.1 G/DL — LOW (ref 3.3–5)
ALBUMIN SERPL ELPH-MCNC: 3.2 G/DL — LOW (ref 3.3–5)
ALP SERPL-CCNC: 161 U/L — HIGH (ref 40–120)
ALP SERPL-CCNC: 164 U/L — HIGH (ref 40–120)
ALP SERPL-CCNC: 171 U/L — HIGH (ref 40–120)
ALT FLD-CCNC: 8 U/L — LOW (ref 10–45)
ALT FLD-CCNC: 9 U/L — LOW (ref 10–45)
ALT FLD-CCNC: 9 U/L — LOW (ref 10–45)
ANION GAP SERPL CALC-SCNC: 13 MMOL/L — SIGNIFICANT CHANGE UP (ref 5–17)
APTT BLD: 28.2 SEC — SIGNIFICANT CHANGE UP (ref 27.5–35.5)
AST SERPL-CCNC: 17 U/L — SIGNIFICANT CHANGE UP (ref 10–40)
AST SERPL-CCNC: 19 U/L — SIGNIFICANT CHANGE UP (ref 10–40)
AST SERPL-CCNC: 20 U/L — SIGNIFICANT CHANGE UP (ref 10–40)
BASOPHILS # BLD AUTO: 0.02 K/UL — SIGNIFICANT CHANGE UP (ref 0–0.2)
BASOPHILS # BLD AUTO: 0.03 K/UL — SIGNIFICANT CHANGE UP (ref 0–0.2)
BASOPHILS # BLD AUTO: 0.03 K/UL — SIGNIFICANT CHANGE UP (ref 0–0.2)
BASOPHILS NFR BLD AUTO: 0.3 % — SIGNIFICANT CHANGE UP (ref 0–2)
BASOPHILS NFR BLD AUTO: 0.4 % — SIGNIFICANT CHANGE UP (ref 0–2)
BASOPHILS NFR BLD AUTO: 0.4 % — SIGNIFICANT CHANGE UP (ref 0–2)
BILIRUB SERPL-MCNC: 0.3 MG/DL — SIGNIFICANT CHANGE UP (ref 0.2–1.2)
BILIRUB SERPL-MCNC: 0.3 MG/DL — SIGNIFICANT CHANGE UP (ref 0.2–1.2)
BILIRUB SERPL-MCNC: 0.4 MG/DL — SIGNIFICANT CHANGE UP (ref 0.2–1.2)
BUN SERPL-MCNC: 19 MG/DL — SIGNIFICANT CHANGE UP (ref 7–23)
BUN SERPL-MCNC: 19 MG/DL — SIGNIFICANT CHANGE UP (ref 7–23)
BUN SERPL-MCNC: 20 MG/DL — SIGNIFICANT CHANGE UP (ref 7–23)
CALCIUM SERPL-MCNC: 9.1 MG/DL — SIGNIFICANT CHANGE UP (ref 8.4–10.5)
CALCIUM SERPL-MCNC: 9.1 MG/DL — SIGNIFICANT CHANGE UP (ref 8.4–10.5)
CALCIUM SERPL-MCNC: 9.2 MG/DL — SIGNIFICANT CHANGE UP (ref 8.4–10.5)
CHLORIDE SERPL-SCNC: 104 MMOL/L — SIGNIFICANT CHANGE UP (ref 96–108)
CHLORIDE SERPL-SCNC: 105 MMOL/L — SIGNIFICANT CHANGE UP (ref 96–108)
CHLORIDE SERPL-SCNC: 106 MMOL/L — SIGNIFICANT CHANGE UP (ref 96–108)
CO2 SERPL-SCNC: 18 MMOL/L — LOW (ref 22–31)
CO2 SERPL-SCNC: 18 MMOL/L — LOW (ref 22–31)
CO2 SERPL-SCNC: 19 MMOL/L — LOW (ref 22–31)
COVID-19 SPIKE DOMAIN AB INTERP: POSITIVE
COVID-19 SPIKE DOMAIN ANTIBODY RESULT: >250 U/ML — HIGH
CREAT SERPL-MCNC: 1.58 MG/DL — HIGH (ref 0.5–1.3)
CREAT SERPL-MCNC: 1.59 MG/DL — HIGH (ref 0.5–1.3)
CREAT SERPL-MCNC: 1.64 MG/DL — HIGH (ref 0.5–1.3)
EGFR: 43 ML/MIN/1.73M2 — LOW
EGFR: 45 ML/MIN/1.73M2 — LOW
EGFR: 45 ML/MIN/1.73M2 — LOW
EOSINOPHIL # BLD AUTO: 0.11 K/UL — SIGNIFICANT CHANGE UP (ref 0–0.5)
EOSINOPHIL # BLD AUTO: 0.12 K/UL — SIGNIFICANT CHANGE UP (ref 0–0.5)
EOSINOPHIL # BLD AUTO: 0.17 K/UL — SIGNIFICANT CHANGE UP (ref 0–0.5)
EOSINOPHIL NFR BLD AUTO: 1.4 % — SIGNIFICANT CHANGE UP (ref 0–6)
EOSINOPHIL NFR BLD AUTO: 1.6 % — SIGNIFICANT CHANGE UP (ref 0–6)
EOSINOPHIL NFR BLD AUTO: 2.2 % — SIGNIFICANT CHANGE UP (ref 0–6)
FIBRINOGEN PPP-MCNC: 562 MG/DL — HIGH (ref 200–445)
FIBRINOGEN PPP-MCNC: 570 MG/DL — HIGH (ref 200–445)
GLUCOSE SERPL-MCNC: 78 MG/DL — SIGNIFICANT CHANGE UP (ref 70–99)
GLUCOSE SERPL-MCNC: 79 MG/DL — SIGNIFICANT CHANGE UP (ref 70–99)
GLUCOSE SERPL-MCNC: 92 MG/DL — SIGNIFICANT CHANGE UP (ref 70–99)
HCT VFR BLD CALC: 26.3 % — LOW (ref 34.5–45)
HCT VFR BLD CALC: 28.9 % — LOW (ref 34.5–45)
HCT VFR BLD CALC: 29.1 % — LOW (ref 34.5–45)
HGB BLD-MCNC: 10.1 G/DL — LOW (ref 11.5–15.5)
HGB BLD-MCNC: 9 G/DL — LOW (ref 11.5–15.5)
HGB BLD-MCNC: 9.8 G/DL — LOW (ref 11.5–15.5)
IMM GRANULOCYTES NFR BLD AUTO: 0.8 % — SIGNIFICANT CHANGE UP (ref 0–0.9)
IMM GRANULOCYTES NFR BLD AUTO: 1.1 % — HIGH (ref 0–0.9)
IMM GRANULOCYTES NFR BLD AUTO: 1.2 % — HIGH (ref 0–0.9)
INR BLD: 0.9 RATIO — SIGNIFICANT CHANGE UP (ref 0.88–1.16)
LDH SERPL L TO P-CCNC: 247 U/L — HIGH (ref 50–242)
LDH SERPL L TO P-CCNC: 251 U/L — HIGH (ref 50–242)
LYMPHOCYTES # BLD AUTO: 0.91 K/UL — LOW (ref 1–3.3)
LYMPHOCYTES # BLD AUTO: 1.08 K/UL — SIGNIFICANT CHANGE UP (ref 1–3.3)
LYMPHOCYTES # BLD AUTO: 1.28 K/UL — SIGNIFICANT CHANGE UP (ref 1–3.3)
LYMPHOCYTES # BLD AUTO: 11.6 % — LOW (ref 13–44)
LYMPHOCYTES # BLD AUTO: 14.4 % — SIGNIFICANT CHANGE UP (ref 13–44)
LYMPHOCYTES # BLD AUTO: 16.5 % — SIGNIFICANT CHANGE UP (ref 13–44)
MAGNESIUM SERPL-MCNC: 2.4 MG/DL — SIGNIFICANT CHANGE UP (ref 1.6–2.6)
MCHC RBC-ENTMCNC: 32.8 PG — SIGNIFICANT CHANGE UP (ref 27–34)
MCHC RBC-ENTMCNC: 33.3 PG — SIGNIFICANT CHANGE UP (ref 27–34)
MCHC RBC-ENTMCNC: 33.7 GM/DL — SIGNIFICANT CHANGE UP (ref 32–36)
MCHC RBC-ENTMCNC: 33.8 PG — SIGNIFICANT CHANGE UP (ref 27–34)
MCHC RBC-ENTMCNC: 34.2 GM/DL — SIGNIFICANT CHANGE UP (ref 32–36)
MCHC RBC-ENTMCNC: 34.9 GM/DL — SIGNIFICANT CHANGE UP (ref 32–36)
MCV RBC AUTO: 96.7 FL — SIGNIFICANT CHANGE UP (ref 80–100)
MCV RBC AUTO: 97.3 FL — SIGNIFICANT CHANGE UP (ref 80–100)
MCV RBC AUTO: 97.4 FL — SIGNIFICANT CHANGE UP (ref 80–100)
MONOCYTES # BLD AUTO: 0.34 K/UL — SIGNIFICANT CHANGE UP (ref 0–0.9)
MONOCYTES # BLD AUTO: 0.42 K/UL — SIGNIFICANT CHANGE UP (ref 0–0.9)
MONOCYTES # BLD AUTO: 0.44 K/UL — SIGNIFICANT CHANGE UP (ref 0–0.9)
MONOCYTES NFR BLD AUTO: 4.5 % — SIGNIFICANT CHANGE UP (ref 2–14)
MONOCYTES NFR BLD AUTO: 5.4 % — SIGNIFICANT CHANGE UP (ref 2–14)
MONOCYTES NFR BLD AUTO: 5.7 % — SIGNIFICANT CHANGE UP (ref 2–14)
NEUTROPHILS # BLD AUTO: 5.73 K/UL — SIGNIFICANT CHANGE UP (ref 1.8–7.4)
NEUTROPHILS # BLD AUTO: 5.85 K/UL — SIGNIFICANT CHANGE UP (ref 1.8–7.4)
NEUTROPHILS # BLD AUTO: 6.3 K/UL — SIGNIFICANT CHANGE UP (ref 1.8–7.4)
NEUTROPHILS NFR BLD AUTO: 74 % — SIGNIFICANT CHANGE UP (ref 43–77)
NEUTROPHILS NFR BLD AUTO: 78.3 % — HIGH (ref 43–77)
NEUTROPHILS NFR BLD AUTO: 80.2 % — HIGH (ref 43–77)
NRBC # BLD: 0 /100 WBCS — SIGNIFICANT CHANGE UP (ref 0–0)
PLATELET # BLD AUTO: 147 K/UL — LOW (ref 150–400)
PLATELET # BLD AUTO: 154 K/UL — SIGNIFICANT CHANGE UP (ref 150–400)
PLATELET # BLD AUTO: 172 K/UL — SIGNIFICANT CHANGE UP (ref 150–400)
POTASSIUM SERPL-MCNC: 4.3 MMOL/L — SIGNIFICANT CHANGE UP (ref 3.5–5.3)
POTASSIUM SERPL-MCNC: 4.5 MMOL/L — SIGNIFICANT CHANGE UP (ref 3.5–5.3)
POTASSIUM SERPL-MCNC: 4.7 MMOL/L — SIGNIFICANT CHANGE UP (ref 3.5–5.3)
POTASSIUM SERPL-SCNC: 4.3 MMOL/L — SIGNIFICANT CHANGE UP (ref 3.5–5.3)
POTASSIUM SERPL-SCNC: 4.5 MMOL/L — SIGNIFICANT CHANGE UP (ref 3.5–5.3)
POTASSIUM SERPL-SCNC: 4.7 MMOL/L — SIGNIFICANT CHANGE UP (ref 3.5–5.3)
PROT SERPL-MCNC: 6.3 G/DL — SIGNIFICANT CHANGE UP (ref 6–8.3)
PROT SERPL-MCNC: 6.4 G/DL — SIGNIFICANT CHANGE UP (ref 6–8.3)
PROT SERPL-MCNC: 6.5 G/DL — SIGNIFICANT CHANGE UP (ref 6–8.3)
PROTHROM AB SERPL-ACNC: 10.4 SEC — LOW (ref 10.5–13.4)
RBC # BLD: 2.7 M/UL — LOW (ref 3.8–5.2)
RBC # BLD: 2.99 M/UL — LOW (ref 3.8–5.2)
RBC # BLD: 2.99 M/UL — LOW (ref 3.8–5.2)
RBC # FLD: 14.2 % — SIGNIFICANT CHANGE UP (ref 10.3–14.5)
RBC # FLD: 14.6 % — HIGH (ref 10.3–14.5)
RBC # FLD: 14.6 % — HIGH (ref 10.3–14.5)
RUBV IGG SER-ACNC: 1.8 INDEX — SIGNIFICANT CHANGE UP
RUBV IGG SER-IMP: POSITIVE — SIGNIFICANT CHANGE UP
SARS-COV-2 IGG+IGM SERPL QL IA: >250 U/ML — HIGH
SARS-COV-2 IGG+IGM SERPL QL IA: POSITIVE
SODIUM SERPL-SCNC: 136 MMOL/L — SIGNIFICANT CHANGE UP (ref 135–145)
SODIUM SERPL-SCNC: 136 MMOL/L — SIGNIFICANT CHANGE UP (ref 135–145)
SODIUM SERPL-SCNC: 137 MMOL/L — SIGNIFICANT CHANGE UP (ref 135–145)
T PALLIDUM AB TITR SER: NEGATIVE — SIGNIFICANT CHANGE UP
URATE SERPL-MCNC: 9.6 MG/DL — HIGH (ref 2.5–7)
WBC # BLD: 7.48 K/UL — SIGNIFICANT CHANGE UP (ref 3.8–10.5)
WBC # BLD: 7.74 K/UL — SIGNIFICANT CHANGE UP (ref 3.8–10.5)
WBC # BLD: 7.85 K/UL — SIGNIFICANT CHANGE UP (ref 3.8–10.5)
WBC # FLD AUTO: 7.48 K/UL — SIGNIFICANT CHANGE UP (ref 3.8–10.5)
WBC # FLD AUTO: 7.74 K/UL — SIGNIFICANT CHANGE UP (ref 3.8–10.5)
WBC # FLD AUTO: 7.85 K/UL — SIGNIFICANT CHANGE UP (ref 3.8–10.5)

## 2023-01-06 PROCEDURE — 99233 SBSQ HOSP IP/OBS HIGH 50: CPT | Mod: GC

## 2023-01-06 RX ORDER — SODIUM CHLORIDE 9 MG/ML
1000 INJECTION, SOLUTION INTRAVENOUS
Refills: 0 | Status: DISCONTINUED | OUTPATIENT
Start: 2023-01-06 | End: 2023-01-07

## 2023-01-06 RX ORDER — HYDROXYCHLOROQUINE SULFATE 200 MG
200 TABLET ORAL DAILY
Refills: 0 | Status: DISCONTINUED | OUTPATIENT
Start: 2023-01-06 | End: 2023-01-06

## 2023-01-06 RX ORDER — MAGNESIUM SULFATE 500 MG/ML
1 VIAL (ML) INJECTION ONCE
Refills: 0 | Status: COMPLETED | OUTPATIENT
Start: 2023-01-06 | End: 2023-01-06

## 2023-01-06 RX ORDER — MAGNESIUM SULFATE 500 MG/ML
1 VIAL (ML) INJECTION
Qty: 40 | Refills: 0 | Status: DISCONTINUED | OUTPATIENT
Start: 2023-01-06 | End: 2023-01-07

## 2023-01-06 RX ORDER — OXYTOCIN 10 UNIT/ML
2 VIAL (ML) INJECTION
Qty: 30 | Refills: 0 | Status: DISCONTINUED | OUTPATIENT
Start: 2023-01-06 | End: 2023-01-10

## 2023-01-06 RX ADMIN — PANTOPRAZOLE SODIUM 20 MILLIGRAM(S): 20 TABLET, DELAYED RELEASE ORAL at 22:29

## 2023-01-06 RX ADMIN — Medication 108 GRAM(S): at 10:19

## 2023-01-06 RX ADMIN — Medication 108 GRAM(S): at 22:18

## 2023-01-06 RX ADMIN — Medication 25 GM/HR: at 13:04

## 2023-01-06 RX ADMIN — AZATHIOPRINE 100 MILLIGRAM(S): 100 TABLET ORAL at 20:43

## 2023-01-06 RX ADMIN — Medication 108 GRAM(S): at 06:20

## 2023-01-06 RX ADMIN — Medication 2 MILLIUNIT(S)/MIN: at 13:48

## 2023-01-06 RX ADMIN — Medication 108 GRAM(S): at 18:38

## 2023-01-06 RX ADMIN — AMLODIPINE BESYLATE 10 MILLIGRAM(S): 2.5 TABLET ORAL at 22:17

## 2023-01-06 RX ADMIN — SODIUM CHLORIDE 125 MILLILITER(S): 9 INJECTION, SOLUTION INTRAVENOUS at 17:09

## 2023-01-06 RX ADMIN — Medication 25 GM/HR: at 19:37

## 2023-01-06 RX ADMIN — Medication 108 GRAM(S): at 02:26

## 2023-01-06 RX ADMIN — SODIUM CHLORIDE 125 MILLILITER(S): 9 INJECTION, SOLUTION INTRAVENOUS at 19:01

## 2023-01-06 RX ADMIN — Medication 100 MILLIGRAM(S): at 22:17

## 2023-01-06 RX ADMIN — SODIUM CHLORIDE 125 MILLILITER(S): 9 INJECTION, SOLUTION INTRAVENOUS at 09:33

## 2023-01-06 RX ADMIN — Medication 200 MILLIGRAM(S): at 20:42

## 2023-01-06 RX ADMIN — Medication 300 GRAM(S): at 00:10

## 2023-01-06 RX ADMIN — Medication 108 GRAM(S): at 14:20

## 2023-01-06 NOTE — OB PROVIDER LABOR PROGRESS NOTE - ASSESSMENT
31yo  @35+5 IOL for siPEC h/o lupus nephritis    - SVE: /-3  - Cervical balloon placed at 1230p  - T Cat 1, reassuring, ctm  - switch to pitocin 4x4  - siPEC, ctm BP. c/w BP medication    d/w Dr. Atif Higuera, PGY2

## 2023-01-06 NOTE — OB PROVIDER LABOR PROGRESS NOTE - ASSESSMENT
32y  35w5d admitted for IOL 2/2 lupus nephritis, met criteria for siPEC.    -Labor: continue pitocin as able, s/p CB  -Fetus: cat 1, reassuring  -GBS: unk on amp  -Pain: epidural in place, effective    D/w  32y  35w5d admitted for IOL 2/2 lupus nephritis, met criteria for siPEC.    -Labor: continue pitocin as able, s/p CB. Did not attempt AROM on exam, fetal head high and ballotable. Will reevaluate on next exam in approx 2h  -Fetus: cat 1, reassuring  -GBS: unk on amp  -Pain: epidural in place, effective    D/w Dr. Atif Mcguire PGY1

## 2023-01-06 NOTE — PROGRESS NOTE ADULT - ASSESSMENT
32 year old female with h/o lupus nephritis class III, CKD3, chronic HTN now 35 weeks gestation with recent increase in BP, proteinuria & SCr sent in for induction due to concern for chronic HTN with superimposed severe PEC and worsening renal function.

## 2023-01-06 NOTE — PROGRESS NOTE ADULT - ATTENDING COMMENTS
Sent for worsening renal fxn, possible superimposed PEC; lupus  #CKD3- with recent rise in cr to 1.6  monitor cr trend  #lupus//lupus nephritis  cont imuran  #pec proph  on mg  check levels  PEC /HELLPS labs stable  #HTN- labetalol and amlodipine

## 2023-01-06 NOTE — PRE-ANESTHESIA EVALUATION ADULT - NSANTHADDINFOFT_GEN_ALL_CORE
- Continue with levothyroxine 150 mcg qd, to be given in on empty stomach, wait at least half an hr before eating or taking other meds and should be 4 hrs apart from calcium or MVI (Noted to be given along with Ca today at 6 am, spoke to RN and reinforced to be given LT4 at 5 am and Ca with meals)  - Check TFTs as outpatient in 4 weeks   - Goal TSH <0.1 based on path result All r/b/a discussed with patient and partner at bedside, all questions answered

## 2023-01-06 NOTE — PROGRESS NOTE ADULT - SUBJECTIVE AND OBJECTIVE BOX
[facility] DIVISION OF KIDNEY DISEASES AND HYPERTENSION -- FOLLOW UP NOTE  --------------------------------------------------------------------------------  Chief Complaint:/subjective: feels cold, no major complaints, no blurred vision, double vision, HA    24 hour events: being induced; received Mg x2        PAST HISTORY  --------------------------------------------------------------------------------  No significant changes to PMH, PSH, FHx, SHx, unless otherwise noted    ALLERGIES & MEDICATIONS  --------------------------------------------------------------------------------  Allergies    No Known Allergies    Intolerances      Standing Inpatient Medications  amLODIPine   Tablet 10 milliGRAM(s) Oral daily  ampicillin  IVPB 1 Gram(s) IV Intermittent every 4 hours  azaTHIOprine 100 milliGRAM(s) Oral daily  chlorhexidine 2% Cloths 1 Application(s) Topical once  citric acid/sodium citrate Solution 15 milliLiter(s) Oral every 6 hours  famotidine    Tablet 20 milliGRAM(s) Oral daily  hydroxychloroquine 200 milliGRAM(s) Oral daily  labetalol 100 milliGRAM(s) Oral daily  lactated ringers. 1000 milliLiter(s) IV Continuous <Continuous>  misoprostol 25 MICROGram(s) Buccal every 3 hours  oxytocin Infusion 333.333 milliUNIT(s)/Min IV Continuous <Continuous>  pantoprazole    Tablet 20 milliGRAM(s) Oral before breakfast    PRN Inpatient Medications      REVIEW OF SYSTEMS  --------------------------------------------------------------------------------  Gen: No weight changes, fatigue, fevers/chills, weakness  Skin: No rashes  Head/Eyes/Ears/Mouth: No headache;   Respiratory: No dyspnea, cough  CV: No chest pain, PND, orthopnea  GI: No abdominal pain, diarrhea, constipation, nausea, vomiting  : No increased frequency, dysuria, hematuria, nocturia  MSK: No joint pain/swelling; no back pain; no edema  Neuro: No dizziness/lightheadedness, weakness  Heme: No easy bruising or bleeding  Psych: No significant nervousness, anxiety, stress, depression    All other systems were reviewed and are negative, except as noted.    VITALS/PHYSICAL EXAM  --------------------------------------------------------------------------------  T(C): 37.0 (01-06-23 @ 10:05), Max: 37.2 (01-06-23 @ 06:54)  HR: 71 (01-06-23 @ 11:20) (63 - 101)  BP: 129/91 (01-06-23 @ 11:12) (119/91 - 148/98)  RR: 17 (01-06-23 @ 10:05) (16 - 18)  SpO2: 99% (01-06-23 @ 11:20) (96% - 100%)  Wt(kg): --  Adult Advanced Hemodynamics Last 24 Hrs  ABP: --  ABP(mean): --  CVP(mm Hg): --  CO: --  CI: --  PA: --  PA(mean): --  PCWP: --  SVR: --  SVRI: --  Height (cm): 157.5 (01-06-23 @ 09:34)  Weight (kg): 74.8 (01-06-23 @ 09:34)  BMI (kg/m2): 30.2 (01-06-23 @ 09:34)  BSA (m2): 1.76 (01-06-23 @ 09:34)      01-05-23 @ 07:01  -  01-06-23 @ 07:00  --------------------------------------------------------  IN: 100 mL / OUT: 2000 mL / NET: -1900 mL    01-06-23 @ 07:01  -  01-06-23 @ 11:22  --------------------------------------------------------  IN: 625 mL / OUT: 0 mL / NET: 625 mL      Physical Exam:  	Gen: NAD,    	HEENT:   no jvp  	Pulm: CTA B/L  	CV: RRR, S1S2; no rub  	Back:  no sacral edema  	Abd: +BS, soft,    	: No suprapubic tenderness  	Ext: no edema  	Neuro: awake  	Psych:alert  	Skin: Warm,    	Vascular access:    LABS/STUDIES  --------------------------------------------------------------------------------              9.8    7.74  >-----------<  154      [01-06-23 @ 05:51]              29.1     Hemoglobin: 9.8 g/dL (01-06-23 @ 05:51)  Hemoglobin: 9.6 g/dL (01-05-23 @ 22:50)    Platelet Count - Automated: 154 K/uL (01-06-23 @ 05:51)  Platelet Count - Automated: 153 K/uL (01-05-23 @ 22:50)    137  |  106  |  20  ----------------------------<  79      [01-06-23 @ 05:51]  4.7   |  18  |  1.64        Ca     9.2     [01-06-23 @ 05:51]      Mg     2.4     [01-06-23 @ 05:51]    TPro  6.3  /  Alb  3.2  /  TBili  0.3  /  DBili  x   /  AST  20  /  ALT  9   /  AlkPhos  161  [01-06-23 @ 05:51]    PT/INR: PT 10.4 , INR 0.90       [01-06-23 @ 05:51]  PTT: 28.2       [01-06-23 @ 05:51]    Uric acid 9.6      [01-06-23 @ 05:51]        [01-06-23 @ 05:51]    Creatinine Trend:  SCr 1.64 [01-06 @ 05:51]  SCr 1.60 [01-05 @ 22:50]  SCr 1.68 [01-05 @ 16:54]    Urinalysis - [01-05-23 @ 16:11]      Color Light Yellow / Appearance Clear / SG 1.008 / pH 6.5      Gluc Negative / Ketone Negative  / Bili Negative / Urobili Negative       Blood Trace / Protein 100 mg/dl / Leuk Est Negative / Nitrite Negative      RBC 1 / WBC 1 / Hyaline  / Gran  / Sq Epi  / Non Sq Epi 1 / Bacteria Negative    Urine Creatinine 55      [01-05-23 @ 16:34]  Urine Protein 68      [01-05-23 @ 16:34]        Syphilis Screen (Treponema Pallidum Ab) Negative      [01-05-23 @ 16:11]

## 2023-01-06 NOTE — PRE-ANESTHESIA EVALUATION ADULT - NSANTHPEFT_GEN_ALL_CORE
GENERAL: NAD  HEAD:  Atraumatic, Normocephalic  PSYCH: AAOx3  NEUROLOGY: non-focal  SKIN: No obvious rashes or lesions.

## 2023-01-06 NOTE — PRE-ANESTHESIA EVALUATION ADULT - NSANTHPMHFT_GEN_ALL_CORE
31yo F  @35+4 presents for induction of labor for severe pre-eclampsia by creatinine criteria in setting of lupus nephritis. Patient reports BP have been increasing the past few weeks from 120/60s to 140/90s. +FM. -LOF. -CTXs. -VB. Pt denies any other concerns. Patient reports mild headache which she attributes to dehydration, does not want Tylenol at this time.    Baseline creatinine was 1.4 during this pregnancy but recently michelle to 1.67 (23). Complete levels normal (23) - C3: 151, C4: 32  P/C previously 0.2 (10/19/22) has been increasing and is now 0.8 (23)  Patient diagnosed with lupus nephritis in 2020 and sees nephrologist Dr. Kendrick. She has pre-pregnancy HTN secondary to her kidney disease.  Patient reports no back problems in lumbar spine. Patient reports cervical radiculopathy 33yo F  @35+4 presents for induction of labor for severe pre-eclampsia by creatinine criteria in setting of lupus nephritis

## 2023-01-06 NOTE — OB PROVIDER LABOR PROGRESS NOTE - ASSESSMENT
IOL for lupus nephritis, siPEC/Mg  - Pt requests epidural before placement of CB    Harika Cardenas, PGY1  d/w Dr. Srivastava

## 2023-01-06 NOTE — CHART NOTE - NSCHARTNOTEFT_GEN_A_CORE
Discussed current magnesium regimen with pt's nephrologist Dr. Wilson. As pt's magnesium level is currently subtherapeutic, decision made to start Mg infusion at 1g/hr for siPEC w/ severe features. Will continue to monitor pt's renal function & Mg levels q6hrs. Will pause Mg infusion if pt's creatinine begins to rise.    Vania, PGY3  D/w Dr. Srivastava

## 2023-01-06 NOTE — PRE-ANESTHESIA EVALUATION ADULT - NSANTHOSAYNRD_GEN_A_CORE
No. ARON screening performed.  STOP BANG Legend: 0-2 = LOW Risk; 3-4 = INTERMEDIATE Risk; 5-8 = HIGH Risk

## 2023-01-07 ENCOUNTER — NON-APPOINTMENT (OUTPATIENT)
Age: 33
End: 2023-01-07

## 2023-01-07 LAB
ALBUMIN SERPL ELPH-MCNC: 2.8 G/DL — LOW (ref 3.3–5)
ALBUMIN SERPL ELPH-MCNC: 3.1 G/DL — LOW (ref 3.3–5)
ALP SERPL-CCNC: 149 U/L — HIGH (ref 40–120)
ALP SERPL-CCNC: 175 U/L — HIGH (ref 40–120)
ALT FLD-CCNC: 10 U/L — SIGNIFICANT CHANGE UP (ref 10–45)
ALT FLD-CCNC: 7 U/L — LOW (ref 10–45)
ANION GAP SERPL CALC-SCNC: 13 MMOL/L — SIGNIFICANT CHANGE UP (ref 5–17)
ANION GAP SERPL CALC-SCNC: 14 MMOL/L — SIGNIFICANT CHANGE UP (ref 5–17)
APTT BLD: 26.5 SEC — LOW (ref 27.5–35.5)
APTT BLD: 27.2 SEC — LOW (ref 27.5–35.5)
AST SERPL-CCNC: 21 U/L — SIGNIFICANT CHANGE UP (ref 10–40)
AST SERPL-CCNC: 32 U/L — SIGNIFICANT CHANGE UP (ref 10–40)
BASOPHILS # BLD AUTO: 0.02 K/UL — SIGNIFICANT CHANGE UP (ref 0–0.2)
BASOPHILS # BLD AUTO: 0.02 K/UL — SIGNIFICANT CHANGE UP (ref 0–0.2)
BASOPHILS NFR BLD AUTO: 0.2 % — SIGNIFICANT CHANGE UP (ref 0–2)
BASOPHILS NFR BLD AUTO: 0.2 % — SIGNIFICANT CHANGE UP (ref 0–2)
BILIRUB SERPL-MCNC: 0.4 MG/DL — SIGNIFICANT CHANGE UP (ref 0.2–1.2)
BILIRUB SERPL-MCNC: 0.4 MG/DL — SIGNIFICANT CHANGE UP (ref 0.2–1.2)
BUN SERPL-MCNC: 18 MG/DL — SIGNIFICANT CHANGE UP (ref 7–23)
BUN SERPL-MCNC: 18 MG/DL — SIGNIFICANT CHANGE UP (ref 7–23)
CALCIUM SERPL-MCNC: 8.1 MG/DL — LOW (ref 8.4–10.5)
CALCIUM SERPL-MCNC: 8.7 MG/DL — SIGNIFICANT CHANGE UP (ref 8.4–10.5)
CHLORIDE SERPL-SCNC: 100 MMOL/L — SIGNIFICANT CHANGE UP (ref 96–108)
CHLORIDE SERPL-SCNC: 101 MMOL/L — SIGNIFICANT CHANGE UP (ref 96–108)
CO2 SERPL-SCNC: 18 MMOL/L — LOW (ref 22–31)
CO2 SERPL-SCNC: 19 MMOL/L — LOW (ref 22–31)
CREAT SERPL-MCNC: 1.68 MG/DL — HIGH (ref 0.5–1.3)
CREAT SERPL-MCNC: 1.81 MG/DL — HIGH (ref 0.5–1.3)
EGFR: 38 ML/MIN/1.73M2 — LOW
EGFR: 42 ML/MIN/1.73M2 — LOW
EOSINOPHIL # BLD AUTO: 0.01 K/UL — SIGNIFICANT CHANGE UP (ref 0–0.5)
EOSINOPHIL # BLD AUTO: 0.11 K/UL — SIGNIFICANT CHANGE UP (ref 0–0.5)
EOSINOPHIL NFR BLD AUTO: 0.1 % — SIGNIFICANT CHANGE UP (ref 0–6)
EOSINOPHIL NFR BLD AUTO: 1.2 % — SIGNIFICANT CHANGE UP (ref 0–6)
FIBRINOGEN PPP-MCNC: 591 MG/DL — HIGH (ref 200–445)
FIBRINOGEN PPP-MCNC: 634 MG/DL — HIGH (ref 200–445)
GLUCOSE SERPL-MCNC: 106 MG/DL — HIGH (ref 70–99)
GLUCOSE SERPL-MCNC: 133 MG/DL — HIGH (ref 70–99)
HCT VFR BLD CALC: 25.2 % — LOW (ref 34.5–45)
HCT VFR BLD CALC: 28.8 % — LOW (ref 34.5–45)
HGB BLD-MCNC: 8.7 G/DL — LOW (ref 11.5–15.5)
HGB BLD-MCNC: 9.9 G/DL — LOW (ref 11.5–15.5)
IMM GRANULOCYTES NFR BLD AUTO: 0.5 % — SIGNIFICANT CHANGE UP (ref 0–0.9)
IMM GRANULOCYTES NFR BLD AUTO: 0.5 % — SIGNIFICANT CHANGE UP (ref 0–0.9)
INR BLD: 0.88 RATIO — SIGNIFICANT CHANGE UP (ref 0.88–1.16)
LDH SERPL L TO P-CCNC: 304 U/L — HIGH (ref 50–242)
LDH SERPL L TO P-CCNC: 427 U/L — HIGH (ref 50–242)
LYMPHOCYTES # BLD AUTO: 0.83 K/UL — LOW (ref 1–3.3)
LYMPHOCYTES # BLD AUTO: 0.91 K/UL — LOW (ref 1–3.3)
LYMPHOCYTES # BLD AUTO: 7.1 % — LOW (ref 13–44)
LYMPHOCYTES # BLD AUTO: 9.7 % — LOW (ref 13–44)
MAGNESIUM SERPL-MCNC: 6.8 MG/DL — HIGH (ref 1.6–2.6)
MCHC RBC-ENTMCNC: 33 PG — SIGNIFICANT CHANGE UP (ref 27–34)
MCHC RBC-ENTMCNC: 33.3 PG — SIGNIFICANT CHANGE UP (ref 27–34)
MCHC RBC-ENTMCNC: 34.4 GM/DL — SIGNIFICANT CHANGE UP (ref 32–36)
MCHC RBC-ENTMCNC: 34.5 GM/DL — SIGNIFICANT CHANGE UP (ref 32–36)
MCV RBC AUTO: 96 FL — SIGNIFICANT CHANGE UP (ref 80–100)
MCV RBC AUTO: 96.6 FL — SIGNIFICANT CHANGE UP (ref 80–100)
MONOCYTES # BLD AUTO: 0.44 K/UL — SIGNIFICANT CHANGE UP (ref 0–0.9)
MONOCYTES # BLD AUTO: 0.56 K/UL — SIGNIFICANT CHANGE UP (ref 0–0.9)
MONOCYTES NFR BLD AUTO: 4.7 % — SIGNIFICANT CHANGE UP (ref 2–14)
MONOCYTES NFR BLD AUTO: 4.8 % — SIGNIFICANT CHANGE UP (ref 2–14)
NEUTROPHILS # BLD AUTO: 10.28 K/UL — HIGH (ref 1.8–7.4)
NEUTROPHILS # BLD AUTO: 7.88 K/UL — HIGH (ref 1.8–7.4)
NEUTROPHILS NFR BLD AUTO: 83.7 % — HIGH (ref 43–77)
NEUTROPHILS NFR BLD AUTO: 87.3 % — HIGH (ref 43–77)
NRBC # BLD: 0 /100 WBCS — SIGNIFICANT CHANGE UP (ref 0–0)
NRBC # BLD: 0 /100 WBCS — SIGNIFICANT CHANGE UP (ref 0–0)
PLATELET # BLD AUTO: 138 K/UL — LOW (ref 150–400)
PLATELET # BLD AUTO: 158 K/UL — SIGNIFICANT CHANGE UP (ref 150–400)
POTASSIUM SERPL-MCNC: 4.2 MMOL/L — SIGNIFICANT CHANGE UP (ref 3.5–5.3)
POTASSIUM SERPL-MCNC: 4.6 MMOL/L — SIGNIFICANT CHANGE UP (ref 3.5–5.3)
POTASSIUM SERPL-SCNC: 4.2 MMOL/L — SIGNIFICANT CHANGE UP (ref 3.5–5.3)
POTASSIUM SERPL-SCNC: 4.6 MMOL/L — SIGNIFICANT CHANGE UP (ref 3.5–5.3)
PROT SERPL-MCNC: 5.7 G/DL — LOW (ref 6–8.3)
PROT SERPL-MCNC: 6.6 G/DL — SIGNIFICANT CHANGE UP (ref 6–8.3)
PROTHROM AB SERPL-ACNC: 10.2 SEC — LOW (ref 10.5–13.4)
RBC # BLD: 2.61 M/UL — LOW (ref 3.8–5.2)
RBC # BLD: 3 M/UL — LOW (ref 3.8–5.2)
RBC # FLD: 14.2 % — SIGNIFICANT CHANGE UP (ref 10.3–14.5)
RBC # FLD: 14.3 % — SIGNIFICANT CHANGE UP (ref 10.3–14.5)
SODIUM SERPL-SCNC: 132 MMOL/L — LOW (ref 135–145)
SODIUM SERPL-SCNC: 133 MMOL/L — LOW (ref 135–145)
URATE SERPL-MCNC: 8.7 MG/DL — HIGH (ref 2.5–7)
WBC # BLD: 11.76 K/UL — HIGH (ref 3.8–10.5)
WBC # BLD: 9.41 K/UL — SIGNIFICANT CHANGE UP (ref 3.8–10.5)
WBC # FLD AUTO: 11.76 K/UL — HIGH (ref 3.8–10.5)
WBC # FLD AUTO: 9.41 K/UL — SIGNIFICANT CHANGE UP (ref 3.8–10.5)

## 2023-01-07 PROCEDURE — 99233 SBSQ HOSP IP/OBS HIGH 50: CPT

## 2023-01-07 PROCEDURE — 59410 OBSTETRICAL CARE: CPT

## 2023-01-07 RX ORDER — ACETAMINOPHEN 500 MG
975 TABLET ORAL
Refills: 0 | Status: DISCONTINUED | OUTPATIENT
Start: 2023-01-07 | End: 2023-01-10

## 2023-01-07 RX ORDER — MAGNESIUM SULFATE 500 MG/ML
0.5 VIAL (ML) INJECTION
Qty: 40 | Refills: 0 | Status: DISCONTINUED | OUTPATIENT
Start: 2023-01-07 | End: 2023-01-07

## 2023-01-07 RX ORDER — DIBUCAINE 1 %
1 OINTMENT (GRAM) RECTAL EVERY 6 HOURS
Refills: 0 | Status: DISCONTINUED | OUTPATIENT
Start: 2023-01-07 | End: 2023-01-10

## 2023-01-07 RX ORDER — TETANUS TOXOID, REDUCED DIPHTHERIA TOXOID AND ACELLULAR PERTUSSIS VACCINE, ADSORBED 5; 2.5; 8; 8; 2.5 [IU]/.5ML; [IU]/.5ML; UG/.5ML; UG/.5ML; UG/.5ML
0.5 SUSPENSION INTRAMUSCULAR ONCE
Refills: 0 | Status: DISCONTINUED | OUTPATIENT
Start: 2023-01-07 | End: 2023-01-10

## 2023-01-07 RX ORDER — CARBOPROST TROMETHAMINE 250 UG/ML
250 INJECTION, SOLUTION INTRAMUSCULAR ONCE
Refills: 0 | Status: COMPLETED | OUTPATIENT
Start: 2023-01-07 | End: 2023-01-07

## 2023-01-07 RX ORDER — OXYCODONE HYDROCHLORIDE 5 MG/1
5 TABLET ORAL
Refills: 0 | Status: DISCONTINUED | OUTPATIENT
Start: 2023-01-07 | End: 2023-01-07

## 2023-01-07 RX ORDER — IBUPROFEN 200 MG
600 TABLET ORAL EVERY 6 HOURS
Refills: 0 | Status: DISCONTINUED | OUTPATIENT
Start: 2023-01-07 | End: 2023-01-07

## 2023-01-07 RX ORDER — MAGNESIUM HYDROXIDE 400 MG/1
30 TABLET, CHEWABLE ORAL
Refills: 0 | Status: DISCONTINUED | OUTPATIENT
Start: 2023-01-07 | End: 2023-01-10

## 2023-01-07 RX ORDER — ONDANSETRON 8 MG/1
4 TABLET, FILM COATED ORAL ONCE
Refills: 0 | Status: COMPLETED | OUTPATIENT
Start: 2023-01-07 | End: 2023-01-07

## 2023-01-07 RX ORDER — OXYCODONE HYDROCHLORIDE 5 MG/1
5 TABLET ORAL EVERY 6 HOURS
Refills: 0 | Status: DISCONTINUED | OUTPATIENT
Start: 2023-01-07 | End: 2023-01-07

## 2023-01-07 RX ORDER — LANOLIN
1 OINTMENT (GRAM) TOPICAL EVERY 6 HOURS
Refills: 0 | Status: DISCONTINUED | OUTPATIENT
Start: 2023-01-07 | End: 2023-01-10

## 2023-01-07 RX ORDER — MAGNESIUM SULFATE 500 MG/ML
1 VIAL (ML) INJECTION
Qty: 40 | Refills: 0 | Status: DISCONTINUED | OUTPATIENT
Start: 2023-01-07 | End: 2023-01-07

## 2023-01-07 RX ORDER — AER TRAVELER 0.5 G/1
1 SOLUTION RECTAL; TOPICAL EVERY 4 HOURS
Refills: 0 | Status: DISCONTINUED | OUTPATIENT
Start: 2023-01-07 | End: 2023-01-10

## 2023-01-07 RX ORDER — DIPHENOXYLATE HCL/ATROPINE 2.5-.025MG
2 TABLET ORAL ONCE
Refills: 0 | Status: DISCONTINUED | OUTPATIENT
Start: 2023-01-07 | End: 2023-01-07

## 2023-01-07 RX ORDER — HYDROCORTISONE 1 %
1 OINTMENT (GRAM) TOPICAL EVERY 6 HOURS
Refills: 0 | Status: DISCONTINUED | OUTPATIENT
Start: 2023-01-07 | End: 2023-01-10

## 2023-01-07 RX ORDER — PRAMOXINE HYDROCHLORIDE 150 MG/15G
1 AEROSOL, FOAM RECTAL EVERY 4 HOURS
Refills: 0 | Status: DISCONTINUED | OUTPATIENT
Start: 2023-01-07 | End: 2023-01-10

## 2023-01-07 RX ORDER — DIPHENHYDRAMINE HCL 50 MG
25 CAPSULE ORAL EVERY 6 HOURS
Refills: 0 | Status: DISCONTINUED | OUTPATIENT
Start: 2023-01-07 | End: 2023-01-10

## 2023-01-07 RX ORDER — BENZOCAINE 10 %
1 GEL (GRAM) MUCOUS MEMBRANE EVERY 6 HOURS
Refills: 0 | Status: DISCONTINUED | OUTPATIENT
Start: 2023-01-07 | End: 2023-01-10

## 2023-01-07 RX ORDER — SODIUM CHLORIDE 9 MG/ML
3 INJECTION INTRAMUSCULAR; INTRAVENOUS; SUBCUTANEOUS EVERY 8 HOURS
Refills: 0 | Status: DISCONTINUED | OUTPATIENT
Start: 2023-01-07 | End: 2023-01-10

## 2023-01-07 RX ORDER — OXYTOCIN 10 UNIT/ML
10 VIAL (ML) INJECTION ONCE
Refills: 0 | Status: COMPLETED | OUTPATIENT
Start: 2023-01-07 | End: 2023-01-07

## 2023-01-07 RX ORDER — KETOROLAC TROMETHAMINE 30 MG/ML
30 SYRINGE (ML) INJECTION ONCE
Refills: 0 | Status: DISCONTINUED | OUTPATIENT
Start: 2023-01-07 | End: 2023-01-07

## 2023-01-07 RX ORDER — HEPARIN SODIUM 5000 [USP'U]/ML
5000 INJECTION INTRAVENOUS; SUBCUTANEOUS EVERY 12 HOURS
Refills: 0 | Status: DISCONTINUED | OUTPATIENT
Start: 2023-01-07 | End: 2023-01-10

## 2023-01-07 RX ORDER — SIMETHICONE 80 MG/1
80 TABLET, CHEWABLE ORAL EVERY 4 HOURS
Refills: 0 | Status: DISCONTINUED | OUTPATIENT
Start: 2023-01-07 | End: 2023-01-10

## 2023-01-07 RX ORDER — OXYCODONE HYDROCHLORIDE 5 MG/1
5 TABLET ORAL
Refills: 0 | Status: DISCONTINUED | OUTPATIENT
Start: 2023-01-07 | End: 2023-01-10

## 2023-01-07 RX ORDER — MAGNESIUM SULFATE 500 MG/ML
2 VIAL (ML) INJECTION
Qty: 40 | Refills: 0 | Status: DISCONTINUED | OUTPATIENT
Start: 2023-01-07 | End: 2023-01-08

## 2023-01-07 RX ORDER — OXYTOCIN 10 UNIT/ML
333.33 VIAL (ML) INJECTION
Qty: 20 | Refills: 0 | Status: DISCONTINUED | OUTPATIENT
Start: 2023-01-07 | End: 2023-01-10

## 2023-01-07 RX ORDER — FAMOTIDINE 10 MG/ML
20 INJECTION INTRAVENOUS ONCE
Refills: 0 | Status: COMPLETED | OUTPATIENT
Start: 2023-01-07 | End: 2023-01-07

## 2023-01-07 RX ORDER — ACETAMINOPHEN 500 MG
1000 TABLET ORAL ONCE
Refills: 0 | Status: COMPLETED | OUTPATIENT
Start: 2023-01-07 | End: 2023-01-07

## 2023-01-07 RX ADMIN — Medication 10 UNIT(S): at 07:53

## 2023-01-07 RX ADMIN — HEPARIN SODIUM 5000 UNIT(S): 5000 INJECTION INTRAVENOUS; SUBCUTANEOUS at 20:04

## 2023-01-07 RX ADMIN — OXYCODONE HYDROCHLORIDE 5 MILLIGRAM(S): 5 TABLET ORAL at 20:04

## 2023-01-07 RX ADMIN — Medication 2 TABLET(S): at 08:05

## 2023-01-07 RX ADMIN — AMLODIPINE BESYLATE 10 MILLIGRAM(S): 2.5 TABLET ORAL at 22:02

## 2023-01-07 RX ADMIN — Medication 975 MILLIGRAM(S): at 15:34

## 2023-01-07 RX ADMIN — Medication 1000 MILLIUNIT(S)/MIN: at 10:41

## 2023-01-07 RX ADMIN — Medication 200 MILLIGRAM(S): at 20:04

## 2023-01-07 RX ADMIN — SODIUM CHLORIDE 3 MILLILITER(S): 9 INJECTION INTRAMUSCULAR; INTRAVENOUS; SUBCUTANEOUS at 13:03

## 2023-01-07 RX ADMIN — PRAMOXINE HYDROCHLORIDE 1 APPLICATION(S): 150 AEROSOL, FOAM RECTAL at 12:09

## 2023-01-07 RX ADMIN — PANTOPRAZOLE SODIUM 20 MILLIGRAM(S): 20 TABLET, DELAYED RELEASE ORAL at 13:23

## 2023-01-07 RX ADMIN — Medication 975 MILLIGRAM(S): at 15:04

## 2023-01-07 RX ADMIN — OXYCODONE HYDROCHLORIDE 5 MILLIGRAM(S): 5 TABLET ORAL at 17:38

## 2023-01-07 RX ADMIN — OXYCODONE HYDROCHLORIDE 5 MILLIGRAM(S): 5 TABLET ORAL at 18:08

## 2023-01-07 RX ADMIN — CARBOPROST TROMETHAMINE 250 MICROGRAM(S): 250 INJECTION, SOLUTION INTRAMUSCULAR at 08:02

## 2023-01-07 RX ADMIN — Medication 12.5 GM/HR: at 05:30

## 2023-01-07 RX ADMIN — Medication 108 GRAM(S): at 06:10

## 2023-01-07 RX ADMIN — Medication 400 MILLIGRAM(S): at 10:15

## 2023-01-07 RX ADMIN — Medication 975 MILLIGRAM(S): at 22:00

## 2023-01-07 RX ADMIN — FAMOTIDINE 20 MILLIGRAM(S): 10 INJECTION INTRAVENOUS at 01:54

## 2023-01-07 RX ADMIN — Medication 108 GRAM(S): at 02:14

## 2023-01-07 RX ADMIN — AZATHIOPRINE 100 MILLIGRAM(S): 100 TABLET ORAL at 20:04

## 2023-01-07 RX ADMIN — Medication 975 MILLIGRAM(S): at 22:02

## 2023-01-07 RX ADMIN — Medication 100 MILLIGRAM(S): at 22:02

## 2023-01-07 RX ADMIN — ONDANSETRON 4 MILLIGRAM(S): 8 TABLET, FILM COATED ORAL at 03:15

## 2023-01-07 RX ADMIN — Medication 1000 MILLIGRAM(S): at 10:41

## 2023-01-07 NOTE — OB NEONATOLOGY/PEDIATRICIAN DELIVERY SUMMARY - NSPEDSNEONOTESA_OBGYN_ALL_OB_FT
Baby is a 35.6 GA male born to a 31yo  via . Maternal hx notable for lupus nephritis (benign kidney bx). Pregnancy also notable for IOL for sPEC on magnesium. MBT B+. PNL neg/nr/imm. GBS unknown. SROM 8 hrs PTD w/ clear fluids. Baby born vigorous and crying spontaneously. DCC x 1 min. WDSS. Apgars 9/9. EOS: 0.36  mom wants to breastfeed, wants hepB, denies circ.

## 2023-01-07 NOTE — OB PROVIDER LABOR PROGRESS NOTE - ASSESSMENT
32y  35w5d admitted for IOL 2/2 lupus nephritis, met criteria for siPEC.    -Labor: s/p BC/CB. c/w pitocin. SROM@6855a. C/w peanutball and high Fowlers for fetal descent  -Fetus: cat 1, reassuring  -GBS: unk on amp  -Pain: epidural in place, effective    D/w Dr. Atif Mcguire PGY1

## 2023-01-07 NOTE — OB PROVIDER DELIVERY SUMMARY - NSSELHIDDEN_OBGYN_ALL_OB_FT
[NS_DeliveryAttending1_OBGYN_ALL_OB_FT:[id number]],[NS_DeliveryAssist1_OBGYN_ALL_OB_FT:[id number]]

## 2023-01-07 NOTE — OB RN DELIVERY SUMMARY - NSSELHIDDEN_OBGYN_ALL_OB_FT
[NS_DeliveryAttending1_OBGYN_ALL_OB_FT:[id number]],[NS_DeliveryAssist1_OBGYN_ALL_OB_FT:[id number]],[NS_DeliveryRN_OBGYN_ALL_OB_FT:[id number]],[NS_CirculateRN2_OBGYN_ALL_OB_FT:[id number]]

## 2023-01-07 NOTE — OB PROVIDER LABOR PROGRESS NOTE - ASSESSMENT
32y  35w5d admitted for IOL 2/2 lupus nephritis, met criteria for siPEC.    -Labor: s/p BC/CB. c/w pitocin. SROM@5995a. C/w peanutball and high Fowlers for fetal descent  -Fetus: cat 1, reassuring  -GBS: unk on amp  -Pain: epidural in place, effective    D/w Dr. Atif Mcguire PGY1

## 2023-01-07 NOTE — OB RN DELIVERY SUMMARY - NS_DELBLDTRANSFUS_OBGYN_ALL_OB
RX call attempt 1 regarding Vemlidy refill from [facility]. Patient reached-- had his wife Yolie count medication and stated he has 14 days of medication remaining making his last dose taken on Friday, 3/20.. I informed him due to his surplus that we would pend out the refill and give him a call back next week to schedule, patient agreed.. Pending out accordingly.   (copay 0.00)   No

## 2023-01-07 NOTE — OB PROVIDER DELIVERY SUMMARY - NSPROVIDERDELIVERYNOTE_OBGYN_ALL_OB_FT
Patient was found to be fully dilated and directed to push with contractions. Spontaneous vaginal delivery of liveborn male. Head, shoulders and body delivered easily. Cord was delayed 1 minute. Cord was cut.  Infant was passed to mother. Placenta delivered spontaneously intact. Uterine massage was performed and pitocin was given. Fundus examined and noted to be boggy w/ some brisk bleeding. Bimanual massage performed to evacuate clots. Approximately 150cc's evacuated. Fundal firmness improved, however ALICIA atony present w/ brisk bleeding. IM Pit, rectal Cyto, and Hemabate administered. Fundus was reexamined and noted to be firm. Vaginal walls, sulci, and cervix examined and second degree laceration noted. Second degree laceration repaired with chromic.  Good hemostasis was noted.  Count correct x2. Patient was found to be fully dilated and directed to push with contractions. Spontaneous vaginal delivery of liveborn male. Head, shoulders and body delivered easily. Cord clamping was delayed 1 minute. Cord was cut.  Infant was passed to mother. Placenta delivered spontaneously intact. Uterine massage was performed and pitocin was given. Fundus examined and noted to be boggy w/ some brisk bleeding. Bimanual massage performed to evacuate clots. Approximately 150cc's evacuated. Fundal firmness improved, however ALICIA atony present w/ brisk bleeding. IM Pit, rectal Cyto, and Hemabate administered. Fundus was reexamined and noted to be firm. Vaginal walls, sulci, and cervix examined and noted to be intact.  second degree perineal laceration noted. Second degree laceration repaired with chromic.  Good hemostasis was noted.  Count correct x2.

## 2023-01-07 NOTE — PROGRESS NOTE ADULT - SUBJECTIVE AND OBJECTIVE BOX
[facility] DIVISION OF KIDNEY DISEASES AND HYPERTENSION -- FOLLOW UP NOTE  --------------------------------------------------------------------------------  Chief Complaint:/subjective: no complaints  s/p vaginal delivery   feels tired    24 hour events:as above        PAST HISTORY  --------------------------------------------------------------------------------  No significant changes to PMH, PSH, FHx, SHx, unless otherwise noted    ALLERGIES & MEDICATIONS  --------------------------------------------------------------------------------  Allergies    No Known Allergies    Intolerances      Standing Inpatient Medications  acetaminophen     Tablet .. 975 milliGRAM(s) Oral <User Schedule>  amLODIPine   Tablet 10 milliGRAM(s) Oral daily  azaTHIOprine 100 milliGRAM(s) Oral daily  chlorhexidine 2% Cloths 1 Application(s) Topical once  citric acid/sodium citrate Solution 15 milliLiter(s) Oral every 6 hours  dextrose 5% + lactated ringers. 1000 milliLiter(s) IV Continuous <Continuous>  diphtheria/tetanus/pertussis (acellular) Vaccine (Adacel) 0.5 milliLiter(s) IntraMuscular once  heparin   Injectable 5000 Unit(s) SubCutaneous every 12 hours  hydroxychloroquine 200 milliGRAM(s) Oral daily  labetalol 100 milliGRAM(s) Oral daily  lactated ringers. 1000 milliLiter(s) IV Continuous <Continuous>  magnesium sulfate Infusion 0.5 Gm/Hr IV Continuous <Continuous>  oxytocin Infusion 333.333 milliUNIT(s)/Min IV Continuous <Continuous>  oxytocin Infusion 333.333 milliUNIT(s)/Min IV Continuous <Continuous>  oxytocin Infusion. 2 milliUNIT(s)/Min IV Continuous <Continuous>  pantoprazole    Tablet 20 milliGRAM(s) Oral before breakfast  prenatal multivitamin 1 Tablet(s) Oral daily  sodium chloride 0.9% lock flush 3 milliLiter(s) IV Push every 8 hours    PRN Inpatient Medications  benzocaine 20%/menthol 0.5% Spray 1 Spray(s) Topical every 6 hours PRN  dibucaine 1% Ointment 1 Application(s) Topical every 6 hours PRN  diphenhydrAMINE 25 milliGRAM(s) Oral every 6 hours PRN  hydrocortisone 1% Cream 1 Application(s) Topical every 6 hours PRN  lanolin Ointment 1 Application(s) Topical every 6 hours PRN  magnesium hydroxide Suspension 30 milliLiter(s) Oral two times a day PRN  pramoxine 1%/zinc 5% Cream 1 Application(s) Topical every 4 hours PRN  simethicone 80 milliGRAM(s) Chew every 4 hours PRN  witch hazel Pads 1 Application(s) Topical every 4 hours PRN      REVIEW OF SYSTEMS  --------------------------------------------------------------------------------  Gen: No weight changes, fatigue, fevers/chills, weakness  Skin: No rashes  Head/Eyes/Ears/Mouth: No headache;   Respiratory: No dyspnea, cough  CV: No chest pain, PND, orthopnea  GI: No abdominal pain, diarrhea, constipation, nausea, vomiting  : No increased frequency, dysuria, hematuria, nocturia  MSK: No joint pain/swelling; no back pain; no edema  Neuro: No dizziness/lightheadedness, weakness  Heme: No easy bruising or bleeding  Psych: No significant nervousness, anxiety, stress, depression    All other systems were reviewed and are negative, except as noted.    VITALS/PHYSICAL EXAM  --------------------------------------------------------------------------------  T(C): 36.5 (01-07-23 @ 10:20), Max: 37.1 (01-07-23 @ 08:20)  HR: 70 (01-07-23 @ 12:10) (60 - 115)  BP: 128/89 (01-07-23 @ 12:10) (100/61 - 153/90)  RR: 18 (01-07-23 @ 12:10) (16 - 18)  SpO2: 100% (01-07-23 @ 12:10) (93% - 100%)  Wt(kg): --  Adult Advanced Hemodynamics Last 24 Hrs  ABP: --  ABP(mean): --  CVP(mm Hg): --  CO: --  CI: --  PA: --  PA(mean): --  PCWP: --  SVR: --  SVRI: --  Height (cm): 157.5 (01-06-23 @ 09:34)  Weight (kg): 74.8 (01-06-23 @ 09:34)  BMI (kg/m2): 30.2 (01-06-23 @ 09:34)  BSA (m2): 1.76 (01-06-23 @ 09:34)      01-06-23 @ 07:01  -  01-07-23 @ 07:00  --------------------------------------------------------  IN: 4096.1 mL / OUT: 3225 mL / NET: 871.1 mL    01-07-23 @ 07:01  -  01-07-23 @ 12:23  --------------------------------------------------------  IN: 1323.2 mL / OUT: 1520 mL / NET: -196.8 mL      Physical Exam:  	Gen: NAD   	HEENT: , no jvp  	Pulm: CTA B/L  	CV: RRR, S1S2; no rub  	Back:  no sacral edema  	Abd: +BS, soft,    	: No suprapubic tenderness  	Ext: no edema  	Neuro:tired  	Skin: Warm,    	Vascular access:    LABS/STUDIES  --------------------------------------------------------------------------------              8.7    11.76 >-----------<  138      [01-07-23 @ 11:06]              25.2     Hemoglobin: 8.7 g/dL (01-07-23 @ 11:06)  Hemoglobin: 9.9 g/dL (01-07-23 @ 01:45)    Platelet Count - Automated: 138 K/uL (01-07-23 @ 11:06)  Platelet Count - Automated: 158 K/uL (01-07-23 @ 01:45)    132  |  100  |  18  ----------------------------<  133      [01-07-23 @ 11:06]  4.2   |  19  |  1.81        Ca     8.1     [01-07-23 @ 11:06]      Mg     5.8     [01-07-23 @ 01:47]    TPro  5.7  /  Alb  2.8  /  TBili  0.4  /  DBili  x   /  AST  21  /  ALT  7   /  AlkPhos  149  [01-07-23 @ 11:06]    PT/INR: PT 10.2 , INR 0.88       [01-07-23 @ 11:06]  PTT: 26.5       [01-07-23 @ 11:06]    Uric acid 8.7      [01-07-23 @ 11:06]        [01-07-23 @ 11:06]    Creatinine Trend:  SCr 1.81 [01-07 @ 11:06]  SCr 1.68 [01-07 @ 01:47]  SCr 1.58 [01-06 @ 18:30]  SCr 1.59 [01-06 @ 11:43]  SCr 1.64 [01-06 @ 05:51]    Urinalysis - [01-05-23 @ 16:11]      Color Light Yellow / Appearance Clear / SG 1.008 / pH 6.5      Gluc Negative / Ketone Negative  / Bili Negative / Urobili Negative       Blood Trace / Protein 100 mg/dl / Leuk Est Negative / Nitrite Negative      RBC 1 / WBC 1 / Hyaline  / Gran  / Sq Epi  / Non Sq Epi 1 / Bacteria Negative    Urine Creatinine 55      [01-05-23 @ 16:34]  Urine Protein 68      [01-05-23 @ 16:34]        Syphilis Screen (Treponema Pallidum Ab) Negative      [01-05-23 @ 16:11]

## 2023-01-07 NOTE — OB PROVIDER LABOR PROGRESS NOTE - NS_SUBJECTIVE/OBJECTIVE_OBGYN_ALL_OB_FT
Pt seen and examined at bedside.    Vital Signs Last 24 Hrs  T(C): 36.9 (06 Jan 2023 23:29), Max: 37.2 (06 Jan 2023 06:54)  T(F): 98.42 (06 Jan 2023 23:29), Max: 98.96 (06 Jan 2023 06:54)  HR: 89 (07 Jan 2023 00:15) (63 - 115)  BP: 138/92 (07 Jan 2023 00:14) (114/83 - 153/90)  BP(mean): --  RR: 16 (06 Jan 2023 23:29) (16 - 17)  SpO2: 100% (07 Jan 2023 00:15) (93% - 100%)
pt c/o feeling light headed and weak    T(C): 37.0 (01-06-23 @ 14:29), Max: 37.2 (01-06-23 @ 06:54)  HR: 92 (01-06-23 @ 18:45) (63 - 115)  BP: 130/95 (01-06-23 @ 18:42) (114/83 - 153/90)  RR: 17 (01-06-23 @ 10:05) (16 - 18)  SpO2: 100% (01-06-23 @ 18:45) (96% - 100%)
Pt seen and examined at bedside.    Vital Signs Last 24 Hrs  T(C): 36.7 (06 Jan 2023 21:29), Max: 37.2 (06 Jan 2023 06:54)  T(F): 98.06 (06 Jan 2023 21:29), Max: 98.96 (06 Jan 2023 06:54)  HR: 79 (06 Jan 2023 21:30) (63 - 115)  BP: 151/102 (06 Jan 2023 21:28) (114/83 - 153/90)  BP(mean): --  RR: 16 (06 Jan 2023 21:29) (16 - 17)  SpO2: 100% (06 Jan 2023 21:30) (96% - 100%)
Pt checked for insertion of cervical balloon
Pt seen and examined at bedside.    Vital Signs Last 24 Hrs  T(C): 36.8 (07 Jan 2023 03:31), Max: 37.2 (06 Jan 2023 06:54)  T(F): 98.24 (07 Jan 2023 03:31), Max: 98.96 (06 Jan 2023 06:54)  HR: 71 (07 Jan 2023 04:41) (63 - 115)  BP: 115/74 (07 Jan 2023 04:41) (100/61 - 153/90)  BP(mean): --  RR: 16 (07 Jan 2023 01:29) (16 - 17)  SpO2: 100% (07 Jan 2023 04:40) (93% - 100%)
Patient seen and evaluated for complaints of rectal pressure.     Vital Signs Last 24 Hrs  T(C): 36.9 (07 Jan 2023 05:30), Max: 37.2 (06 Jan 2023 09:34)  T(F): 98.42 (07 Jan 2023 05:30), Max: 98.6 (06 Jan 2023 10:05)  HR: 90 (07 Jan 2023 07:35) (63 - 115)  BP: 134/88 (07 Jan 2023 07:25) (100/61 - 153/90)  BP(mean): --  RR: 18 (07 Jan 2023 05:30) (16 - 18)  SpO2: 100% (07 Jan 2023 07:35) (93% - 100%)
Patient comfortable with epidural in place.    T(C): 37.0 (01-06-23 @ 10:05), Max: 37.2 (01-06-23 @ 06:54)  HR: 78 (01-06-23 @ 12:57) (63 - 114)  BP: 137/85 (01-06-23 @ 12:57) (119/91 - 148/98)  RR: 17 (01-06-23 @ 10:05) (16 - 18)  SpO2: 99% (01-06-23 @ 12:55) (96% - 100%)

## 2023-01-07 NOTE — PROGRESS NOTE ADULT - ATTENDING COMMENTS
Sent for worsening renal fxn, possible superimposed PEC; lupus  s/p vaginal delivery  BP fluctuations  #CKD3- with recent rise in cr to 1.6  now 1.8 post delivery; hemodynamic BP fluctuations/post partum equilibration occurring   monitor cr trend  no nsaids  #lupus//lupus nephritis  cont imuran  #pec    on mg drip  monitor levels closely as cr is uptrending   monitor PEC /HELLPS labs   #HTN- labetalol and amlodipine; monitor bp  #anemia- monitor trend

## 2023-01-07 NOTE — OB RN DELIVERY SUMMARY - NS_SEPSISRSKCALC_OBGYN_ALL_OB_FT
EOS calculated successfully. EOS Risk Factor: 0.5/1000 live births ([facility] national incidence); GA=35w6d; Temp=98.96; ROM=7.65; GBS='Unknown'; Antibiotics='GBS specific antibiotics > 2 hrs prior to birth'

## 2023-01-07 NOTE — OB PROVIDER LABOR PROGRESS NOTE - NS_OBIHIFHRDETAILS_OBGYN_ALL_OB_FT
130/mod/+accel/-decel
Cat 1: 135/mod variability/ - accels/ - decels
baseline 130, mod gagan, +accel, -decel

## 2023-01-07 NOTE — OB PROVIDER LABOR PROGRESS NOTE - ASSESSMENT
33yo  @35+6 IOL 2/2 SLE nephritis c/b siPEC/Mg. Patient stable, fully dilated.     Plan  - Anticipate     d/w Dr. Atif Lepe PGY3

## 2023-01-08 LAB
ALBUMIN SERPL ELPH-MCNC: 2.6 G/DL — LOW (ref 3.3–5)
ALBUMIN SERPL ELPH-MCNC: 2.9 G/DL — LOW (ref 3.3–5)
ALP SERPL-CCNC: 119 U/L — SIGNIFICANT CHANGE UP (ref 40–120)
ALP SERPL-CCNC: 134 U/L — HIGH (ref 40–120)
ALT FLD-CCNC: 10 U/L — SIGNIFICANT CHANGE UP (ref 10–45)
ALT FLD-CCNC: 8 U/L — LOW (ref 10–45)
ANION GAP SERPL CALC-SCNC: 13 MMOL/L — SIGNIFICANT CHANGE UP (ref 5–17)
ANION GAP SERPL CALC-SCNC: 14 MMOL/L — SIGNIFICANT CHANGE UP (ref 5–17)
APTT BLD: 27.8 SEC — SIGNIFICANT CHANGE UP (ref 27.5–35.5)
AST SERPL-CCNC: 19 U/L — SIGNIFICANT CHANGE UP (ref 10–40)
AST SERPL-CCNC: 23 U/L — SIGNIFICANT CHANGE UP (ref 10–40)
BILIRUB SERPL-MCNC: 0.3 MG/DL — SIGNIFICANT CHANGE UP (ref 0.2–1.2)
BILIRUB SERPL-MCNC: 0.3 MG/DL — SIGNIFICANT CHANGE UP (ref 0.2–1.2)
BUN SERPL-MCNC: 17 MG/DL — SIGNIFICANT CHANGE UP (ref 7–23)
BUN SERPL-MCNC: 18 MG/DL — SIGNIFICANT CHANGE UP (ref 7–23)
CALCIUM SERPL-MCNC: 7.8 MG/DL — LOW (ref 8.4–10.5)
CALCIUM SERPL-MCNC: 8.2 MG/DL — LOW (ref 8.4–10.5)
CHLORIDE SERPL-SCNC: 101 MMOL/L — SIGNIFICANT CHANGE UP (ref 96–108)
CHLORIDE SERPL-SCNC: 101 MMOL/L — SIGNIFICANT CHANGE UP (ref 96–108)
CO2 SERPL-SCNC: 20 MMOL/L — LOW (ref 22–31)
CO2 SERPL-SCNC: 20 MMOL/L — LOW (ref 22–31)
CREAT SERPL-MCNC: 2.01 MG/DL — HIGH (ref 0.5–1.3)
CREAT SERPL-MCNC: 2.07 MG/DL — HIGH (ref 0.5–1.3)
EGFR: 32 ML/MIN/1.73M2 — LOW
EGFR: 34 ML/MIN/1.73M2 — LOW
FIBRINOGEN PPP-MCNC: 600 MG/DL — HIGH (ref 200–445)
GLUCOSE SERPL-MCNC: 100 MG/DL — HIGH (ref 70–99)
GLUCOSE SERPL-MCNC: 110 MG/DL — HIGH (ref 70–99)
HCT VFR BLD CALC: 20.9 % — CRITICAL LOW (ref 34.5–45)
HCT VFR BLD CALC: 21.8 % — LOW (ref 34.5–45)
HGB BLD-MCNC: 7.1 G/DL — LOW (ref 11.5–15.5)
HGB BLD-MCNC: 7.3 G/DL — LOW (ref 11.5–15.5)
INR BLD: 0.89 RATIO — SIGNIFICANT CHANGE UP (ref 0.88–1.16)
LDH SERPL L TO P-CCNC: 344 U/L — HIGH (ref 50–242)
MAGNESIUM SERPL-MCNC: 6.7 MG/DL — HIGH (ref 1.6–2.6)
MAGNESIUM SERPL-MCNC: 6.8 MG/DL — HIGH (ref 1.6–2.6)
MCHC RBC-ENTMCNC: 33.2 PG — SIGNIFICANT CHANGE UP (ref 27–34)
MCHC RBC-ENTMCNC: 33.5 GM/DL — SIGNIFICANT CHANGE UP (ref 32–36)
MCHC RBC-ENTMCNC: 33.6 PG — SIGNIFICANT CHANGE UP (ref 27–34)
MCHC RBC-ENTMCNC: 34 GM/DL — SIGNIFICANT CHANGE UP (ref 32–36)
MCV RBC AUTO: 99.1 FL — SIGNIFICANT CHANGE UP (ref 80–100)
MCV RBC AUTO: 99.1 FL — SIGNIFICANT CHANGE UP (ref 80–100)
NRBC # BLD: 0 /100 WBCS — SIGNIFICANT CHANGE UP (ref 0–0)
NRBC # BLD: 0 /100 WBCS — SIGNIFICANT CHANGE UP (ref 0–0)
PLATELET # BLD AUTO: 124 K/UL — LOW (ref 150–400)
PLATELET # BLD AUTO: 171 K/UL — SIGNIFICANT CHANGE UP (ref 150–400)
POTASSIUM SERPL-MCNC: 4.4 MMOL/L — SIGNIFICANT CHANGE UP (ref 3.5–5.3)
POTASSIUM SERPL-MCNC: 4.5 MMOL/L — SIGNIFICANT CHANGE UP (ref 3.5–5.3)
POTASSIUM SERPL-SCNC: 4.4 MMOL/L — SIGNIFICANT CHANGE UP (ref 3.5–5.3)
POTASSIUM SERPL-SCNC: 4.5 MMOL/L — SIGNIFICANT CHANGE UP (ref 3.5–5.3)
PROT SERPL-MCNC: 5.1 G/DL — LOW (ref 6–8.3)
PROT SERPL-MCNC: 5.8 G/DL — LOW (ref 6–8.3)
PROTHROM AB SERPL-ACNC: 10.2 SEC — LOW (ref 10.5–13.4)
RBC # BLD: 2.11 M/UL — LOW (ref 3.8–5.2)
RBC # BLD: 2.2 M/UL — LOW (ref 3.8–5.2)
RBC # FLD: 14.5 % — SIGNIFICANT CHANGE UP (ref 10.3–14.5)
RBC # FLD: 14.5 % — SIGNIFICANT CHANGE UP (ref 10.3–14.5)
SODIUM SERPL-SCNC: 134 MMOL/L — LOW (ref 135–145)
SODIUM SERPL-SCNC: 135 MMOL/L — SIGNIFICANT CHANGE UP (ref 135–145)
URATE SERPL-MCNC: 9 MG/DL — HIGH (ref 2.5–7)
WBC # BLD: 9.22 K/UL — SIGNIFICANT CHANGE UP (ref 3.8–10.5)
WBC # BLD: 9.95 K/UL — SIGNIFICANT CHANGE UP (ref 3.8–10.5)
WBC # FLD AUTO: 9.22 K/UL — SIGNIFICANT CHANGE UP (ref 3.8–10.5)
WBC # FLD AUTO: 9.95 K/UL — SIGNIFICANT CHANGE UP (ref 3.8–10.5)

## 2023-01-08 PROCEDURE — 99233 SBSQ HOSP IP/OBS HIGH 50: CPT

## 2023-01-08 RX ORDER — MINERAL OIL
133 OIL (ML) MISCELLANEOUS ONCE
Refills: 0 | Status: COMPLETED | OUTPATIENT
Start: 2023-01-08 | End: 2023-01-08

## 2023-01-08 RX ORDER — FERROUS SULFATE 325(65) MG
325 TABLET ORAL DAILY
Refills: 0 | Status: DISCONTINUED | OUTPATIENT
Start: 2023-01-08 | End: 2023-01-10

## 2023-01-08 RX ORDER — ASCORBIC ACID 60 MG
500 TABLET,CHEWABLE ORAL DAILY
Refills: 0 | Status: DISCONTINUED | OUTPATIENT
Start: 2023-01-08 | End: 2023-01-10

## 2023-01-08 RX ORDER — SENNA PLUS 8.6 MG/1
1 TABLET ORAL DAILY
Refills: 0 | Status: DISCONTINUED | OUTPATIENT
Start: 2023-01-08 | End: 2023-01-10

## 2023-01-08 RX ORDER — POLYETHYLENE GLYCOL 3350 17 G/17G
17 POWDER, FOR SOLUTION ORAL DAILY
Refills: 0 | Status: DISCONTINUED | OUTPATIENT
Start: 2023-01-08 | End: 2023-01-10

## 2023-01-08 RX ADMIN — SODIUM CHLORIDE 3 MILLILITER(S): 9 INJECTION INTRAMUSCULAR; INTRAVENOUS; SUBCUTANEOUS at 20:30

## 2023-01-08 RX ADMIN — Medication 325 MILLIGRAM(S): at 14:04

## 2023-01-08 RX ADMIN — Medication 975 MILLIGRAM(S): at 16:13

## 2023-01-08 RX ADMIN — Medication 975 MILLIGRAM(S): at 16:43

## 2023-01-08 RX ADMIN — Medication 975 MILLIGRAM(S): at 09:38

## 2023-01-08 RX ADMIN — Medication 500 MILLIGRAM(S): at 14:03

## 2023-01-08 RX ADMIN — POLYETHYLENE GLYCOL 3350 17 GRAM(S): 17 POWDER, FOR SOLUTION ORAL at 18:28

## 2023-01-08 RX ADMIN — Medication 200 MILLIGRAM(S): at 20:05

## 2023-01-08 RX ADMIN — OXYCODONE HYDROCHLORIDE 5 MILLIGRAM(S): 5 TABLET ORAL at 02:07

## 2023-01-08 RX ADMIN — HEPARIN SODIUM 5000 UNIT(S): 5000 INJECTION INTRAVENOUS; SUBCUTANEOUS at 08:37

## 2023-01-08 RX ADMIN — AZATHIOPRINE 100 MILLIGRAM(S): 100 TABLET ORAL at 20:05

## 2023-01-08 RX ADMIN — Medication 975 MILLIGRAM(S): at 04:59

## 2023-01-08 RX ADMIN — MAGNESIUM HYDROXIDE 30 MILLILITER(S): 400 TABLET, CHEWABLE ORAL at 15:25

## 2023-01-08 RX ADMIN — SENNA PLUS 1 TABLET(S): 8.6 TABLET ORAL at 12:02

## 2023-01-08 RX ADMIN — Medication 100 MILLIGRAM(S): at 21:04

## 2023-01-08 RX ADMIN — Medication 975 MILLIGRAM(S): at 05:23

## 2023-01-08 RX ADMIN — HEPARIN SODIUM 5000 UNIT(S): 5000 INJECTION INTRAVENOUS; SUBCUTANEOUS at 20:05

## 2023-01-08 RX ADMIN — PANTOPRAZOLE SODIUM 20 MILLIGRAM(S): 20 TABLET, DELAYED RELEASE ORAL at 08:45

## 2023-01-08 RX ADMIN — MAGNESIUM HYDROXIDE 30 MILLILITER(S): 400 TABLET, CHEWABLE ORAL at 22:44

## 2023-01-08 RX ADMIN — AMLODIPINE BESYLATE 10 MILLIGRAM(S): 2.5 TABLET ORAL at 21:08

## 2023-01-08 RX ADMIN — Medication 975 MILLIGRAM(S): at 22:04

## 2023-01-08 RX ADMIN — Medication 133 MILLILITER(S): at 20:09

## 2023-01-08 RX ADMIN — Medication 1 TABLET(S): at 12:03

## 2023-01-08 RX ADMIN — Medication 975 MILLIGRAM(S): at 21:04

## 2023-01-08 RX ADMIN — Medication 975 MILLIGRAM(S): at 10:08

## 2023-01-08 NOTE — PROGRESS NOTE ADULT - SUBJECTIVE AND OBJECTIVE BOX
[facility] DIVISION OF KIDNEY DISEASES AND HYPERTENSION -- FOLLOW UP NOTE  --------------------------------------------------------------------------------  Chief Complaint:/subjective: c/o feeling tired; no sob//ha; was lightheaded this am but now better after mag drip stopped    24 hour events: as above; cr uptrending         PAST HISTORY  --------------------------------------------------------------------------------  No significant changes to PMH, PSH, FHx, SHx, unless otherwise noted    ALLERGIES & MEDICATIONS  --------------------------------------------------------------------------------  Allergies    No Known Allergies    Intolerances      Standing Inpatient Medications  acetaminophen     Tablet .. 975 milliGRAM(s) Oral <User Schedule>  amLODIPine   Tablet 10 milliGRAM(s) Oral daily  azaTHIOprine 100 milliGRAM(s) Oral daily  diphtheria/tetanus/pertussis (acellular) Vaccine (Adacel) 0.5 milliLiter(s) IntraMuscular once  heparin   Injectable 5000 Unit(s) SubCutaneous every 12 hours  hydroxychloroquine 200 milliGRAM(s) Oral daily  labetalol 100 milliGRAM(s) Oral daily  oxytocin Infusion 333.333 milliUNIT(s)/Min IV Continuous <Continuous>  oxytocin Infusion 333.333 milliUNIT(s)/Min IV Continuous <Continuous>  oxytocin Infusion. 2 milliUNIT(s)/Min IV Continuous <Continuous>  pantoprazole    Tablet 20 milliGRAM(s) Oral before breakfast  prenatal multivitamin 1 Tablet(s) Oral daily  senna 1 Tablet(s) Oral daily  sodium chloride 0.9% lock flush 3 milliLiter(s) IV Push every 8 hours    PRN Inpatient Medications  benzocaine 20%/menthol 0.5% Spray 1 Spray(s) Topical every 6 hours PRN  dibucaine 1% Ointment 1 Application(s) Topical every 6 hours PRN  diphenhydrAMINE 25 milliGRAM(s) Oral every 6 hours PRN  hydrocortisone 1% Cream 1 Application(s) Topical every 6 hours PRN  lanolin Ointment 1 Application(s) Topical every 6 hours PRN  magnesium hydroxide Suspension 30 milliLiter(s) Oral two times a day PRN  oxyCODONE    IR 5 milliGRAM(s) Oral every 3 hours PRN  pramoxine 1%/zinc 5% Cream 1 Application(s) Topical every 4 hours PRN  simethicone 80 milliGRAM(s) Chew every 4 hours PRN  witch hazel Pads 1 Application(s) Topical every 4 hours PRN      REVIEW OF SYSTEMS  --------------------------------------------------------------------------------  Gen: No weight changes, fatigue, fevers/chills, weakness  Skin: No rashes  Head/Eyes/Ears/Mouth: No headache;   Respiratory: No dyspnea, cough  CV: No chest pain, PND, orthopnea  GI: No abdominal pain, diarrhea, constipation, nausea, vomiting  : No increased frequency, dysuria, hematuria, nocturia  MSK: No joint pain/swelling; no back pain; no edema  Neuro: No dizziness/lightheadedness, weakness  Heme: No easy bruising or bleeding  Psych: No significant nervousness, anxiety, stress, depression    All other systems were reviewed and are negative, except as noted.    VITALS/PHYSICAL EXAM  --------------------------------------------------------------------------------  T(C): 36.6 (01-08-23 @ 12:30), Max: 36.8 (01-07-23 @ 17:11)  HR: 72 (01-08-23 @ 12:30) (65 - 84)  BP: 111/74 (01-08-23 @ 12:30) (100/67 - 129/88)  RR: 18 (01-08-23 @ 12:30) (17 - 18)  SpO2: 98% (01-08-23 @ 12:30) (96% - 100%)  Wt(kg): --  Adult Advanced Hemodynamics Last 24 Hrs  ABP: --  ABP(mean): --  CVP(mm Hg): --  CO: --  CI: --  PA: --  PA(mean): --  PCWP: --  SVR: --  SVRI: --        01-07-23 @ 07:01  -  01-08-23 @ 07:00  --------------------------------------------------------  IN: 1706.2 mL / OUT: 6220 mL / NET: -4513.8 mL    01-08-23 @ 07:01  -  01-08-23 @ 12:44  --------------------------------------------------------  IN: 0 mL / OUT: 800 mL / NET: -800 mL      Physical Exam:  	Gen: NAD,    	HEENT:  , no jvp  	Pulm: CTA B/L  	CV: RRR, S1S2; no rub  	Back:   no sacral edema  	Abd: +BS, soft,    	: No suprapubic tenderness  	Ext: no edema  	Neuro: awake  	Psych: alert  	Skin: Warm   	Vascular access:    LABS/STUDIES  --------------------------------------------------------------------------------              7.3    9.95  >-----------<  171      [01-08-23 @ 11:28]              21.8     Hemoglobin: 7.3 g/dL (01-08-23 @ 11:28)  Hemoglobin: 7.1 g/dL (01-08-23 @ 06:29)    Platelet Count - Automated: 171 K/uL (01-08-23 @ 11:28)  Platelet Count - Automated: 124 K/uL (01-08-23 @ 06:29)    134  |  101  |  17  ----------------------------<  100      [01-08-23 @ 06:30]  4.5   |  20  |  2.01        Ca     7.8     [01-08-23 @ 06:30]      Mg     6.7     [01-08-23 @ 06:29]    TPro  5.1  /  Alb  2.6  /  TBili  0.3  /  DBili  x   /  AST  19  /  ALT  8   /  AlkPhos  119  [01-08-23 @ 06:30]    PT/INR: PT 10.2 , INR 0.89       [01-08-23 @ 06:29]  PTT: 27.8       [01-08-23 @ 06:29]    Uric acid 9.0      [01-08-23 @ 06:30]        [01-08-23 @ 06:30]    Creatinine Trend:  SCr 2.01 [01-08 @ 06:30]  SCr 1.81 [01-07 @ 11:06]  SCr 1.68 [01-07 @ 01:47]  SCr 1.58 [01-06 @ 18:30]  SCr 1.59 [01-06 @ 11:43]    Urinalysis - [01-05-23 @ 16:11]      Color Light Yellow / Appearance Clear / SG 1.008 / pH 6.5      Gluc Negative / Ketone Negative  / Bili Negative / Urobili Negative       Blood Trace / Protein 100 mg/dl / Leuk Est Negative / Nitrite Negative      RBC 1 / WBC 1 / Hyaline  / Gran  / Sq Epi  / Non Sq Epi 1 / Bacteria Negative    Urine Creatinine 55      [01-05-23 @ 16:34]  Urine Protein 68      [01-05-23 @ 16:34]        Syphilis Screen (Treponema Pallidum Ab) Negative      [01-05-23 @ 16:11]

## 2023-01-08 NOTE — PROGRESS NOTE ADULT - SUBJECTIVE AND OBJECTIVE BOX
OB Progress Note:  PPD#1    S: 31yo PPD#2 s/p  c/b siPEC and lupus nephritis. Patient reports "feeling out of it and sluggish from the magnesium". Pain is well controlled, tolerating regular diet, passing flatus, voiding spontaneously. Endorses some difficulty with ambulation 2/2 to lightheadedness Improves when the magnesium is paused. Denies heavy vaginal bleeding, CP/SOB, leadheadedness/dizziness, N/V.    O:  Vitals:   Vital Signs Last 24 Hrs  T(C): 36.5 (2023 06:00), Max: 37.1 (2023 08:20)  T(F): 97.7 (2023 06:00), Max: 98.8 (2023 08:20)  HR: 68 (2023 06:00) (60 - 109)  BP: 109/70 (2023 06:00) (100/67 - 141/93)  BP(mean): --  RR: 18 (2023 06:00) (16 - 18)  SpO2: 97% (2023 06:00) (94% - 100%)    Parameters below as of 2023 06:00  Patient On (Oxygen Delivery Method): room air        MEDICATIONS  (STANDING):  acetaminophen     Tablet .. 975 milliGRAM(s) Oral <User Schedule>  amLODIPine   Tablet 10 milliGRAM(s) Oral daily  azaTHIOprine 100 milliGRAM(s) Oral daily  diphtheria/tetanus/pertussis (acellular) Vaccine (Adacel) 0.5 milliLiter(s) IntraMuscular once  heparin   Injectable 5000 Unit(s) SubCutaneous every 12 hours  hydroxychloroquine 200 milliGRAM(s) Oral daily  labetalol 100 milliGRAM(s) Oral daily  magnesium sulfate Infusion 2 Gm/Hr (50 mL/Hr) IV Continuous <Continuous>  oxytocin Infusion 333.333 milliUNIT(s)/Min (1000 mL/Hr) IV Continuous <Continuous>  oxytocin Infusion 333.333 milliUNIT(s)/Min (1000 mL/Hr) IV Continuous <Continuous>  oxytocin Infusion. 2 milliUNIT(s)/Min (2 mL/Hr) IV Continuous <Continuous>  pantoprazole    Tablet 20 milliGRAM(s) Oral before breakfast  prenatal multivitamin 1 Tablet(s) Oral daily  senna 1 Tablet(s) Oral daily  sodium chloride 0.9% lock flush 3 milliLiter(s) IV Push every 8 hours    MEDICATIONS  (PRN):  benzocaine 20%/menthol 0.5% Spray 1 Spray(s) Topical every 6 hours PRN for Perineal discomfort  dibucaine 1% Ointment 1 Application(s) Topical every 6 hours PRN Perineal discomfort  diphenhydrAMINE 25 milliGRAM(s) Oral every 6 hours PRN Pruritus  hydrocortisone 1% Cream 1 Application(s) Topical every 6 hours PRN Moderate Pain (4-6)  lanolin Ointment 1 Application(s) Topical every 6 hours PRN nipple soreness  magnesium hydroxide Suspension 30 milliLiter(s) Oral two times a day PRN Constipation  oxyCODONE    IR 5 milliGRAM(s) Oral every 3 hours PRN Severe Pain (7 - 10)  pramoxine 1%/zinc 5% Cream 1 Application(s) Topical every 4 hours PRN Moderate Pain (4-6)  simethicone 80 milliGRAM(s) Chew every 4 hours PRN Gas  witch hazel Pads 1 Application(s) Topical every 4 hours PRN Perineal discomfort      Labs:  Blood type: B Positive  Rubella IgG: RPR: Negative                          8.7<L>   11.76<H> >-----------< 138<L>    (  @ 11:06 )             25.2<L>                        9.9<L>   9.41 >-----------< 158    (  @ 01:45 )             28.8<L>                        10.1<L>   7.48 >-----------< 172    (  @ 18:29 )             28.9<L>                        9.0<L>   7.85 >-----------< 147<L>    (  @ 11:43 )             26.3<L>                        9.8<L>   7.74 >-----------< 154    (  @ 05:51 )             29.1<L>                        9.6<L>   6.21 >-----------< 153    (  @ 22:50 )             28.0<L>                        9.9<L>   6.38 >-----------< 160    (  @ 16:11 )             28.9<L>    23 @ 11:06      132<L>  |  100  |  18  ----------------------------<  133<H>  4.2   |  19<L>  |  1.81<H>    23 @ 01:47      133<L>  |  101  |  18  ----------------------------<  106<H>  4.6   |  18<L>  |  1.68<H>    23 @ 18:30      136  |  104  |  19  ----------------------------<  92  4.3   |  19<L>  |  1.58<H>    23 @ 11:43      136  |  105  |  19  ----------------------------<  78  4.5   |  18<L>  |  1.59<H>    23 @ 05:51      137  |  106  |  20  ----------------------------<  79  4.7   |  18<L>  |  1.64<H>    23 @ 22:50      136  |  106  |  21  ----------------------------<  92  4.3   |  18<L>  |  1.60<H>    23 @ 16:54      135  |  105  |  23  ----------------------------<  78  4.6   |  17<L>  |  1.68<H>        Ca    8.1<L>      2023 11:06  Ca    8.7      2023 01:47  Ca    9.1      2023 18:30  Ca    9.1      2023 11:43  Ca    9.2      2023 05:51  Ca    8.5      2023 22:50  Ca    8.9      2023 16:54  Mg     6.8<H>       Mg     6.8<H>       Mg     5.4<H>       Mg     5.8<H>       Mg     4.4<H>       Mg     2.2       Mg     2.4       Mg     2.2         TPro  5.7<L>  /  Alb  2.8<L>  /  TBili  0.4  /  DBili  x   /  AST  21  /  ALT  7<L>  /  AlkPhos  149<H>  23 @ 11:06  TPro  6.6  /  Alb  3.1<L>  /  TBili  0.4  /  DBili  x   /  AST  32  /  ALT  10  /  AlkPhos  175<H>  23 @ 01:47  TPro  6.5  /  Alb  3.1<L>  /  TBili  0.3  /  DBili  x   /  AST  19  /  ALT  9<L>  /  AlkPhos  171<H>  23 @ 18:30  TPro  6.4  /  Alb  3.1<L>  /  TBili  0.4  /  DBili  x   /  AST  17  /  ALT  8<L>  /  AlkPhos  164<H>  23 @ 11:43  TPro  6.3  /  Alb  3.2<L>  /  TBili  0.3  /  DBili  x   /  AST  20  /  ALT  9<L>  /  AlkPhos  161<H>  23 @ 05:51  TPro  6.2  /  Alb  3.1<L>  /  TBili  0.2  /  DBili  x   /  AST  17  /  ALT  8<L>  /  AlkPhos  155<H>  23 @ 22:50  TPro  6.5  /  Alb  3.3  /  TBili  0.2  /  DBili  x   /  AST  18  /  ALT  10  /  AlkPhos  162<H>  23 @ 16:54          Physical Exam:  General: NAD  Abdomen: soft, non-tender, non-distended, fundus firm  Vaginal: No heavy vaginal bleeding  Extremities: No erythema/edema   OB Progress Note:  PPD#1    S: 33yo PPD#1 s/p  c/b siPEC and lupus nephritis. Patient reports "feeling out of it and sluggish from the magnesium". Pain is well controlled, tolerating regular diet, passing flatus, voiding spontaneously. Endorses some difficulty with ambulation 2/2 to lightheadedness Improves when the magnesium is paused. Denies heavy vaginal bleeding, CP/SOB, leadheadedness/dizziness, N/V.    O:  Vitals:   Vital Signs Last 24 Hrs  T(C): 36.5 (2023 06:00), Max: 37.1 (2023 08:20)  T(F): 97.7 (2023 06:00), Max: 98.8 (2023 08:20)  HR: 68 (2023 06:00) (60 - 109)  BP: 109/70 (2023 06:00) (100/67 - 141/93)  BP(mean): --  RR: 18 (2023 06:00) (16 - 18)  SpO2: 97% (2023 06:00) (94% - 100%)    Parameters below as of 2023 06:00  Patient On (Oxygen Delivery Method): room air        MEDICATIONS  (STANDING):  acetaminophen     Tablet .. 975 milliGRAM(s) Oral <User Schedule>  amLODIPine   Tablet 10 milliGRAM(s) Oral daily  azaTHIOprine 100 milliGRAM(s) Oral daily  diphtheria/tetanus/pertussis (acellular) Vaccine (Adacel) 0.5 milliLiter(s) IntraMuscular once  heparin   Injectable 5000 Unit(s) SubCutaneous every 12 hours  hydroxychloroquine 200 milliGRAM(s) Oral daily  labetalol 100 milliGRAM(s) Oral daily  magnesium sulfate Infusion 2 Gm/Hr (50 mL/Hr) IV Continuous <Continuous>  oxytocin Infusion 333.333 milliUNIT(s)/Min (1000 mL/Hr) IV Continuous <Continuous>  oxytocin Infusion 333.333 milliUNIT(s)/Min (1000 mL/Hr) IV Continuous <Continuous>  oxytocin Infusion. 2 milliUNIT(s)/Min (2 mL/Hr) IV Continuous <Continuous>  pantoprazole    Tablet 20 milliGRAM(s) Oral before breakfast  prenatal multivitamin 1 Tablet(s) Oral daily  senna 1 Tablet(s) Oral daily  sodium chloride 0.9% lock flush 3 milliLiter(s) IV Push every 8 hours    MEDICATIONS  (PRN):  benzocaine 20%/menthol 0.5% Spray 1 Spray(s) Topical every 6 hours PRN for Perineal discomfort  dibucaine 1% Ointment 1 Application(s) Topical every 6 hours PRN Perineal discomfort  diphenhydrAMINE 25 milliGRAM(s) Oral every 6 hours PRN Pruritus  hydrocortisone 1% Cream 1 Application(s) Topical every 6 hours PRN Moderate Pain (4-6)  lanolin Ointment 1 Application(s) Topical every 6 hours PRN nipple soreness  magnesium hydroxide Suspension 30 milliLiter(s) Oral two times a day PRN Constipation  oxyCODONE    IR 5 milliGRAM(s) Oral every 3 hours PRN Severe Pain (7 - 10)  pramoxine 1%/zinc 5% Cream 1 Application(s) Topical every 4 hours PRN Moderate Pain (4-6)  simethicone 80 milliGRAM(s) Chew every 4 hours PRN Gas  witch hazel Pads 1 Application(s) Topical every 4 hours PRN Perineal discomfort      Labs:  Blood type: B Positive  Rubella IgG: RPR: Negative                          8.7<L>   11.76<H> >-----------< 138<L>    (  @ 11:06 )             25.2<L>                        9.9<L>   9.41 >-----------< 158    (  @ 01:45 )             28.8<L>                        10.1<L>   7.48 >-----------< 172    (  @ 18:29 )             28.9<L>                        9.0<L>   7.85 >-----------< 147<L>    (  @ 11:43 )             26.3<L>                        9.8<L>   7.74 >-----------< 154    (  @ 05:51 )             29.1<L>                        9.6<L>   6.21 >-----------< 153    (  @ 22:50 )             28.0<L>                        9.9<L>   6.38 >-----------< 160    (  @ 16:11 )             28.9<L>    23 @ 11:06      132<L>  |  100  |  18  ----------------------------<  133<H>  4.2   |  19<L>  |  1.81<H>    23 @ 01:47      133<L>  |  101  |  18  ----------------------------<  106<H>  4.6   |  18<L>  |  1.68<H>    23 @ 18:30      136  |  104  |  19  ----------------------------<  92  4.3   |  19<L>  |  1.58<H>    23 @ 11:43      136  |  105  |  19  ----------------------------<  78  4.5   |  18<L>  |  1.59<H>    23 @ 05:51      137  |  106  |  20  ----------------------------<  79  4.7   |  18<L>  |  1.64<H>    23 @ 22:50      136  |  106  |  21  ----------------------------<  92  4.3   |  18<L>  |  1.60<H>    23 @ 16:54      135  |  105  |  23  ----------------------------<  78  4.6   |  17<L>  |  1.68<H>        Ca    8.1<L>      2023 11:06  Ca    8.7      2023 01:47  Ca    9.1      2023 18:30  Ca    9.1      2023 11:43  Ca    9.2      2023 05:51  Ca    8.5      2023 22:50  Ca    8.9      2023 16:54  Mg     6.8<H>       Mg     6.8<H>       Mg     5.4<H>       Mg     5.8<H>       Mg     4.4<H>       Mg     2.2       Mg     2.4       Mg     2.2         TPro  5.7<L>  /  Alb  2.8<L>  /  TBili  0.4  /  DBili  x   /  AST  21  /  ALT  7<L>  /  AlkPhos  149<H>  23 @ 11:06  TPro  6.6  /  Alb  3.1<L>  /  TBili  0.4  /  DBili  x   /  AST  32  /  ALT  10  /  AlkPhos  175<H>  23 @ 01:47  TPro  6.5  /  Alb  3.1<L>  /  TBili  0.3  /  DBili  x   /  AST  19  /  ALT  9<L>  /  AlkPhos  171<H>  23 @ 18:30  TPro  6.4  /  Alb  3.1<L>  /  TBili  0.4  /  DBili  x   /  AST  17  /  ALT  8<L>  /  AlkPhos  164<H>  23 @ 11:43  TPro  6.3  /  Alb  3.2<L>  /  TBili  0.3  /  DBili  x   /  AST  20  /  ALT  9<L>  /  AlkPhos  161<H>  23 @ 05:51  TPro  6.2  /  Alb  3.1<L>  /  TBili  0.2  /  DBili  x   /  AST  17  /  ALT  8<L>  /  AlkPhos  155<H>  23 @ 22:50  TPro  6.5  /  Alb  3.3  /  TBili  0.2  /  DBili  x   /  AST  18  /  ALT  10  /  AlkPhos  162<H>  23 @ 16:54          Physical Exam:  General: NAD  Abdomen: soft, non-tender, non-distended, fundus firm  Vaginal: No heavy vaginal bleeding  Extremities: No erythema/edema

## 2023-01-08 NOTE — PROGRESS NOTE ADULT - ATTENDING COMMENTS
33yo PPD1 s/p  at 35+6 2/2 worsening siPEC and SLE nephritis, currently on MgSO4. No acute issues o/n. Symptoms controlled; no worsening PEC symptoms. Moderate lochia. VSS. MgSO4 to be d/c this morning. Will continue to monitor BPs off MgSO4; currently NT. AM labs pending. Continue Plaquenil for SLE nephritis. Continue inpatient management.   MD Derrek    ICU Vital Signs Last 24 Hrs  T(C): 36.5 (2023 06:00), Max: 37.1 (2023 08:20)  T(F): 97.7 (2023 06:00), Max: 98.8 (2023 08:20)  HR: 68 (2023 06:00) (60 - 108)  BP: 109/70 (2023 06:00) (100/67 - 132/64)  RR: 18 (2023 06:00) (16 - 18)  SpO2: 97% (2023 06:00) (94% - 100%)                          8.7    11.76 )-----------( 138      ( 2023 11:06 )             25.2

## 2023-01-08 NOTE — PROGRESS NOTE ADULT - ASSESSMENT
A/P: 31yo PPD#2 s/p  c/b siPEC and lupus nephritis.  Patient is clinically stable. BPs well controlled on amlodipine and labetalol.    #siPEC  -c/w amlodipine and labetalol  -c/w Mg for seizure ppx  -HELLP labs wnl f/u AM HELLP labs  -q6h Mg levels     #Lupus nephritis  -c/w plaquenil  -Cr mildly uptrendings; baseline 1.4; uptrending to 1.8  -nephrology recs appreciated    #PPD  - Pain well controlled, continue current pain regimen  - Increase ambulation, SCDs when not ambulating; c/w HSQ  - Continue regular diet    Gerardo PGY3 A/P: 33yo PPD#1 s/p  c/b siPEC and lupus nephritis.  Patient is clinically stable. BPs well controlled on amlodipine and labetalol.    #siPEC  -c/w amlodipine and labetalol  -c/w Mg for seizure ppx  -HELLP labs wnl f/u AM HELLP labs  -q6h Mg levels     #Lupus nephritis  -c/w plaquenil  -Cr mildly uptrendings; baseline 1.4; uptrending to 1.8  -nephrology recs appreciated    #PPD  - Pain well controlled, continue current pain regimen  - Increase ambulation, SCDs when not ambulating; c/w HSQ  - Continue regular diet    Gerardo PGY3

## 2023-01-08 NOTE — PROGRESS NOTE ADULT - ATTENDING COMMENTS
Sent for worsening renal fxn, possible superimposed PEC; lupus  s/p vaginal delivery    #CKD3- with ama with recent rise in cr to 1.6 to 1.8 to 2.0 today  hemodynamic BP fluctuations/post partum equilibration occurring   monitor cr trend  no nsaids  if cr uptrends today further consider trial of IVF NS@75cc/hr   #lupus//lupus nephritis  cont imuran  #pec      mg drip now off  monitor PEC /HELLPS labs (platelets and hb fluctuating)  #HTN- labetalol and amlodipine; monitor bp; can DC labetolol and monitor BP  #anemia- monitor trend

## 2023-01-09 PROBLEM — M32.14 GLOMERULAR DISEASE IN SYSTEMIC LUPUS ERYTHEMATOSUS: Chronic | Status: ACTIVE | Noted: 2023-01-05

## 2023-01-09 LAB
ALBUMIN SERPL ELPH-MCNC: 2.5 G/DL — LOW (ref 3.3–5)
ALBUMIN SERPL ELPH-MCNC: 2.7 G/DL — LOW (ref 3.3–5)
ALP SERPL-CCNC: 109 U/L — SIGNIFICANT CHANGE UP (ref 40–120)
ALP SERPL-CCNC: 114 U/L — SIGNIFICANT CHANGE UP (ref 40–120)
ALT FLD-CCNC: 7 U/L — LOW (ref 10–45)
ALT FLD-CCNC: 9 U/L — LOW (ref 10–45)
ANION GAP SERPL CALC-SCNC: 10 MMOL/L — SIGNIFICANT CHANGE UP (ref 5–17)
ANION GAP SERPL CALC-SCNC: 11 MMOL/L — SIGNIFICANT CHANGE UP (ref 5–17)
ANION GAP SERPL CALC-SCNC: 11 MMOL/L — SIGNIFICANT CHANGE UP (ref 5–17)
APTT BLD: 29 SEC — SIGNIFICANT CHANGE UP (ref 27.5–35.5)
APTT BLD: 31 SEC — SIGNIFICANT CHANGE UP (ref 27.5–35.5)
AST SERPL-CCNC: 18 U/L — SIGNIFICANT CHANGE UP (ref 10–40)
AST SERPL-CCNC: 19 U/L — SIGNIFICANT CHANGE UP (ref 10–40)
BASOPHILS # BLD AUTO: 0.01 K/UL — SIGNIFICANT CHANGE UP (ref 0–0.2)
BASOPHILS # BLD AUTO: 0.03 K/UL — SIGNIFICANT CHANGE UP (ref 0–0.2)
BASOPHILS NFR BLD AUTO: 0.1 % — SIGNIFICANT CHANGE UP (ref 0–2)
BASOPHILS NFR BLD AUTO: 0.3 % — SIGNIFICANT CHANGE UP (ref 0–2)
BILIRUB SERPL-MCNC: 0.1 MG/DL — LOW (ref 0.2–1.2)
BILIRUB SERPL-MCNC: 0.1 MG/DL — LOW (ref 0.2–1.2)
BLD GP AB SCN SERPL QL: NEGATIVE — SIGNIFICANT CHANGE UP
BUN SERPL-MCNC: 24 MG/DL — HIGH (ref 7–23)
BUN SERPL-MCNC: 25 MG/DL — HIGH (ref 7–23)
BUN SERPL-MCNC: 25 MG/DL — HIGH (ref 7–23)
CALCIUM SERPL-MCNC: 8.2 MG/DL — LOW (ref 8.4–10.5)
CALCIUM SERPL-MCNC: 8.3 MG/DL — LOW (ref 8.4–10.5)
CALCIUM SERPL-MCNC: 8.3 MG/DL — LOW (ref 8.4–10.5)
CHLORIDE SERPL-SCNC: 102 MMOL/L — SIGNIFICANT CHANGE UP (ref 96–108)
CHLORIDE SERPL-SCNC: 104 MMOL/L — SIGNIFICANT CHANGE UP (ref 96–108)
CHLORIDE SERPL-SCNC: 104 MMOL/L — SIGNIFICANT CHANGE UP (ref 96–108)
CO2 SERPL-SCNC: 22 MMOL/L — SIGNIFICANT CHANGE UP (ref 22–31)
CO2 SERPL-SCNC: 24 MMOL/L — SIGNIFICANT CHANGE UP (ref 22–31)
CO2 SERPL-SCNC: 24 MMOL/L — SIGNIFICANT CHANGE UP (ref 22–31)
CREAT SERPL-MCNC: 1.79 MG/DL — HIGH (ref 0.5–1.3)
CREAT SERPL-MCNC: 1.9 MG/DL — HIGH (ref 0.5–1.3)
CREAT SERPL-MCNC: 1.94 MG/DL — HIGH (ref 0.5–1.3)
EGFR: 35 ML/MIN/1.73M2 — LOW
EGFR: 36 ML/MIN/1.73M2 — LOW
EGFR: 39 ML/MIN/1.73M2 — LOW
EOSINOPHIL # BLD AUTO: 0.15 K/UL — SIGNIFICANT CHANGE UP (ref 0–0.5)
EOSINOPHIL # BLD AUTO: 0.24 K/UL — SIGNIFICANT CHANGE UP (ref 0–0.5)
EOSINOPHIL NFR BLD AUTO: 1.7 % — SIGNIFICANT CHANGE UP (ref 0–6)
EOSINOPHIL NFR BLD AUTO: 2.7 % — SIGNIFICANT CHANGE UP (ref 0–6)
FERRITIN SERPL-MCNC: 113 NG/ML — SIGNIFICANT CHANGE UP (ref 15–150)
FIBRINOGEN PPP-MCNC: 671 MG/DL — HIGH (ref 200–445)
FIBRINOGEN PPP-MCNC: 698 MG/DL — HIGH (ref 200–445)
GLUCOSE SERPL-MCNC: 122 MG/DL — HIGH (ref 70–99)
GLUCOSE SERPL-MCNC: 82 MG/DL — SIGNIFICANT CHANGE UP (ref 70–99)
GLUCOSE SERPL-MCNC: 92 MG/DL — SIGNIFICANT CHANGE UP (ref 70–99)
HCT VFR BLD CALC: 20.1 % — CRITICAL LOW (ref 34.5–45)
HCT VFR BLD CALC: 20.9 % — CRITICAL LOW (ref 34.5–45)
HCT VFR BLD CALC: 23.5 % — LOW (ref 34.5–45)
HGB BLD-MCNC: 6.7 G/DL — CRITICAL LOW (ref 11.5–15.5)
HGB BLD-MCNC: 6.7 G/DL — CRITICAL LOW (ref 11.5–15.5)
HGB BLD-MCNC: 8.1 G/DL — LOW (ref 11.5–15.5)
IMM GRANULOCYTES NFR BLD AUTO: 1 % — HIGH (ref 0–0.9)
IMM GRANULOCYTES NFR BLD AUTO: 1 % — HIGH (ref 0–0.9)
INR BLD: 0.84 RATIO — LOW (ref 0.88–1.16)
INR BLD: 0.85 RATIO — LOW (ref 0.88–1.16)
IRON SATN MFR SERPL: 12 % — LOW (ref 14–50)
IRON SATN MFR SERPL: 38 UG/DL — SIGNIFICANT CHANGE UP (ref 30–160)
LDH SERPL L TO P-CCNC: 302 U/L — HIGH (ref 50–242)
LDH SERPL L TO P-CCNC: 306 U/L — HIGH (ref 50–242)
LYMPHOCYTES # BLD AUTO: 1.08 K/UL — SIGNIFICANT CHANGE UP (ref 1–3.3)
LYMPHOCYTES # BLD AUTO: 1.3 K/UL — SIGNIFICANT CHANGE UP (ref 1–3.3)
LYMPHOCYTES # BLD AUTO: 12.4 % — LOW (ref 13–44)
LYMPHOCYTES # BLD AUTO: 14.5 % — SIGNIFICANT CHANGE UP (ref 13–44)
MCHC RBC-ENTMCNC: 32.1 GM/DL — SIGNIFICANT CHANGE UP (ref 32–36)
MCHC RBC-ENTMCNC: 32.2 PG — SIGNIFICANT CHANGE UP (ref 27–34)
MCHC RBC-ENTMCNC: 32.8 PG — SIGNIFICANT CHANGE UP (ref 27–34)
MCHC RBC-ENTMCNC: 33.2 PG — SIGNIFICANT CHANGE UP (ref 27–34)
MCHC RBC-ENTMCNC: 33.3 GM/DL — SIGNIFICANT CHANGE UP (ref 32–36)
MCHC RBC-ENTMCNC: 34.5 GM/DL — SIGNIFICANT CHANGE UP (ref 32–36)
MCV RBC AUTO: 100.5 FL — HIGH (ref 80–100)
MCV RBC AUTO: 95.1 FL — SIGNIFICANT CHANGE UP (ref 80–100)
MCV RBC AUTO: 99.5 FL — SIGNIFICANT CHANGE UP (ref 80–100)
MONOCYTES # BLD AUTO: 0.27 K/UL — SIGNIFICANT CHANGE UP (ref 0–0.9)
MONOCYTES # BLD AUTO: 0.39 K/UL — SIGNIFICANT CHANGE UP (ref 0–0.9)
MONOCYTES NFR BLD AUTO: 3.1 % — SIGNIFICANT CHANGE UP (ref 2–14)
MONOCYTES NFR BLD AUTO: 4.3 % — SIGNIFICANT CHANGE UP (ref 2–14)
NEUTROPHILS # BLD AUTO: 6.94 K/UL — SIGNIFICANT CHANGE UP (ref 1.8–7.4)
NEUTROPHILS # BLD AUTO: 7.06 K/UL — SIGNIFICANT CHANGE UP (ref 1.8–7.4)
NEUTROPHILS NFR BLD AUTO: 77.4 % — HIGH (ref 43–77)
NEUTROPHILS NFR BLD AUTO: 81.5 % — HIGH (ref 43–77)
NRBC # BLD: 0 /100 WBCS — SIGNIFICANT CHANGE UP (ref 0–0)
PLATELET # BLD AUTO: 149 K/UL — LOW (ref 150–400)
PLATELET # BLD AUTO: 159 K/UL — SIGNIFICANT CHANGE UP (ref 150–400)
PLATELET # BLD AUTO: 176 K/UL — SIGNIFICANT CHANGE UP (ref 150–400)
POTASSIUM SERPL-MCNC: 4.1 MMOL/L — SIGNIFICANT CHANGE UP (ref 3.5–5.3)
POTASSIUM SERPL-MCNC: 4.3 MMOL/L — SIGNIFICANT CHANGE UP (ref 3.5–5.3)
POTASSIUM SERPL-MCNC: 4.3 MMOL/L — SIGNIFICANT CHANGE UP (ref 3.5–5.3)
POTASSIUM SERPL-SCNC: 4.1 MMOL/L — SIGNIFICANT CHANGE UP (ref 3.5–5.3)
POTASSIUM SERPL-SCNC: 4.3 MMOL/L — SIGNIFICANT CHANGE UP (ref 3.5–5.3)
POTASSIUM SERPL-SCNC: 4.3 MMOL/L — SIGNIFICANT CHANGE UP (ref 3.5–5.3)
PROT SERPL-MCNC: 5.4 G/DL — LOW (ref 6–8.3)
PROT SERPL-MCNC: 5.4 G/DL — LOW (ref 6–8.3)
PROTHROM AB SERPL-ACNC: 9.7 SEC — LOW (ref 10.5–13.4)
PROTHROM AB SERPL-ACNC: 9.9 SEC — LOW (ref 10.5–13.4)
RBC # BLD: 2.02 M/UL — LOW (ref 3.8–5.2)
RBC # BLD: 2.08 M/UL — LOW (ref 3.8–5.2)
RBC # BLD: 2.47 M/UL — LOW (ref 3.8–5.2)
RBC # FLD: 14.4 % — SIGNIFICANT CHANGE UP (ref 10.3–14.5)
RBC # FLD: 14.6 % — HIGH (ref 10.3–14.5)
RBC # FLD: 15.9 % — HIGH (ref 10.3–14.5)
RH IG SCN BLD-IMP: POSITIVE — SIGNIFICANT CHANGE UP
SODIUM SERPL-SCNC: 136 MMOL/L — SIGNIFICANT CHANGE UP (ref 135–145)
SODIUM SERPL-SCNC: 137 MMOL/L — SIGNIFICANT CHANGE UP (ref 135–145)
SODIUM SERPL-SCNC: 139 MMOL/L — SIGNIFICANT CHANGE UP (ref 135–145)
TIBC SERPL-MCNC: 311 UG/DL — SIGNIFICANT CHANGE UP (ref 220–430)
TRANSFERRIN SERPL-MCNC: 250 MG/DL — SIGNIFICANT CHANGE UP (ref 200–360)
UIBC SERPL-MCNC: 273 UG/DL — SIGNIFICANT CHANGE UP (ref 110–370)
URATE SERPL-MCNC: 9.6 MG/DL — HIGH (ref 2.5–7)
URATE SERPL-MCNC: 9.7 MG/DL — HIGH (ref 2.5–7)
WBC # BLD: 10.54 K/UL — HIGH (ref 3.8–10.5)
WBC # BLD: 8.68 K/UL — SIGNIFICANT CHANGE UP (ref 3.8–10.5)
WBC # BLD: 8.97 K/UL — SIGNIFICANT CHANGE UP (ref 3.8–10.5)
WBC # FLD AUTO: 10.54 K/UL — HIGH (ref 3.8–10.5)
WBC # FLD AUTO: 8.68 K/UL — SIGNIFICANT CHANGE UP (ref 3.8–10.5)
WBC # FLD AUTO: 8.97 K/UL — SIGNIFICANT CHANGE UP (ref 3.8–10.5)

## 2023-01-09 PROCEDURE — 99233 SBSQ HOSP IP/OBS HIGH 50: CPT | Mod: GC

## 2023-01-09 RX ORDER — LACTULOSE 10 G/15ML
10 SOLUTION ORAL ONCE
Refills: 0 | Status: DISCONTINUED | OUTPATIENT
Start: 2023-01-09 | End: 2023-01-10

## 2023-01-09 RX ADMIN — HEPARIN SODIUM 5000 UNIT(S): 5000 INJECTION INTRAVENOUS; SUBCUTANEOUS at 09:38

## 2023-01-09 RX ADMIN — Medication 500 MILLIGRAM(S): at 12:51

## 2023-01-09 RX ADMIN — Medication 975 MILLIGRAM(S): at 03:03

## 2023-01-09 RX ADMIN — Medication 325 MILLIGRAM(S): at 12:53

## 2023-01-09 RX ADMIN — Medication 200 MILLIGRAM(S): at 20:19

## 2023-01-09 RX ADMIN — Medication 975 MILLIGRAM(S): at 21:19

## 2023-01-09 RX ADMIN — Medication 975 MILLIGRAM(S): at 04:03

## 2023-01-09 RX ADMIN — Medication 10 MILLIGRAM(S): at 01:08

## 2023-01-09 RX ADMIN — Medication 975 MILLIGRAM(S): at 09:26

## 2023-01-09 RX ADMIN — PANTOPRAZOLE SODIUM 20 MILLIGRAM(S): 20 TABLET, DELAYED RELEASE ORAL at 10:41

## 2023-01-09 RX ADMIN — Medication 975 MILLIGRAM(S): at 15:19

## 2023-01-09 RX ADMIN — Medication 975 MILLIGRAM(S): at 10:38

## 2023-01-09 RX ADMIN — SODIUM CHLORIDE 3 MILLILITER(S): 9 INJECTION INTRAMUSCULAR; INTRAVENOUS; SUBCUTANEOUS at 20:08

## 2023-01-09 RX ADMIN — SENNA PLUS 1 TABLET(S): 8.6 TABLET ORAL at 12:51

## 2023-01-09 RX ADMIN — AZATHIOPRINE 100 MILLIGRAM(S): 100 TABLET ORAL at 20:19

## 2023-01-09 RX ADMIN — SODIUM CHLORIDE 3 MILLILITER(S): 9 INJECTION INTRAMUSCULAR; INTRAVENOUS; SUBCUTANEOUS at 13:28

## 2023-01-09 RX ADMIN — POLYETHYLENE GLYCOL 3350 17 GRAM(S): 17 POWDER, FOR SOLUTION ORAL at 12:52

## 2023-01-09 RX ADMIN — Medication 1 TABLET(S): at 12:51

## 2023-01-09 RX ADMIN — Medication 975 MILLIGRAM(S): at 20:19

## 2023-01-09 NOTE — PROGRESS NOTE ADULT - SUBJECTIVE AND OBJECTIVE BOX
R3 Progress Note   PPD#2   HD#5     Patient seen and examined at bedside, no acute overnight events. No acute complaints, pain well controlled. Patient is ambulating, voiding spontaneously, passing gas, and tolerating regular diet. Had constipation overnight, now improved s/p suppository. Denies CP, SOB, N/V, HA, blurred vision, epigastric pain.    Vital Signs Last 24 Hours  T(C): 36.8 (01-09-23 @ 00:26), Max: 37 (01-08-23 @ 17:34)  HR: 83 (01-09-23 @ 00:26) (68 - 88)  BP: 120/80 (01-09-23 @ 00:26) (109/70 - 122/85)  RR: 18 (01-09-23 @ 00:26) (18 - 18)  SpO2: 98% (01-09-23 @ 00:26) (97% - 99%)    Physical Exam:  General: NAD  Abdomen: Soft, non-tender, non-distended, fundus firm  Pelvic: Lochia wnl    Labs:    Blood Type: B Positive  Antibody Screen: Negative  RPR: Negative               7.3    9.95  )-----------( 171      ( 01-08 @ 11:28 )             21.8                7.1    9.22  )-----------( 124      ( 01-08 @ 06:29 )             20.9                8.7    11.76 )-----------( 138      ( 01-07 @ 11:06 )             25.2         MEDICATIONS  (STANDING):  acetaminophen     Tablet .. 975 milliGRAM(s) Oral <User Schedule>  amLODIPine   Tablet 10 milliGRAM(s) Oral daily  ascorbic acid 500 milliGRAM(s) Oral daily  azaTHIOprine 100 milliGRAM(s) Oral daily  diphtheria/tetanus/pertussis (acellular) Vaccine (Adacel) 0.5 milliLiter(s) IntraMuscular once  ferrous    sulfate 325 milliGRAM(s) Oral daily  heparin   Injectable 5000 Unit(s) SubCutaneous every 12 hours  hydroxychloroquine 200 milliGRAM(s) Oral daily  labetalol 100 milliGRAM(s) Oral daily  oxytocin Infusion 333.333 milliUNIT(s)/Min (1000 mL/Hr) IV Continuous <Continuous>  oxytocin Infusion 333.333 milliUNIT(s)/Min (1000 mL/Hr) IV Continuous <Continuous>  oxytocin Infusion. 2 milliUNIT(s)/Min (2 mL/Hr) IV Continuous <Continuous>  pantoprazole    Tablet 20 milliGRAM(s) Oral before breakfast  polyethylene glycol 3350 17 Gram(s) Oral daily  prenatal multivitamin 1 Tablet(s) Oral daily  senna 1 Tablet(s) Oral daily  sodium chloride 0.9% lock flush 3 milliLiter(s) IV Push every 8 hours    MEDICATIONS  (PRN):  benzocaine 20%/menthol 0.5% Spray 1 Spray(s) Topical every 6 hours PRN for Perineal discomfort  bisacodyl Suppository 10 milliGRAM(s) Rectal daily PRN Constipation  dibucaine 1% Ointment 1 Application(s) Topical every 6 hours PRN Perineal discomfort  diphenhydrAMINE 25 milliGRAM(s) Oral every 6 hours PRN Pruritus  hydrocortisone 1% Cream 1 Application(s) Topical every 6 hours PRN Moderate Pain (4-6)  lanolin Ointment 1 Application(s) Topical every 6 hours PRN nipple soreness  magnesium hydroxide Suspension 30 milliLiter(s) Oral two times a day PRN Constipation  oxyCODONE    IR 5 milliGRAM(s) Oral every 3 hours PRN Severe Pain (7 - 10)  pramoxine 1%/zinc 5% Cream 1 Application(s) Topical every 4 hours PRN Moderate Pain (4-6)  simethicone 80 milliGRAM(s) Chew every 4 hours PRN Gas  witch hazel Pads 1 Application(s) Topical every 4 hours PRN Perineal discomfort   R3 Progress Note   PPD#2   HD#5     Patient seen and examined at bedside, no acute overnight events. No acute complaints, pain well controlled. Patient is ambulating, voiding spontaneously, passing gas, and tolerating regular diet. Had constipation overnight, reports she was able to have small bowel movement last night but that it was "hard and belem." Denies CP, SOB, N/V, HA, blurred vision, epigastric pain.    Vital Signs Last 24 Hours  T(C): 36.8 (01-09-23 @ 00:26), Max: 37 (01-08-23 @ 17:34)  HR: 83 (01-09-23 @ 00:26) (68 - 88)  BP: 120/80 (01-09-23 @ 00:26) (109/70 - 122/85)  RR: 18 (01-09-23 @ 00:26) (18 - 18)  SpO2: 98% (01-09-23 @ 00:26) (97% - 99%)    Physical Exam:  General: NAD  Abdomen: Soft, non-tender, non-distended, fundus firm  Pelvic: Lochia wnl    Labs:    Blood Type: B Positive  Antibody Screen: Negative  RPR: Negative               7.3    9.95  )-----------( 171      ( 01-08 @ 11:28 )             21.8                7.1    9.22  )-----------( 124      ( 01-08 @ 06:29 )             20.9                8.7    11.76 )-----------( 138      ( 01-07 @ 11:06 )             25.2         MEDICATIONS  (STANDING):  acetaminophen     Tablet .. 975 milliGRAM(s) Oral <User Schedule>  amLODIPine   Tablet 10 milliGRAM(s) Oral daily  ascorbic acid 500 milliGRAM(s) Oral daily  azaTHIOprine 100 milliGRAM(s) Oral daily  diphtheria/tetanus/pertussis (acellular) Vaccine (Adacel) 0.5 milliLiter(s) IntraMuscular once  ferrous    sulfate 325 milliGRAM(s) Oral daily  heparin   Injectable 5000 Unit(s) SubCutaneous every 12 hours  hydroxychloroquine 200 milliGRAM(s) Oral daily  labetalol 100 milliGRAM(s) Oral daily  oxytocin Infusion 333.333 milliUNIT(s)/Min (1000 mL/Hr) IV Continuous <Continuous>  oxytocin Infusion 333.333 milliUNIT(s)/Min (1000 mL/Hr) IV Continuous <Continuous>  oxytocin Infusion. 2 milliUNIT(s)/Min (2 mL/Hr) IV Continuous <Continuous>  pantoprazole    Tablet 20 milliGRAM(s) Oral before breakfast  polyethylene glycol 3350 17 Gram(s) Oral daily  prenatal multivitamin 1 Tablet(s) Oral daily  senna 1 Tablet(s) Oral daily  sodium chloride 0.9% lock flush 3 milliLiter(s) IV Push every 8 hours    MEDICATIONS  (PRN):  benzocaine 20%/menthol 0.5% Spray 1 Spray(s) Topical every 6 hours PRN for Perineal discomfort  bisacodyl Suppository 10 milliGRAM(s) Rectal daily PRN Constipation  dibucaine 1% Ointment 1 Application(s) Topical every 6 hours PRN Perineal discomfort  diphenhydrAMINE 25 milliGRAM(s) Oral every 6 hours PRN Pruritus  hydrocortisone 1% Cream 1 Application(s) Topical every 6 hours PRN Moderate Pain (4-6)  lanolin Ointment 1 Application(s) Topical every 6 hours PRN nipple soreness  magnesium hydroxide Suspension 30 milliLiter(s) Oral two times a day PRN Constipation  oxyCODONE    IR 5 milliGRAM(s) Oral every 3 hours PRN Severe Pain (7 - 10)  pramoxine 1%/zinc 5% Cream 1 Application(s) Topical every 4 hours PRN Moderate Pain (4-6)  simethicone 80 milliGRAM(s) Chew every 4 hours PRN Gas  witch hazel Pads 1 Application(s) Topical every 4 hours PRN Perineal discomfort

## 2023-01-09 NOTE — PROGRESS NOTE ADULT - ATTENDING COMMENTS
pt seen and examined. agree with above.  PPD #2, s/p  after IOL for siPEC, now with KATIE and sx anemia  VSS  FF and below umb  acute blood loss on chronic anemia. Transfuse 1u pRBC. follow up H&H and Cr and sx. d/w renal (dr. vanegas)  BPs within range on home regimen of amlodipien and labetalol  cont plaquenil  pain controlled with po pain meds prn.  ambulation encouraged.  baby at bedside, breast/bottle feeding  monitor sx. possible discharge late PM vs tomorrow.

## 2023-01-09 NOTE — PROGRESS NOTE ADULT - SUBJECTIVE AND OBJECTIVE BOX
[facility] Division of Kidney Diseases & Hypertension  FOLLOW UP NOTE  [phone]--------------------------------------------------------------------------------  Chief Complaint:Other pregnancy-related conditions, antepartum    Other pregnancy-related conditions, antepartum    Supervision of other normal pregnancy, antepartum        24 hour events/subjective: Patient seen & examined. Labs & vitals reviewed. Feels very tired & winded. Hg downtrending to 6.7 today. SCr stable at 1.9      PAST HISTORY  --------------------------------------------------------------------------------  No significant changes to PMH, PSH, FHx, SHx, unless otherwise noted    ALLERGIES & MEDICATIONS  --------------------------------------------------------------------------------  Allergies    No Known Allergies    Intolerances      Standing Inpatient Medications  acetaminophen     Tablet .. 975 milliGRAM(s) Oral <User Schedule>  amLODIPine   Tablet 10 milliGRAM(s) Oral daily  ascorbic acid 500 milliGRAM(s) Oral daily  azaTHIOprine 100 milliGRAM(s) Oral daily  diphtheria/tetanus/pertussis (acellular) Vaccine (Adacel) 0.5 milliLiter(s) IntraMuscular once  ferrous    sulfate 325 milliGRAM(s) Oral daily  heparin   Injectable 5000 Unit(s) SubCutaneous every 12 hours  hydroxychloroquine 200 milliGRAM(s) Oral daily  lactulose Syrup 10 Gram(s) Oral once  oxytocin Infusion 333.333 milliUNIT(s)/Min IV Continuous <Continuous>  oxytocin Infusion 333.333 milliUNIT(s)/Min IV Continuous <Continuous>  oxytocin Infusion. 2 milliUNIT(s)/Min IV Continuous <Continuous>  pantoprazole    Tablet 20 milliGRAM(s) Oral before breakfast  polyethylene glycol 3350 17 Gram(s) Oral daily  prenatal multivitamin 1 Tablet(s) Oral daily  senna 1 Tablet(s) Oral daily  sodium chloride 0.9% lock flush 3 milliLiter(s) IV Push every 8 hours    PRN Inpatient Medications  benzocaine 20%/menthol 0.5% Spray 1 Spray(s) Topical every 6 hours PRN  bisacodyl Suppository 10 milliGRAM(s) Rectal daily PRN  dibucaine 1% Ointment 1 Application(s) Topical every 6 hours PRN  diphenhydrAMINE 25 milliGRAM(s) Oral every 6 hours PRN  hydrocortisone 1% Cream 1 Application(s) Topical every 6 hours PRN  lanolin Ointment 1 Application(s) Topical every 6 hours PRN  magnesium hydroxide Suspension 30 milliLiter(s) Oral two times a day PRN  oxyCODONE    IR 5 milliGRAM(s) Oral every 3 hours PRN  pramoxine 1%/zinc 5% Cream 1 Application(s) Topical every 4 hours PRN  simethicone 80 milliGRAM(s) Chew every 4 hours PRN  witch hazel Pads 1 Application(s) Topical every 4 hours PRN      REVIEW OF SYSTEMS  --------------------------------------------------------------------------------  Gen: No chills, +fatigue  Respiratory: No dyspnea, cough  CV: No chest pain  GI: No abdominal pain, diarrhea,  nausea, vomiting  : No increased frequency, dysuria, hematuria  MSK:  no edema  Neuro: No dizziness/lightheadedness      All other systems were reviewed and are negative, except as noted.    VITALS/PHYSICAL EXAM  --------------------------------------------------------------------------------  T(C): 36.6 (01-09-23 @ 12:52), Max: 37 (01-08-23 @ 17:34)  HR: 98 (01-09-23 @ 12:52) (73 - 98)  BP: 102/66 (01-09-23 @ 12:52) (102/66 - 122/85)  RR: 16 (01-09-23 @ 12:52) (16 - 18)  SpO2: 98% (01-09-23 @ 12:52) (96% - 99%)  Wt(kg): --        01-08-23 @ 07:01  -  01-09-23 @ 07:00  --------------------------------------------------------  IN: 0 mL / OUT: 4600 mL / NET: -4600 mL      Physical Exam:  	Gen: NAD  	HEENT: Anicteric, MMM, no JVD  	Pulm: CTA B/L  	CV: S1S2, no rub  	Abd: Soft, +BS  	Ext: trace LE edema B/L  	Neuro: Awake, alert  	Skin: Warm and dry  	Vascular access:        LABS/STUDIES  --------------------------------------------------------------------------------              6.7    8.97  >-----------<  159      [01-09-23 @ 13:20]              20.9     139  |  104  |  25  ----------------------------<  92      [01-09-23 @ 06:31]  4.3   |  24  |  1.94        Ca     8.2     [01-09-23 @ 06:31]      Mg     6.7     [01-08-23 @ 06:29]    TPro  5.4  /  Alb  2.5  /  TBili  0.1  /  DBili  x   /  AST  18  /  ALT  7   /  AlkPhos  109  [01-09-23 @ 06:31]    PT/INR: PT 9.7  , INR 0.84       [01-09-23 @ 06:34]  PTT: 29.0       [01-09-23 @ 06:34]    Uric acid 9.7      [01-09-23 @ 06:31]        [01-09-23 @ 06:31]    Creatinine Trend:  SCr 1.94 [01-09 @ 06:31]  SCr 2.07 [01-08 @ 11:28]  SCr 2.01 [01-08 @ 06:30]  SCr 1.81 [01-07 @ 11:06]  SCr 1.68 [01-07 @ 01:47]    Urinalysis - [01-05-23 @ 16:11]      Color Light Yellow / Appearance Clear / SG 1.008 / pH 6.5      Gluc Negative / Ketone Negative  / Bili Negative / Urobili Negative       Blood Trace / Protein 100 mg/dl / Leuk Est Negative / Nitrite Negative      RBC 1 / WBC 1 / Hyaline  / Gran  / Sq Epi  / Non Sq Epi 1 / Bacteria Negative    Urine Creatinine 55      [01-05-23 @ 16:34]  Urine Protein 68      [01-05-23 @ 16:34]        Syphilis Screen (Treponema Pallidum Ab) Negative      [01-05-23 @ 16:11]

## 2023-01-09 NOTE — PROGRESS NOTE ADULT - ASSESSMENT
33yo PPD#2 s/p ,  c/b siPEC and lupus nephritis.  Patient is clinically stable. BPs well controlled on amlodipine and labetalol.    #siPEC  -c/w amlodipine and labetalol  -s/p Mg 24 hr postpartum for seizure prophylaxis (-)  -continue to trend Cr (1.68->1.81->2.01->2.07), otherwise HELLP wnl  -Monitor BP     #Lupus nephritis  -c/w Plaquenil  -Appreciate Nephrology recs: consider trial of IVF NS @75cc/hr if Cr continues to uptrend, can dc labetalol and monitor BP     #Postpartum  - Pain well controlled, continue current pain regimen  - Increase ambulation, SCDs when not ambulating; c/w HSQ  - Continue regular diet    Melina Nj PGY-3 33yo PPD#2 s/p ,  c/b siPEC and lupus nephritis.  Patient is clinically stable. BPs well controlled on amlodipine and labetalol.    #siPEC  -c/w amlodipine and labetalol  -s/p Mg 24 hr postpartum for seizure prophylaxis (-)  -continue to trend Cr (1.68->1.81->2.01->2.07), otherwise HELLP wnl  -Monitor BP     #Lupus nephritis  -c/w Plaquenil  -Appreciate Nephrology recs: consider trial of IVF NS @75cc/hr if Cr continues to uptrend, can dc labetalol and monitor BP     #Postpartum  - Pain well controlled, continue current pain regimen  - Bowel regimen  - Increase ambulation, SCDs when not ambulating; c/w HSQ  - Continue regular diet    Melina Nj PGY-3

## 2023-01-09 NOTE — PROGRESS NOTE ADULT - NUTRITIONAL ASSESSMENT
s/p vaginal delivery    #CKD3- with ama with recent rise in cr to 1.6 to 1.8 to 2.0 today  hemodynamic BP fluctuations/post partum equilibration occurring   monitor cr trend  no nsaids  if cr uptrends today further consider trial of IVF NS@75cc/hr   #lupus//lupus nephritis  cont imuran  #pec      mg drip now off  monitor PEC /HELLPS labs (platelets and hb fluctuating)  #HTN- labetalol and amlodipine; monitor bp; can DC labetolol and monitor BP  #anemia- monitor trend.

## 2023-01-09 NOTE — PROGRESS NOTE ADULT - PROBLEM SELECTOR PLAN 1
s/p vaginal delivery  Improvement in HTN noted  Would hold labetalol & continue with norvasc. If remains SBP < 100 then would consider discontinuing norvasc too  SCr 1.6--> 1.8-->2-->1.9  No NSAIDs  Compliments not low so less likely lupus flair.  Platelet up & down but LFTs wnl so unlikely HELLP at this time  Repeat C3, C4, ds-DNA  Monitor HELLP & PEC labs q6hrs  Mg drip now off. Monitor levels carefully as go up to toxic levels in CKD pts.

## 2023-01-09 NOTE — PROGRESS NOTE ADULT - ATTENDING COMMENTS
Sent for worsening renal fxn, possible superimposed PEC; lupus  s/p vaginal delivery    #CKD3- with ama with recent rise in cr to 1.6 to 1.8 to 2.0, now 1.9 and stable  hemodynamic BP fluctuations/post partum equilibration occurring   monitor cr trend  no nsaids  #lupus//lupus nephritis  cont imuran  #pec      mg drip now off  monitor PEC /HELLPS labs (platelets and hb fluctuating)  #HTN- labetalol and amlodipine; monitor bp; can DC labetolol and monitor BP  #anemia- monitor trend  pt is symptomatic today; +lightheaded and dizzy with walking  plan PRBC per OB   d.w OB

## 2023-01-09 NOTE — PROGRESS NOTE ADULT - PROBLEM SELECTOR PLAN 2
Continue Imuran 100mg daily and Plaquenil 200 daily.  Compliments not low so less likely lupus flair.   Repeat C3, C4, ds-DNA      Anemia- Dropping Hg, Last hapto low & LDH high. ?low grade hemolysis from chronic SLE. Would repeat LDH, hapto, iron studies, folate, B12, retic. CKD 3 2/2 LN  SCr 1.6--> 1.8-->2-->1.9  Continue Imuran 100mg daily and Plaquenil 200 daily.  Compliments not low so less likely lupus flair.   Repeat C3, C4, ds-DNA  UP/C ratio trended up to 1.2 (baseline 0.3). Will repeat as outpatient   Avoid NSAIDs      Anemia- Dropping Hg, Last hapto low & LDH high. ?low grade hemolysis from chronic SLE. Would repeat LDH, hapto, iron studies, folate, B12, retic. WIll need to be transfused today. Consider Hematology consult CKD 3 2/2 LN  SCr 1.6--> 1.8-->2-->1.9  Continue Imuran 100mg daily and Plaquenil 200 daily.  Compliments not low so less likely lupus flair.   Repeat C3, C4, ds-DNA  UP/C ratio trended up to 1.2 (baseline 0.3). Will repeat as outpatient   Avoid NSAIDs      Anemia- Dropping Hg, Last hapto low & LDH high. ?low grade hemolysis from chronic SLE. Would repeat LDH, hapto, iron studies, folate, B12, retic. WIll need to be transfused today.

## 2023-01-10 ENCOUNTER — TRANSCRIPTION ENCOUNTER (OUTPATIENT)
Age: 33
End: 2023-01-10

## 2023-01-10 VITALS
SYSTOLIC BLOOD PRESSURE: 129 MMHG | RESPIRATION RATE: 18 BRPM | HEART RATE: 77 BPM | OXYGEN SATURATION: 96 % | DIASTOLIC BLOOD PRESSURE: 85 MMHG | TEMPERATURE: 98 F

## 2023-01-10 LAB
ALBUMIN SERPL ELPH-MCNC: 2.7 G/DL — LOW (ref 3.3–5)
ALP SERPL-CCNC: 114 U/L — SIGNIFICANT CHANGE UP (ref 40–120)
ALT FLD-CCNC: 8 U/L — LOW (ref 10–45)
ANION GAP SERPL CALC-SCNC: 10 MMOL/L — SIGNIFICANT CHANGE UP (ref 5–17)
APTT BLD: 28.8 SEC — SIGNIFICANT CHANGE UP (ref 27.5–35.5)
AST SERPL-CCNC: 17 U/L — SIGNIFICANT CHANGE UP (ref 10–40)
BASOPHILS # BLD AUTO: 0.03 K/UL — SIGNIFICANT CHANGE UP (ref 0–0.2)
BASOPHILS NFR BLD AUTO: 0.3 % — SIGNIFICANT CHANGE UP (ref 0–2)
BILIRUB SERPL-MCNC: 0.2 MG/DL — SIGNIFICANT CHANGE UP (ref 0.2–1.2)
BUN SERPL-MCNC: 23 MG/DL — SIGNIFICANT CHANGE UP (ref 7–23)
C3 SERPL-MCNC: 112 MG/DL — SIGNIFICANT CHANGE UP (ref 81–157)
C3 SERPL-MCNC: 151 MG/DL
C4 SERPL-MCNC: 26 MG/DL — SIGNIFICANT CHANGE UP (ref 13–39)
C4 SERPL-MCNC: 32 MG/DL
CALCIUM SERPL-MCNC: 8.8 MG/DL — SIGNIFICANT CHANGE UP (ref 8.4–10.5)
CHLORIDE SERPL-SCNC: 106 MMOL/L — SIGNIFICANT CHANGE UP (ref 96–108)
CO2 SERPL-SCNC: 23 MMOL/L — SIGNIFICANT CHANGE UP (ref 22–31)
CREAT SERPL-MCNC: 1.79 MG/DL — HIGH (ref 0.5–1.3)
DSDNA AB SER-ACNC: <12 IU/ML
EGFR: 39 ML/MIN/1.73M2 — LOW
EOSINOPHIL # BLD AUTO: 0.28 K/UL — SIGNIFICANT CHANGE UP (ref 0–0.5)
EOSINOPHIL NFR BLD AUTO: 3 % — SIGNIFICANT CHANGE UP (ref 0–6)
FIBRINOGEN PPP-MCNC: 634 MG/DL — HIGH (ref 200–445)
GLUCOSE SERPL-MCNC: 72 MG/DL — SIGNIFICANT CHANGE UP (ref 70–99)
HCT VFR BLD CALC: 23.9 % — LOW (ref 34.5–45)
HGB BLD-MCNC: 7.9 G/DL — LOW (ref 11.5–15.5)
IMM GRANULOCYTES NFR BLD AUTO: 1.3 % — HIGH (ref 0–0.9)
INR BLD: 0.85 RATIO — LOW (ref 0.88–1.16)
LDH SERPL L TO P-CCNC: 295 U/L — HIGH (ref 50–242)
LYMPHOCYTES # BLD AUTO: 1.73 K/UL — SIGNIFICANT CHANGE UP (ref 1–3.3)
LYMPHOCYTES # BLD AUTO: 18.2 % — SIGNIFICANT CHANGE UP (ref 13–44)
MCHC RBC-ENTMCNC: 32 PG — SIGNIFICANT CHANGE UP (ref 27–34)
MCHC RBC-ENTMCNC: 33.1 GM/DL — SIGNIFICANT CHANGE UP (ref 32–36)
MCV RBC AUTO: 96.8 FL — SIGNIFICANT CHANGE UP (ref 80–100)
MONOCYTES # BLD AUTO: 0.39 K/UL — SIGNIFICANT CHANGE UP (ref 0–0.9)
MONOCYTES NFR BLD AUTO: 4.1 % — SIGNIFICANT CHANGE UP (ref 2–14)
NEUTROPHILS # BLD AUTO: 6.93 K/UL — SIGNIFICANT CHANGE UP (ref 1.8–7.4)
NEUTROPHILS NFR BLD AUTO: 73.1 % — SIGNIFICANT CHANGE UP (ref 43–77)
NRBC # BLD: 0 /100 WBCS — SIGNIFICANT CHANGE UP (ref 0–0)
PLATELET # BLD AUTO: 194 K/UL — SIGNIFICANT CHANGE UP (ref 150–400)
POTASSIUM SERPL-MCNC: 4.5 MMOL/L — SIGNIFICANT CHANGE UP (ref 3.5–5.3)
POTASSIUM SERPL-SCNC: 4.5 MMOL/L — SIGNIFICANT CHANGE UP (ref 3.5–5.3)
PROT SERPL-MCNC: 5.5 G/DL — LOW (ref 6–8.3)
PROTHROM AB SERPL-ACNC: 9.8 SEC — LOW (ref 10.5–13.4)
RBC # BLD: 2.47 M/UL — LOW (ref 3.8–5.2)
RBC # FLD: 16.2 % — HIGH (ref 10.3–14.5)
SODIUM SERPL-SCNC: 139 MMOL/L — SIGNIFICANT CHANGE UP (ref 135–145)
URATE SERPL-MCNC: 10.2 MG/DL — HIGH (ref 2.5–7)
WBC # BLD: 9.48 K/UL — SIGNIFICANT CHANGE UP (ref 3.8–10.5)
WBC # FLD AUTO: 9.48 K/UL — SIGNIFICANT CHANGE UP (ref 3.8–10.5)

## 2023-01-10 PROCEDURE — 83540 ASSAY OF IRON: CPT

## 2023-01-10 PROCEDURE — 86160 COMPLEMENT ANTIGEN: CPT

## 2023-01-10 PROCEDURE — 81001 URINALYSIS AUTO W/SCOPE: CPT

## 2023-01-10 PROCEDURE — 83550 IRON BINDING TEST: CPT

## 2023-01-10 PROCEDURE — 86901 BLOOD TYPING SEROLOGIC RH(D): CPT

## 2023-01-10 PROCEDURE — 82570 ASSAY OF URINE CREATININE: CPT

## 2023-01-10 PROCEDURE — 82728 ASSAY OF FERRITIN: CPT

## 2023-01-10 PROCEDURE — 85384 FIBRINOGEN ACTIVITY: CPT

## 2023-01-10 PROCEDURE — 82962 GLUCOSE BLOOD TEST: CPT

## 2023-01-10 PROCEDURE — 85025 COMPLETE CBC W/AUTO DIFF WBC: CPT

## 2023-01-10 PROCEDURE — 36430 TRANSFUSION BLD/BLD COMPNT: CPT

## 2023-01-10 PROCEDURE — 80048 BASIC METABOLIC PNL TOTAL CA: CPT

## 2023-01-10 PROCEDURE — 86769 SARS-COV-2 COVID-19 ANTIBODY: CPT

## 2023-01-10 PROCEDURE — 84550 ASSAY OF BLOOD/URIC ACID: CPT

## 2023-01-10 PROCEDURE — 86923 COMPATIBILITY TEST ELECTRIC: CPT

## 2023-01-10 PROCEDURE — 36415 COLL VENOUS BLD VENIPUNCTURE: CPT

## 2023-01-10 PROCEDURE — 59050 FETAL MONITOR W/REPORT: CPT

## 2023-01-10 PROCEDURE — 85610 PROTHROMBIN TIME: CPT

## 2023-01-10 PROCEDURE — 86900 BLOOD TYPING SEROLOGIC ABO: CPT

## 2023-01-10 PROCEDURE — 84156 ASSAY OF PROTEIN URINE: CPT

## 2023-01-10 PROCEDURE — 86762 RUBELLA ANTIBODY: CPT

## 2023-01-10 PROCEDURE — 84466 ASSAY OF TRANSFERRIN: CPT

## 2023-01-10 PROCEDURE — 85730 THROMBOPLASTIN TIME PARTIAL: CPT

## 2023-01-10 PROCEDURE — 86225 DNA ANTIBODY NATIVE: CPT

## 2023-01-10 PROCEDURE — P9040: CPT

## 2023-01-10 PROCEDURE — 99233 SBSQ HOSP IP/OBS HIGH 50: CPT

## 2023-01-10 PROCEDURE — 85027 COMPLETE CBC AUTOMATED: CPT

## 2023-01-10 PROCEDURE — 83615 LACTATE (LD) (LDH) ENZYME: CPT

## 2023-01-10 PROCEDURE — 86780 TREPONEMA PALLIDUM: CPT

## 2023-01-10 PROCEDURE — 87340 HEPATITIS B SURFACE AG IA: CPT

## 2023-01-10 PROCEDURE — 86850 RBC ANTIBODY SCREEN: CPT

## 2023-01-10 PROCEDURE — 83735 ASSAY OF MAGNESIUM: CPT

## 2023-01-10 PROCEDURE — 80053 COMPREHEN METABOLIC PANEL: CPT

## 2023-01-10 RX ORDER — HYDROXYCHLOROQUINE SULFATE 200 MG
1 TABLET ORAL
Qty: 0 | Refills: 0 | DISCHARGE
Start: 2023-01-10

## 2023-01-10 RX ORDER — ACETAMINOPHEN 500 MG
3 TABLET ORAL
Qty: 0 | Refills: 0 | DISCHARGE
Start: 2023-01-10

## 2023-01-10 RX ORDER — LANOLIN
1 OINTMENT (GRAM) TOPICAL
Qty: 0 | Refills: 0 | DISCHARGE
Start: 2023-01-10

## 2023-01-10 RX ORDER — ASCORBIC ACID 60 MG
1 TABLET,CHEWABLE ORAL
Qty: 0 | Refills: 0 | DISCHARGE
Start: 2023-01-10

## 2023-01-10 RX ORDER — FERROUS SULFATE 325(65) MG
1 TABLET ORAL
Qty: 0 | Refills: 0 | DISCHARGE
Start: 2023-01-10

## 2023-01-10 RX ORDER — DIBUCAINE 1 %
1 OINTMENT (GRAM) RECTAL
Qty: 0 | Refills: 0 | DISCHARGE
Start: 2023-01-10

## 2023-01-10 RX ORDER — AMLODIPINE BESYLATE 2.5 MG/1
1 TABLET ORAL
Qty: 0 | Refills: 0 | DISCHARGE
Start: 2023-01-10

## 2023-01-10 RX ORDER — BENZOCAINE 10 %
1 GEL (GRAM) MUCOUS MEMBRANE
Qty: 0 | Refills: 0 | DISCHARGE
Start: 2023-01-10

## 2023-01-10 RX ORDER — AZATHIOPRINE 100 MG/1
2 TABLET ORAL
Qty: 0 | Refills: 0 | DISCHARGE
Start: 2023-01-10

## 2023-01-10 RX ADMIN — Medication 975 MILLIGRAM(S): at 02:40

## 2023-01-10 RX ADMIN — PANTOPRAZOLE SODIUM 20 MILLIGRAM(S): 20 TABLET, DELAYED RELEASE ORAL at 08:24

## 2023-01-10 RX ADMIN — SENNA PLUS 1 TABLET(S): 8.6 TABLET ORAL at 11:42

## 2023-01-10 RX ADMIN — Medication 975 MILLIGRAM(S): at 15:07

## 2023-01-10 RX ADMIN — Medication 1 TABLET(S): at 11:41

## 2023-01-10 RX ADMIN — Medication 975 MILLIGRAM(S): at 09:23

## 2023-01-10 RX ADMIN — Medication 500 MILLIGRAM(S): at 11:41

## 2023-01-10 RX ADMIN — Medication 975 MILLIGRAM(S): at 08:23

## 2023-01-10 RX ADMIN — Medication 325 MILLIGRAM(S): at 11:41

## 2023-01-10 RX ADMIN — AMLODIPINE BESYLATE 10 MILLIGRAM(S): 2.5 TABLET ORAL at 11:42

## 2023-01-10 RX ADMIN — POLYETHYLENE GLYCOL 3350 17 GRAM(S): 17 POWDER, FOR SOLUTION ORAL at 14:08

## 2023-01-10 RX ADMIN — Medication 975 MILLIGRAM(S): at 03:40

## 2023-01-10 RX ADMIN — Medication 975 MILLIGRAM(S): at 14:07

## 2023-01-10 NOTE — DISCHARGE NOTE OB - NPI NUMBER (FOR [employer] USE ONLY) :
I;m not sure how you want to bill this, but the pt cam in for a Julissa Marcos and she was unable to complete, so we couseled on using OCP's
[[id number]]

## 2023-01-10 NOTE — PROGRESS NOTE ADULT - ASSESSMENT
31yo PPD#3 s/p ,  c/b siPEC and lupus nephritis.  Patient is clinically stable. BPs well controlled, patient s/p 1upRBC yesterday with appropriate rise.    #siPEC  -c/w amlodipine   -s/p Mg 24 hr postpartum for seizure prophylaxis (-)  -Cr now downtrending (1.68->>2.07->1.94->1.79), otherwise HELLP wnl  -Monitor BP     #Lupus nephritis  -c/w Plaquenil  -Appreciate Nephrology recs: consider trial of IVF NS @75cc/hr if Cr continues to uptrend, can dc labetalol and monitor BP     #Postpartum  - Pain well controlled, continue current pain regimen  - H/H 6.7/20.1 -> 1upRBC -> 8.1/23.5  - Bowel regimen  - Increase ambulation, SCDs when not ambulating; c/w HSQ  - Continue regular diet    Melina Nj PGY-3

## 2023-01-10 NOTE — DISCHARGE NOTE OB - CARE PLAN
1 Principal Discharge DX:	Spontaneous vaginal delivery  Assessment and plan of treatment:	pelvic rest x 6 weeks  Secondary Diagnosis:	Systemic lupus erythematosus (SLE) in adult  Assessment and plan of treatment:	cont current medication regimen per nephrology  f/u nephrology in 1-2 weeks  f/u with Dr. Frey 2 weeks

## 2023-01-10 NOTE — PROGRESS NOTE ADULT - ATTENDING COMMENTS
Sent for worsening renal fxn, possible superimposed PEC; lupus  s/p vaginal delivery    #CKD3- with ama with recent rise in cr to 1.6 to 1.8 to 2.0, now 1.9 and stable  hemodynamic BP fluctuations/post partum equilibration occurring   cr improved today  no nsaids  #lupus//lupus nephritis  cont imuran  #pec      mg drip now off  hb stable, platelets better  #HTN- labetalol and amlodipine; monitor bp; off labetolol and amlodipine  she will monitor bp at home  #anemia- monitor trend     d.w OB Sent for worsening renal fxn, possible superimposed PEC; lupus  s/p vaginal delivery    #CKD3- with ama with recent rise in cr to 1.6 to 1.8 to 2.0, now 1.9 and stable  hemodynamic BP fluctuations/post partum equilibration occurring   cr improved today  no nsaids  #lupus//lupus nephritis  cont imuran  #pec      mg drip now off  hb stable, platelets better  #HTN- monitor bp; off labetolol and amlodipine  she will monitor bp at home  #anemia- monitor trend     d.w OB

## 2023-01-10 NOTE — DISCHARGE NOTE OB - NS MD DC FALL RISK RISK
For information on Fall & Injury Prevention, visit: [email or web address] OR  [email or web address] OR  https://www.cdc.gov/steadi/patient.html

## 2023-01-10 NOTE — DISCHARGE NOTE OB - MEDICATION SUMMARY - MEDICATIONS TO TAKE
I will START or STAY ON the medications listed below when I get home from the hospital:    acetaminophen 325 mg oral tablet  -- 3 tab(s) by mouth 3 times a day  -- Indication: For pain    hydroxychloroquine 200 mg oral tablet  -- 1 tab(s) by mouth once a day  -- Indication: For pain    amLODIPine 10 mg oral tablet  -- 1 tab(s) by mouth once a day  -- Indication: For SLE    dibucaine 1% topical ointment  -- 1 application on skin every 6 hours, As needed, Perineal discomfort  -- Indication: For perineal discomfort    benzocaine 20% topical spray  -- 1 spray(s) on skin every 6 hours, As needed, for Perineal discomfort  -- Indication: For perineal discomfort    lanolin topical ointment  -- 1 application on skin every 6 hours, As needed, nipple soreness  -- Indication: For nipple soreness    azaTHIOprine 50 mg oral tablet  -- 2 tab(s) by mouth once a day  -- Indication: For SLE    ferrous sulfate 325 mg (65 mg elemental iron) oral tablet  -- 1 tab(s) by mouth once a day  -- Indication: For anemia    Prenatal Multivitamins with Folic Acid 1 mg oral tablet  -- 1 tab(s) by mouth once a day  -- Indication: For pnv    ascorbic acid 500 mg oral tablet  -- 1 tab(s) by mouth once a day  -- Indication: For anemia

## 2023-01-10 NOTE — DISCHARGE NOTE OB - COMMENTS
Check blood pressure 2 x daily.  Notify md for signs of hypertension and/or blood pressure greater than 150/99

## 2023-01-10 NOTE — DISCHARGE NOTE OB - MATERIALS PROVIDED
Vaccinations/[facility]  Screening Program/  Immunization Record/Breastfeeding Log/Bottle Feeding Log/Breastfeeding Mother’s Support Group Information/Guide to Postpartum Care/[facility] Hearing Screen Program/Back To Sleep Handout/Shaken Baby Prevention Handout/Breastfeeding Guide and Packet/Birth Certificate Instructions pec/post birth warning signs/Vaccinations/[facility]  Screening Program/[place]  Immunization Record/Breastfeeding Log/Bottle Feeding Log/Breastfeeding Mother’s Support Group Information/Guide to Postpartum Care/[facility] Hearing Screen Program/Back To Sleep Handout/Shaken Baby Prevention Handout/Breastfeeding Guide and Packet/Birth Certificate Instructions

## 2023-01-10 NOTE — DISCHARGE NOTE OB - CARE PROVIDER_API CALL
Bertha Frey  Obstetrics and Gynecology  [address] [place] [id number]  Phone: [phone]  Fax: [phone]  Follow Up Time:

## 2023-01-10 NOTE — PROGRESS NOTE ADULT - SUBJECTIVE AND OBJECTIVE BOX
R3 Progress Note   PPD#3    HD#6    Patient seen and examined at bedside, no acute overnight events. No acute complaints, pain well controlled. Patient is ambulating, voiding spontaneously, passing gas, and tolerating regular diet. Denies CP, SOB, N/V, HA, blurred vision, epigastric pain.    Vital Signs Last 24 Hours  T(C): 36.7 (01-10-23 @ 01:01), Max: 36.8 (01-09-23 @ 08:08)  HR: 63 (01-10-23 @ 01:01) (63 - 98)  BP: 112/77 (01-10-23 @ 01:01) (102/66 - 130/83)  RR: 18 (01-10-23 @ 01:01) (16 - 18)  SpO2: 97% (01-10-23 @ 01:01) (96% - 98%)    Physical Exam:  General: NAD  Abdomen: Soft, non-tender, non-distended, fundus firm  Pelvic: Lochia wnl    Labs:    Blood Type: B Positive  Antibody Screen: Negative  RPR: Negative               8.1    10.54 )-----------( 176      ( 01-09 @ 18:25 )             23.5                6.7    8.97  )-----------( 159      ( 01-09 @ 13:20 )             20.9                6.7    8.68  )-----------( 149      ( 01-09 @ 06:34 )             20.1         MEDICATIONS  (STANDING):  acetaminophen     Tablet .. 975 milliGRAM(s) Oral <User Schedule>  amLODIPine   Tablet 10 milliGRAM(s) Oral daily  ascorbic acid 500 milliGRAM(s) Oral daily  azaTHIOprine 100 milliGRAM(s) Oral daily  diphtheria/tetanus/pertussis (acellular) Vaccine (Adacel) 0.5 milliLiter(s) IntraMuscular once  ferrous    sulfate 325 milliGRAM(s) Oral daily  heparin   Injectable 5000 Unit(s) SubCutaneous every 12 hours  hydroxychloroquine 200 milliGRAM(s) Oral daily  lactulose Syrup 10 Gram(s) Oral once  oxytocin Infusion 333.333 milliUNIT(s)/Min (1000 mL/Hr) IV Continuous <Continuous>  oxytocin Infusion 333.333 milliUNIT(s)/Min (1000 mL/Hr) IV Continuous <Continuous>  oxytocin Infusion. 2 milliUNIT(s)/Min (2 mL/Hr) IV Continuous <Continuous>  pantoprazole    Tablet 20 milliGRAM(s) Oral before breakfast  polyethylene glycol 3350 17 Gram(s) Oral daily  prenatal multivitamin 1 Tablet(s) Oral daily  senna 1 Tablet(s) Oral daily  sodium chloride 0.9% lock flush 3 milliLiter(s) IV Push every 8 hours    MEDICATIONS  (PRN):  benzocaine 20%/menthol 0.5% Spray 1 Spray(s) Topical every 6 hours PRN for Perineal discomfort  bisacodyl Suppository 10 milliGRAM(s) Rectal daily PRN Constipation  dibucaine 1% Ointment 1 Application(s) Topical every 6 hours PRN Perineal discomfort  diphenhydrAMINE 25 milliGRAM(s) Oral every 6 hours PRN Pruritus  hydrocortisone 1% Cream 1 Application(s) Topical every 6 hours PRN Moderate Pain (4-6)  lanolin Ointment 1 Application(s) Topical every 6 hours PRN nipple soreness  magnesium hydroxide Suspension 30 milliLiter(s) Oral two times a day PRN Constipation  oxyCODONE    IR 5 milliGRAM(s) Oral every 3 hours PRN Severe Pain (7 - 10)  pramoxine 1%/zinc 5% Cream 1 Application(s) Topical every 4 hours PRN Moderate Pain (4-6)  simethicone 80 milliGRAM(s) Chew every 4 hours PRN Gas  witch hazel Pads 1 Application(s) Topical every 4 hours PRN Perineal discomfort

## 2023-01-10 NOTE — DISCHARGE NOTE OB - PATIENT PORTAL LINK FT
You can access the FollowMyHealth Patient Portal offered by [facility] by registering at the following website: [email or web address]. By joining [employer]’s FollowMyHealth portal, you will also be able to view your health information using other applications (apps) compatible with our system.

## 2023-01-10 NOTE — PROGRESS NOTE ADULT - PROBLEM SELECTOR PROBLEM 1
Severe pre-eclampsia, third trimester

## 2023-01-10 NOTE — PROGRESS NOTE ADULT - SUBJECTIVE AND OBJECTIVE BOX
[facility] DIVISION OF KIDNEY DISEASES AND HYPERTENSION -- FOLLOW UP NOTE  --------------------------------------------------------------------------------  Chief Complaint:/subjective: no complaints   feeling better  no lightheadedness    24 hour events:s/p prbc        PAST HISTORY  --------------------------------------------------------------------------------  No significant changes to PMH, PSH, FHx, SHx, unless otherwise noted    ALLERGIES & MEDICATIONS  --------------------------------------------------------------------------------  Allergies    No Known Allergies    Intolerances      Standing Inpatient Medications  acetaminophen     Tablet .. 975 milliGRAM(s) Oral <User Schedule>  amLODIPine   Tablet 10 milliGRAM(s) Oral daily  ascorbic acid 500 milliGRAM(s) Oral daily  azaTHIOprine 100 milliGRAM(s) Oral daily  diphtheria/tetanus/pertussis (acellular) Vaccine (Adacel) 0.5 milliLiter(s) IntraMuscular once  ferrous    sulfate 325 milliGRAM(s) Oral daily  heparin   Injectable 5000 Unit(s) SubCutaneous every 12 hours  hydroxychloroquine 200 milliGRAM(s) Oral daily  lactulose Syrup 10 Gram(s) Oral once  oxytocin Infusion 333.333 milliUNIT(s)/Min IV Continuous <Continuous>  oxytocin Infusion 333.333 milliUNIT(s)/Min IV Continuous <Continuous>  oxytocin Infusion. 2 milliUNIT(s)/Min IV Continuous <Continuous>  pantoprazole    Tablet 20 milliGRAM(s) Oral before breakfast  polyethylene glycol 3350 17 Gram(s) Oral daily  prenatal multivitamin 1 Tablet(s) Oral daily  senna 1 Tablet(s) Oral daily  sodium chloride 0.9% lock flush 3 milliLiter(s) IV Push every 8 hours    PRN Inpatient Medications  benzocaine 20%/menthol 0.5% Spray 1 Spray(s) Topical every 6 hours PRN  bisacodyl Suppository 10 milliGRAM(s) Rectal daily PRN  dibucaine 1% Ointment 1 Application(s) Topical every 6 hours PRN  diphenhydrAMINE 25 milliGRAM(s) Oral every 6 hours PRN  hydrocortisone 1% Cream 1 Application(s) Topical every 6 hours PRN  lanolin Ointment 1 Application(s) Topical every 6 hours PRN  magnesium hydroxide Suspension 30 milliLiter(s) Oral two times a day PRN  oxyCODONE    IR 5 milliGRAM(s) Oral every 3 hours PRN  pramoxine 1%/zinc 5% Cream 1 Application(s) Topical every 4 hours PRN  simethicone 80 milliGRAM(s) Chew every 4 hours PRN  witch hazel Pads 1 Application(s) Topical every 4 hours PRN      REVIEW OF SYSTEMS  --------------------------------------------------------------------------------  Gen: No weight changes, fatigue, fevers/chills, weakness  Skin: No rashes  Head/Eyes/Ears/Mouth: No headache;   Respiratory: No dyspnea, cough  CV: No chest pain, PND, orthopnea  GI: No abdominal pain, diarrhea, constipation, nausea, vomiting  : No increased frequency, dysuria, hematuria, nocturia  MSK: No joint pain/swelling; no back pain; no edema  Neuro: No dizziness/lightheadedness, weakness  Heme: No easy bruising or bleeding  Psych: No significant nervousness, anxiety, stress, depression    All other systems were reviewed and are negative, except as noted.    VITALS/PHYSICAL EXAM  --------------------------------------------------------------------------------  T(C): 36.5 (01-10-23 @ 09:32), Max: 36.8 (01-09-23 @ 21:19)  HR: 77 (01-10-23 @ 09:32) (63 - 98)  BP: 129/85 (01-10-23 @ 09:32) (102/66 - 130/83)  RR: 18 (01-10-23 @ 09:32) (16 - 18)  SpO2: 96% (01-10-23 @ 09:32) (96% - 98%)  Wt(kg): --  Adult Advanced Hemodynamics Last 24 Hrs  ABP: --  ABP(mean): --  CVP(mm Hg): --  CO: --  CI: --  PA: --  PA(mean): --  PCWP: --  SVR: --  SVRI: --        01-09-23 @ 07:01  -  01-10-23 @ 07:00  --------------------------------------------------------  IN: 350 mL / OUT: 1600 mL / NET: -1250 mL      Physical Exam:  	Gen: NAD,    	HEENT:   no jvp  	Pulm: CTA B/L  	CV: RRR, S1S2; no rub  	Back:  no sacral edema  	Abd: +BS, soft,    	: No suprapubic tenderness  	Ext: no edema  	Neuro: awake  	Psych: alert  	Skin: Warm,    	Vascular access:    LABS/STUDIES  --------------------------------------------------------------------------------              7.9    9.48  >-----------<  194      [01-10-23 @ 07:28]              23.9     Hemoglobin: 7.9 g/dL (01-10-23 @ 07:28)  Hemoglobin: 8.1 g/dL (01-09-23 @ 18:25)    Platelet Count - Automated: 194 K/uL (01-10-23 @ 07:28)  Platelet Count - Automated: 176 K/uL (01-09-23 @ 18:25)    139  |  106  |  23  ----------------------------<  72      [01-10-23 @ 07:28]  4.5   |  23  |  1.79        Ca     8.8     [01-10-23 @ 07:28]    TPro  5.5  /  Alb  2.7  /  TBili  0.2  /  DBili  x   /  AST  17  /  ALT  8   /  AlkPhos  114  [01-10-23 @ 07:28]    PT/INR: PT 9.8  , INR 0.85       [01-10-23 @ 07:28]  PTT: 28.8       [01-10-23 @ 07:28]    Uric acid 10.2      [01-10-23 @ 07:28]        [01-10-23 @ 07:28]    Creatinine Trend:  SCr 1.79 [01-10 @ 07:28]  SCr 1.79 [01-09 @ 18:26]  SCr 1.90 [01-09 @ 13:20]  SCr 1.94 [01-09 @ 06:31]  SCr 2.07 [01-08 @ 11:28]    Urinalysis - [01-05-23 @ 16:11]      Color Light Yellow / Appearance Clear / SG 1.008 / pH 6.5      Gluc Negative / Ketone Negative  / Bili Negative / Urobili Negative       Blood Trace / Protein 100 mg/dl / Leuk Est Negative / Nitrite Negative      RBC 1 / WBC 1 / Hyaline  / Gran  / Sq Epi  / Non Sq Epi 1 / Bacteria Negative    Urine Creatinine 55      [01-05-23 @ 16:34]  Urine Protein 68      [01-05-23 @ 16:34]    Iron 38, TIBC 311, %sat 12      [01-09-23 @ 13:20]  Ferritin 113      [01-09-23 @ 13:20]      C3 Complement 112      [01-09-23 @ 18:26]  C4 Complement 26      [01-09-23 @ 18:26]  Syphilis Screen (Treponema Pallidum Ab) Negative      [01-05-23 @ 16:11]

## 2023-01-10 NOTE — DISCHARGE NOTE OB - PLAN OF CARE
pelvic rest x 6 weeks cont current medication regimen per nephrology  f/u nephrology in 1-2 weeks  f/u with Dr. Frey 2 weeks

## 2023-01-10 NOTE — DISCHARGE NOTE OB - HOSPITAL COURSE
pt had uncomplicated vaginal delivery. Peripartum course significant for SLE nephritis managed by renal. Pt with si PEC managed with antihypertensive/mg. Anemia treated wtih po meds and transfused 1 unit PRBC. Pt meets criteria for discharge.

## 2023-01-11 ENCOUNTER — APPOINTMENT (OUTPATIENT)
Dept: OBGYN | Facility: CLINIC | Age: 33
End: 2023-01-11

## 2023-01-11 ENCOUNTER — NON-APPOINTMENT (OUTPATIENT)
Age: 33
End: 2023-01-11

## 2023-01-11 ENCOUNTER — APPOINTMENT (OUTPATIENT)
Dept: OPHTHALMOLOGY | Facility: CLINIC | Age: 33
End: 2023-01-11

## 2023-01-11 LAB — DSDNA AB SER-ACNC: <12 IU/ML — SIGNIFICANT CHANGE UP

## 2023-01-12 ENCOUNTER — NON-APPOINTMENT (OUTPATIENT)
Age: 33
End: 2023-01-12

## 2023-01-13 ENCOUNTER — APPOINTMENT (OUTPATIENT)
Dept: OBGYN | Facility: CLINIC | Age: 33
End: 2023-01-13

## 2023-01-15 ENCOUNTER — NON-APPOINTMENT (OUTPATIENT)
Age: 33
End: 2023-01-15

## 2023-01-16 ENCOUNTER — NON-APPOINTMENT (OUTPATIENT)
Age: 33
End: 2023-01-16

## 2023-01-17 ENCOUNTER — TRANSCRIPTION ENCOUNTER (OUTPATIENT)
Age: 33
End: 2023-01-17

## 2023-01-17 ENCOUNTER — NON-APPOINTMENT (OUTPATIENT)
Age: 33
End: 2023-01-17

## 2023-01-18 ENCOUNTER — TRANSCRIPTION ENCOUNTER (OUTPATIENT)
Age: 33
End: 2023-01-18

## 2023-01-19 NOTE — OB RN DELIVERY SUMMARY - BABY A: WEIGHT IN POUNDS (FROM GRAMS), DELIVERY
Airway  Date/Time: 1/19/2023 3:48 PM  Urgency: elective    Airway not difficult    General Information and Staff    Patient location during procedure: OR  Anesthesiologist: Vikas Camacho MD  Performed: anesthesiologist     Indications and Patient Condition  Indications for airway management: anesthesia  Spontaneous Ventilation: absent  Sedation level: deep  Preoxygenated: yes  Patient position: sniffing  Mask difficulty assessment: 1 - vent by mask    Final Airway Details  Final airway type: endotracheal airway      Successful airway: ETT  Cuffed: yes   Successful intubation technique: direct laryngoscopy  Facilitating devices/methods: intubating stylet  Endotracheal tube insertion site: oral  Blade: Andrés  Blade size: #4  ETT size (mm): 7.0    Cormack-Lehane Classification: grade I - full view of glottis  Placement verified by: chest auscultation and capnometry   Cuff volume (mL): 5  Measured from: lips  ETT to lips (cm): 21  Number of attempts at approach: 1  Ventilation between attempts: none  Number of other approaches attempted: 0 5

## 2023-01-20 ENCOUNTER — NON-APPOINTMENT (OUTPATIENT)
Age: 33
End: 2023-01-20

## 2023-01-20 LAB
ALBUMIN SERPL ELPH-MCNC: 3.4 G/DL
ANION GAP SERPL CALC-SCNC: 13 MMOL/L
BASOPHILS # BLD AUTO: 0.07 K/UL
BASOPHILS NFR BLD AUTO: 0.7 %
BUN SERPL-MCNC: 26 MG/DL
C3 SERPL-MCNC: 202 MG/DL
C4 SERPL-MCNC: 45 MG/DL
CALCIUM SERPL-MCNC: 9.2 MG/DL
CHLORIDE SERPL-SCNC: 100 MMOL/L
CO2 SERPL-SCNC: 23 MMOL/L
CREAT SERPL-MCNC: 1.82 MG/DL
DSDNA AB SER-ACNC: <12 IU/ML
EGFR: 38 ML/MIN/1.73M2
EOSINOPHIL # BLD AUTO: 0.24 K/UL
EOSINOPHIL NFR BLD AUTO: 2.6 %
FERRITIN SERPL-MCNC: 89 NG/ML
GLUCOSE SERPL-MCNC: 89 MG/DL
HAPTOGLOB SERPL-MCNC: 227 MG/DL
HCT VFR BLD CALC: 27.6 %
HGB BLD-MCNC: 9 G/DL
IMM GRANULOCYTES NFR BLD AUTO: 2 %
IRON SATN MFR SERPL: 19 %
IRON SERPL-MCNC: 67 UG/DL
LDH SERPL-CCNC: 329 U/L
LYMPHOCYTES # BLD AUTO: 1.64 K/UL
LYMPHOCYTES NFR BLD AUTO: 17.6 %
MAN DIFF?: NORMAL
MCHC RBC-ENTMCNC: 32.4 PG
MCHC RBC-ENTMCNC: 32.6 GM/DL
MCV RBC AUTO: 99.3 FL
MONOCYTES # BLD AUTO: 0.54 K/UL
MONOCYTES NFR BLD AUTO: 5.8 %
NEUTROPHILS # BLD AUTO: 6.66 K/UL
NEUTROPHILS NFR BLD AUTO: 71.3 %
PHOSPHATE SERPL-MCNC: 5 MG/DL
PLATELET # BLD AUTO: 357 K/UL
POTASSIUM SERPL-SCNC: 4.9 MMOL/L
RBC # BLD: 2.78 M/UL
RBC # FLD: 14.4 %
SODIUM SERPL-SCNC: 135 MMOL/L
TIBC SERPL-MCNC: 348 UG/DL
UIBC SERPL-MCNC: 281 UG/DL
URATE SERPL-MCNC: 9.3 MG/DL
WBC # FLD AUTO: 9.34 K/UL

## 2023-01-25 ENCOUNTER — NON-APPOINTMENT (OUTPATIENT)
Age: 33
End: 2023-01-25

## 2023-01-25 ENCOUNTER — LABORATORY RESULT (OUTPATIENT)
Age: 33
End: 2023-01-25

## 2023-01-25 ENCOUNTER — APPOINTMENT (OUTPATIENT)
Dept: OBGYN | Facility: CLINIC | Age: 33
End: 2023-01-25
Payer: COMMERCIAL

## 2023-01-25 VITALS
BODY MASS INDEX: 30.82 KG/M2 | SYSTOLIC BLOOD PRESSURE: 123 MMHG | WEIGHT: 157 LBS | HEIGHT: 60 IN | DIASTOLIC BLOOD PRESSURE: 85 MMHG

## 2023-01-25 PROCEDURE — 36415 COLL VENOUS BLD VENIPUNCTURE: CPT

## 2023-01-25 PROCEDURE — 0503F POSTPARTUM CARE VISIT: CPT

## 2023-01-25 NOTE — HISTORY OF PRESENT ILLNESS
[FreeTextEntry1] : 32 presents for a Post partum initial visit.\par Delivery details:  on 2023 by JR, Male 5lb 3oz . IOL for worsening renal fxn, evolving sPEC\par Delivery complications: None\par \par Current symptoms and complaints: normal bladder function, normal bowel, no PPD sx. initially tearful but feeling better adjusted now. Also had a fever/mylagias ~10d ago, sx resolved with supportive care. Creatinine slightly elevated from baseline at last check. The patient is currently asymptomatic.  no mastitis complaints. \par \par Infant feeding:\par -breastfeeding and bottle feeding\par  \par Exam:\par -No abdominal pain or tenderness\par \par Assessment: \par -s/p , IOL for sPEC, overall doing well\par \par Plan:\par -Inter pregnancy interval discussed\par -mastitis precautions reviewed\par -pelvic rest\par -Creatinine/renal panel drawn today \par -RTO 4 weeks for full post partum visit \par -RTO 3-4 months for annual\par

## 2023-01-25 NOTE — ASSESSMENT
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; global gs 56% ; focal seg 22%; IFTA mod-severe 50%; mild AS; 15/27 gs glomeruli; full house EM\par -Creatinine hx per pt: \par 2019 cr 1.7-1.9\par 2020 cr 2.3 at time of biopsy\par cr downtrended with treatment 1.9 Dec 2020\par cr 1.5 2021 (3 tabs aza)\par cr 1.6-1.7 2022 when aza decreased and approx for one year it has been stable \par -cr July 2022 1.4 \par -cr Aug 2022 1.3\par -cr sept 1.43\par -cr oct 1.40\par -cr nov with OB 1.49\par -Nov 9th 1.43\par -nov 22 1.46\par -Dec 1.43\par -Dec 1.69\par -cr mild uptrend; recheck today along with lupus labs and PEC labs and u prot/cr\par -cont Plaquenil 200 qD, Imuran 100mg/day\par  \par #Non nephrotic range proteinuria- \par june 2020 prot/cr 1.5g\par dec 29 2020 prot.cr 0.1\par june 2022 0.1\par July 0.3\par July 0.2\par August 0.7 \par August 0.2\par Sept 0.7 \par oct 0.2\par nov 0.3 \par dec 0.7\par dec 0.6\par  Proteinuria currently trending up during pregnancy\par Suspected superimposed PEC. Will repeat all labs today. \par \par #SLE-Pt has had negative lupus serologies in the past & lupus serologies currently -ve in Nov c3,c4,dsdna; repeat today\par \par #HTN- BP uptrended, stable in office, however pt reports since 22 weeks uptrending avg BP- was 110/70s then 120/80s, last week during viral illness covid 130/80s now bck down 120s/mid80s, now 120-130s/90s\par on Labetalol 100mg daily and Amlodipine 10mg daily which she takes in evening\par she will monitor BP as she is now; if she notices any elevated BP she will check it more frequently and inform us if elevated\par can always add mng labetalol if evening higher\par Meanwhile if BP > 140/100 then call us & MFM. Seeing MFM tomorrow\par \par  #Anemia-\par -Drop in Hg from 11 in July to 9.8 in Aug with hapto < 20 (normal bili & LDH). Now most recent hapto WNL with Hg uptrending to 10.1\par -?Hx of hemolytic anemia pre-pregnancy likely from lupus. \par -follows with Dr. Troy Gomez. b12 supplementation\par - ADAMTS 13 and APLs labs stable\par -Continue PO iron daily; she is on b12 \par \par #high risk pregnancy- no sx; BP stable\par -she is on Asa for PEC prophylaxis\par -serology sent at dr forrest office July 2022- mild phosphotidylserine + and + silica clotting but neg DRVVT\par -monitor PEC labs. Negative so far.  \par \par  \par  \par  \par

## 2023-01-25 NOTE — END OF VISIT
[FreeTextEntry3] : I Uli Cheema, acted as a scribe on behalf of Dr. Bertha Frey on 01/25/2023.\par \par All medical entries made by this scribe where at my Dr. Bertha Frey, direction and personally dictated by me on 01/25/2023.  I have reviewed the chart and agree that the record accurately reflects my personal performance of the history, physical exam, assessment, and plan. I have also personally directed, reviewed and agreed with the chart.

## 2023-01-25 NOTE — PHYSICAL EXAM
[General Appearance - Alert] : alert [General Appearance - In No Acute Distress] : in no acute distress [General Appearance - Well Nourished] : well nourished [General Appearance - Well Developed] : well developed [General Appearance - Well-Appearing] : healthy appearing [Sclera] : the sclera and conjunctiva were normal [Extraocular Movements] : extraocular movements were intact [Neck Appearance] : the appearance of the neck was normal [] : no respiratory distress [Jugular Venous Distention Increased] : there was no jugular-venous distention [Respiration, Rhythm And Depth] : normal respiratory rhythm and effort [Exaggerated Use Of Accessory Muscles For Inspiration] : no accessory muscle use [Auscultation Breath Sounds / Voice Sounds] : lungs were clear to auscultation bilaterally [Heart Sounds] : normal S1 and S2 [Heart Sounds Gallop] : no gallops [Murmurs] : no murmurs [Heart Sounds Pericardial Friction Rub] : no pericardial rub [Edema] : there was no peripheral edema [Bowel Sounds] : normal bowel sounds [Abdomen Soft] : soft [No CVA Tenderness] : no ~M costovertebral angle tenderness [No Spinal Tenderness] : no spinal tenderness [Abnormal Walk] : normal gait [No Focal Deficits] : no focal deficits [Oriented To Time, Place, And Person] : oriented to person, place, and time [Impaired Insight] : insight and judgment were intact [Affect] : the affect was normal [Mood] : the mood was normal [FreeTextEntry1] : +gravid uterus

## 2023-01-25 NOTE — HISTORY OF PRESENT ILLNESS
[FreeTextEntry1] : Pt is here for follow up\par BROOK Jan 16 induction planned\par She is 35.4 weeks now\par Since her last visit she reports her BP is uptrending. \par SBP in 130's the week prior & this week she has had some higher BP. CR notable for yesterday to have a slight increase\par BP yesterday 139/93, 131/89; BP this morning 1am 144/94, 142/94;  Monday it was 136/104, 127/101. \par SBP in mornings usually 120's   \par \par States she is back with a headache for past 1-2 weeks; She had a prior headache but it went away and has now come back.  c/o feeling tired still   \par Patient denies any blurred vision, double vision, hand swelling, leg swelling, or RUQ pain\par Patient denies any burning or pain with urination. Admits to frothy urine\par \par Compliant with all medications\par On Imuran 100mg daily and Plaquenil 200 daily\par On Labetalol 100mg qD and amlodipine 10mg qd, takes both in evening\par On PPI for GERD & added famotidine 20 bid a month ago\par Other ROS neg\par  \par  \par   \par \par

## 2023-01-26 LAB
ALBUMIN SERPL ELPH-MCNC: 3.8 G/DL
ALP BLD-CCNC: 97 U/L
ALT SERPL-CCNC: 14 U/L
AST SERPL-CCNC: 14 U/L
BILIRUB DIRECT SERPL-MCNC: 0.1 MG/DL
BILIRUB INDIRECT SERPL-MCNC: 0.1 MG/DL
BILIRUB SERPL-MCNC: 0.2 MG/DL
PROT SERPL-MCNC: 6.7 G/DL

## 2023-02-06 ENCOUNTER — APPOINTMENT (OUTPATIENT)
Dept: RHEUMATOLOGY | Facility: CLINIC | Age: 33
End: 2023-02-06
Payer: COMMERCIAL

## 2023-02-06 VITALS
SYSTOLIC BLOOD PRESSURE: 115 MMHG | WEIGHT: 159 LBS | HEART RATE: 91 BPM | HEIGHT: 60 IN | OXYGEN SATURATION: 98 % | BODY MASS INDEX: 31.22 KG/M2 | DIASTOLIC BLOOD PRESSURE: 78 MMHG | TEMPERATURE: 97.9 F

## 2023-02-06 PROCEDURE — 99214 OFFICE O/P EST MOD 30 MIN: CPT

## 2023-02-06 NOTE — HISTORY OF PRESENT ILLNESS
[FreeTextEntry1] : 1.  SLE/LN: Well until  at which time she developed fever and hypertension. She was referred to cardiology who noted proteinuria. There was a delay in seeing a nephrologist, but she eventually was evaluated in .  At that time her creat was 2.3.  A kidney biopsy according to the patient demonstrated class III LN.  She received high dose oral steroids but was intolerant of both MMF and mycophenolate sodium (even at low doses).  She was tried on tacrolimus and azathioprine.  The TAC was stopped after 6 months, but the AZA was continued to present. She stated that her baseline creat has been around 1.8 and that proteinuria disappeared by .  She has not had extra-renal disease. Subsequent years  through  have been quiet. She got through the pregnancy with a slight increase in her baseline creatinine. \par 2.  OB history\par      a. Pregnancy No. 1:  Delivered by  on 2023 at ~36 weeks because of preeclampsia (Boy: Zoravar [5 lbs 3 oz]). She stated that aPL Ab have been mostly negative. On repeat here she had a prolonged SCT. \par 3.  Low haptoglobin: platelets have been normal , and Hb in Oct 2022 = 10.2\par \par Meds\par  mg/d stopped\par hydroxychloroquine 200 mg/d\par amlodipine 10 mg/d\par pantoprazole 20 mg/d\par azathioprine 100 mg/d\par vits\par iron\par \par Vaccines\par PNVX 23\par Prevnar 13

## 2023-02-06 NOTE — PHYSICAL EXAM
[General Appearance - In No Acute Distress] : in no acute distress [General Appearance - Alert] : alert [Sclera] : the sclera and conjunctiva were normal [Outer Ear] : the ears and nose were normal in appearance [Neck Appearance] : the appearance of the neck was normal [Auscultation Breath Sounds / Voice Sounds] : lungs were clear to auscultation bilaterally [Heart Rate And Rhythm] : heart rate was normal and rhythm regular [Heart Sounds] : normal S1 and S2 [Heart Sounds Gallop] : no gallops [Murmurs] : no murmurs [Heart Sounds Pericardial Friction Rub] : no pericardial rub [Full Pulse] : the pedal pulses are present [Edema] : there was no peripheral edema [Bowel Sounds] : normal bowel sounds [Abdomen Soft] : soft [Abdomen Tenderness] : non-tender [Abdomen Mass (___ Cm)] : no abdominal mass palpated [Cervical Lymph Nodes Enlarged Posterior Bilaterally] : posterior cervical [Cervical Lymph Nodes Enlarged Anterior Bilaterally] : anterior cervical [Supraclavicular Lymph Nodes Enlarged Bilaterally] : supraclavicular [No CVA Tenderness] : no ~M costovertebral angle tenderness [No Spinal Tenderness] : no spinal tenderness [Nail Clubbing] : no clubbing  or cyanosis of the fingernails [Abnormal Walk] : normal gait [Musculoskeletal - Swelling] : no joint swelling seen [Motor Tone] : muscle strength and tone were normal [Skin Color & Pigmentation] : normal skin color and pigmentation [Skin Turgor] : normal skin turgor [] : no rash [Sensation] : the sensory exam was normal to light touch and pinprick [Motor Exam] : the motor exam was normal [Oriented To Time, Place, And Person] : oriented to person, place, and time [Impaired Insight] : insight and judgment were intact [Affect] : the affect was normal

## 2023-02-06 NOTE — ASSESSMENT
[FreeTextEntry1] : 1.  SLE/LN: Well until  at which time she developed fever and hypertension. She was referred to cardiology who noted proteinuria. There was a delay in seeing a nephrologist, but she eventually was evaluated in .  At that time her creat was 2.3.  A kidney biopsy according to the patient demonstrated class III LN.  She received high dose oral steroids but was intolerant of both MMF and mycophenolate sodium (even at low doses).  She was tried on tacrolimus and azathioprine.  The TAC was stopped after 6 months, but the AZA was continued to present. She stated that her baseline creat has been around 1.8 and that proteinuria disappeared by .  She has not had extra-renal disease. Subsequent years  through  have been quiet. She got through the pregnancy with a slight increase in her baseline creatinine. \par 2.  OB history\par      a. Pregnancy No. 1:  Delivered by  on 2023 at ~36 weeks because of preeclampsia (Boy: Juancho [5 lbs 3 oz]). She stated that aPL Ab have been mostly negative. On repeat here she had a prolonged SCT. \par 3.  Low haptoglobin: platelets have been normal , and Hb in Oct 2022 = 10.2\par \par Plan:\par 1.  Lab tests  \par 2.  Reviewed internal and/or external records of other providers\par 3.  ECHO previously suggested\par 4.  Return 4 weeks\par 5.  Systemic Lupus Erythematosus, known as lupus, is a chronic autoimmune disease that can affect any organ in the body posing threats to proper organ function and even to life. Therefore, close surveillance of all bodily functions is required, including but not limited to central and peripheral nervous system, ocular and auditory systems, cardiopulmonary function, kidney function, mucocutaneous and musculoskeletal systems as well as constitutional manifestations. Surveillance consists of history, physical, and laboratory tests. Treatment varies, but most of the drugs used are high risk and therefore also require close monitoring in the form of blood and urine tests.\par 6.  High risk medications used in the treatment of rheumatic diseases include steroids, disease modifying agents, immunosuppressive therapies, antimalarials, biologics, and chemotherapy. Regardless of which drug or class of drug, the potential toxicities of these therapies mandate close monitoring in the form of a history, physical, and laboratory tests.\par 7.  Antiphospholipid Syndrome, known as APS, is a chronic autoimmune disease that leads to arterial and/or venous thrombotic events and therefore poses threats to proper organ function and even to life. Therefore, close surveillance of all bodily functions is required, including but not limited to central and peripheral nervous system, ocular and auditory systems, cardiopulmonary function, kidney function, mucocutaneous and musculoskeletal systems. Surveillance consists of history, physical, and laboratory tests. Treatment varies, but those at high risk are generally anticoagulated.  Therefore, patients with APS require close monitoring in the form of blood and urine tests.\par \par  \par

## 2023-02-07 LAB
ALBUMIN SERPL ELPH-MCNC: 4 G/DL
ALP BLD-CCNC: 79 U/L
ALT SERPL-CCNC: 14 U/L
ANION GAP SERPL CALC-SCNC: 14 MMOL/L
APPEARANCE: CLEAR
APTT BLD: 36.8 SEC
AST SERPL-CCNC: 14 U/L
BACTERIA: NEGATIVE
BASOPHILS # BLD AUTO: 0.02 K/UL
BASOPHILS NFR BLD AUTO: 0.4 %
BILIRUB SERPL-MCNC: 0.2 MG/DL
BILIRUBIN URINE: NEGATIVE
BLOOD URINE: ABNORMAL
BUN SERPL-MCNC: 37 MG/DL
C3 SERPL-MCNC: 138 MG/DL
C4 SERPL-MCNC: 37 MG/DL
CALCIUM SERPL-MCNC: 9.4 MG/DL
CHLORIDE SERPL-SCNC: 103 MMOL/L
CK SERPL-CCNC: 89 U/L
CO2 SERPL-SCNC: 20 MMOL/L
COLOR: NORMAL
CONFIRM: 32.5 SEC
CREAT SERPL-MCNC: 2.08 MG/DL
CREAT SPEC-SCNC: 64 MG/DL
CREAT/PROT UR: 0.7 RATIO
DRVVT IMM 1:2 NP PPP: NORMAL
DRVVT SCREEN TO CONFIRM RATIO: 1.11 RATIO
DSDNA AB SER-ACNC: <12 IU/ML
EGFR: 32 ML/MIN/1.73M2
ENA RNP AB SER IA-ACNC: <0.2 AL
ENA SM AB SER IA-ACNC: <0.2 AL
ENA SS-A AB SER IA-ACNC: <0.2 AL
ENA SS-B AB SER IA-ACNC: <0.2 AL
EOSINOPHIL # BLD AUTO: 0.23 K/UL
EOSINOPHIL NFR BLD AUTO: 5.2 %
GLUCOSE QUALITATIVE U: NEGATIVE
HCT VFR BLD CALC: 31.7 %
HGB BLD-MCNC: 10.4 G/DL
IMM GRANULOCYTES NFR BLD AUTO: 0.2 %
INR PPP: 0.91 RATIO
KETONES URINE: NEGATIVE
LEUKOCYTE ESTERASE URINE: NEGATIVE
LYMPHOCYTES # BLD AUTO: 0.96 K/UL
LYMPHOCYTES NFR BLD AUTO: 21.5 %
MAN DIFF?: NORMAL
MCHC RBC-ENTMCNC: 31.9 PG
MCHC RBC-ENTMCNC: 32.8 GM/DL
MCV RBC AUTO: 97.2 FL
MICROSCOPIC-UA: NORMAL
MONOCYTES # BLD AUTO: 0.25 K/UL
MONOCYTES NFR BLD AUTO: 5.6 %
NEUTROPHILS # BLD AUTO: 2.99 K/UL
NEUTROPHILS NFR BLD AUTO: 67.1 %
NITRITE URINE: NEGATIVE
PH URINE: 6
PLATELET # BLD AUTO: 223 K/UL
POTASSIUM SERPL-SCNC: 5.1 MMOL/L
PROT SERPL-MCNC: 7 G/DL
PROT UR-MCNC: 44 MG/DL
PROTEIN URINE: ABNORMAL
PT BLD: 10.5 SEC
RBC # BLD: 3.26 M/UL
RBC # FLD: 13.9 %
RED BLOOD CELLS URINE: 0 /HPF
SCREEN DRVVT: 43.1 SEC
SILICA CLOTTING TIME INTERPRETATION: ABNORMAL
SILICA CLOTTING TIME S/C: 1.22 RATIO
SODIUM SERPL-SCNC: 137 MMOL/L
SPECIFIC GRAVITY URINE: 1.02
SQUAMOUS EPITHELIAL CELLS: 0 /HPF
UROBILINOGEN URINE: NORMAL
WBC # FLD AUTO: 4.46 K/UL
WHITE BLOOD CELLS URINE: 2 /HPF

## 2023-02-08 ENCOUNTER — APPOINTMENT (OUTPATIENT)
Dept: NEPHROLOGY | Facility: CLINIC | Age: 33
End: 2023-02-08
Payer: COMMERCIAL

## 2023-02-08 VITALS
SYSTOLIC BLOOD PRESSURE: 128 MMHG | HEART RATE: 84 BPM | WEIGHT: 164.24 LBS | BODY MASS INDEX: 32.25 KG/M2 | OXYGEN SATURATION: 93 % | TEMPERATURE: 97.2 F | DIASTOLIC BLOOD PRESSURE: 85 MMHG | HEIGHT: 60 IN

## 2023-02-08 LAB
B2 GLYCOPROT1 IGA SERPL IA-ACNC: <5 SAU
B2 GLYCOPROT1 IGG SER-ACNC: <5 SGU
B2 GLYCOPROT1 IGM SER-ACNC: 12.8 SMU
CARDIOLIPIN IGM SER-MCNC: 6.6 MPL
CARDIOLIPIN IGM SER-MCNC: <5 GPL
DEPRECATED CARDIOLIPIN IGA SER: <5 APL

## 2023-02-08 PROCEDURE — 99214 OFFICE O/P EST MOD 30 MIN: CPT

## 2023-02-08 RX ORDER — LABETALOL HYDROCHLORIDE 100 MG/1
100 TABLET, FILM COATED ORAL
Qty: 90 | Refills: 3 | Status: DISCONTINUED | COMMUNITY
Start: 2022-07-26 | End: 2023-02-08

## 2023-02-08 RX ORDER — ASPIRIN ENTERIC COATED TABLETS 81 MG 81 MG/1
81 TABLET, DELAYED RELEASE ORAL DAILY
Refills: 0 | Status: DISCONTINUED | COMMUNITY
Start: 2022-07-26 | End: 2023-02-08

## 2023-02-09 LAB
HYDROXYCHLOROQUINE CONCENTRATION: 635 NG/ML
PS IGA SER QL: <1
PS IGG SER QL: 9 UNITS
PS IGM SER QL: 35 UNITS
PS-PROTHROM CMPLX IGG SERPL IA-ACNC: 17 U
PS-PROTHROM CMPLX IGM SERPL IA-ACNC: 58.8 U

## 2023-02-10 ENCOUNTER — NON-APPOINTMENT (OUTPATIENT)
Age: 33
End: 2023-02-10

## 2023-02-10 NOTE — PHYSICAL EXAM
[General Appearance - Alert] : alert [General Appearance - In No Acute Distress] : in no acute distress [General Appearance - Well Nourished] : well nourished [General Appearance - Well Developed] : well developed [Sclera] : the sclera and conjunctiva were normal [Outer Ear] : the ears and nose were normal in appearance [Neck Appearance] : the appearance of the neck was normal [Respiration, Rhythm And Depth] : normal respiratory rhythm and effort [Apical Impulse] : the apical impulse was normal [Heart Rate And Rhythm] : heart rate was normal and rhythm regular [Heart Sounds] : normal S1 and S2 [Bowel Sounds] : normal bowel sounds [Abdomen Soft] : soft [Abdomen Tenderness] : non-tender [No CVA Tenderness] : no ~M costovertebral angle tenderness [Abnormal Walk] : normal gait [Skin Color & Pigmentation] : normal skin color and pigmentation [Skin Turgor] : normal skin turgor [] : no rash [No Focal Deficits] : no focal deficits [Oriented To Time, Place, And Person] : oriented to person, place, and time [Impaired Insight] : insight and judgment were intact [Affect] : the affect was normal [FreeTextEntry1] : \par 1+ edema to midshin\par \par 1+ edema to midshin\par 1+ edema midshin b/l

## 2023-02-10 NOTE — HISTORY OF PRESENT ILLNESS
[FreeTextEntry1] : Here for follow up\par Delivered 1/7/22 for increasing proteinuria/cr\par Had KATIE during hospital stay, cr on DC 1.8\par Is off Labetalol now\par On Amlodipine 10mg at bedtime - /80s 3-4x/week\par  \par Reports foamy urine is better; some leg swelling\par Still is breastfeeding approx 22-24oz/day (650ml); \par hydrates approx 1 gallon per day\par Admits to some post partum saddness to adjusting to new baby; Has mentioned it to OB\par Other ROS neg \par \par

## 2023-02-10 NOTE — ASSESSMENT
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 She was also started on tacro and MMF. Tacrolimus was 3/3 tapered and she was weaned off of it summer 2021 due to concern for 'interstitial nephritis.' Was on Mycophenolate 2 tabs bid which she didn’t tolerate due to GI side effects, and was changed to Myfortic with which she still had diarrhea, vomiting; Then transitioned 3 mos later to myfortic 320mg bid which she didn’t tolerate and it was switched to Imuran in March 2021. \par \par -Creatinine hx per pt: \par 2019 cr 1.7-1.9\par 2020 cr 2.3 at time of biopsy\par cr downtrended with treatment 1.9 Dec 2020\par cr 1.5 2021 (3 tabs aza)\par cr 1.6-1.7 2022 when aza decreased and approx for one year it has been stable \par -cr July 2022 1.4 \par -cr Aug 2022 1.3\par -cr sept 1.43\par -cr oct 1.40\par -cr nov with OB 1.49\par -Nov 9th 1.43\par -nov 22 1.46\par -Dec 1.43\par -Dec 1.69\par -cr mild uptrend and proteinuria to approx 600mg-700mg (from stable 300mg) so she was sent to hospital for delivery; Had KATIE in hospital with downtrending cr to 1.79\par -creatinine since DC postpartum mild uptrending 1.89 to 2.08\par -will repeat cr today ? fluctuations in volume status or actual increase which then may warrant a renal biopsy to see degree of scarring as well as if any treatment would be needed; \par -cont Plaquenil 200 qD, Imuran 100mg/day\par  \par #Non nephrotic range proteinuria- \par june 2020 prot/cr 1.5g\par dec 29 2020 prot.cr 0.1\par june 2022 0.1\par July 0.3\par July 0.2\par August 0.7 \par August 0.2\par Sept 0.7 \par oct 0.2\par nov 0.3 \par dec 0.7\par dec 0.6\par postpartum: \par Jan 0.8 prot/cr\par Feb 0.7 prot/cr\par   \par \par #SLE-Pt has had negative lupus serologies in the past & lupus serologies currently -ve \par #HTN- on Amlodipine 10mg daily; Monitor BP\par  #Anemia-stable;  on iron and b12; hapto which was low throughout pregnancy now normalized\par #serology sent at dr forrest office July 2022- mild phosphotidylserine + and + silica clotting but neg DRVVT\par off Asa per Rheum\par \par \par

## 2023-02-13 ENCOUNTER — NON-APPOINTMENT (OUTPATIENT)
Age: 33
End: 2023-02-13

## 2023-02-13 LAB
ALBUMIN SERPL ELPH-MCNC: 4 G/DL
ANION GAP SERPL CALC-SCNC: 13 MMOL/L
BUN SERPL-MCNC: 40 MG/DL
CALCIUM SERPL-MCNC: 10.1 MG/DL
CHLORIDE SERPL-SCNC: 105 MMOL/L
CO2 SERPL-SCNC: 21 MMOL/L
CREAT SERPL-MCNC: 1.85 MG/DL
EGFR: 37 ML/MIN/1.73M2
GLUCOSE SERPL-MCNC: 73 MG/DL
PHOSPHATE SERPL-MCNC: 5.2 MG/DL
POTASSIUM SERPL-SCNC: 4.7 MMOL/L
SODIUM SERPL-SCNC: 139 MMOL/L

## 2023-02-22 ENCOUNTER — APPOINTMENT (OUTPATIENT)
Dept: OBGYN | Facility: CLINIC | Age: 33
End: 2023-02-22
Payer: COMMERCIAL

## 2023-02-22 VITALS
DIASTOLIC BLOOD PRESSURE: 81 MMHG | BODY MASS INDEX: 31.41 KG/M2 | WEIGHT: 160 LBS | SYSTOLIC BLOOD PRESSURE: 122 MMHG | HEIGHT: 60 IN

## 2023-02-22 DIAGNOSIS — M32.9 OTHER SPECIFIED DISEASES AND CONDITIONS COMPLICATING PREGNANCY: ICD-10-CM

## 2023-02-22 DIAGNOSIS — R63.5 ABNORMAL WEIGHT GAIN: ICD-10-CM

## 2023-02-22 DIAGNOSIS — O99.891 OTHER SPECIFIED DISEASES AND CONDITIONS COMPLICATING PREGNANCY: ICD-10-CM

## 2023-02-22 DIAGNOSIS — O26.833: ICD-10-CM

## 2023-02-22 DIAGNOSIS — N94.2 VAGINISMUS: ICD-10-CM

## 2023-02-22 DIAGNOSIS — O10.919 UNSPECIFIED PRE-EXISTING HYPERTENSION COMPLICATING PREGNANCY, UNSPECIFIED TRIMESTER: ICD-10-CM

## 2023-02-22 DIAGNOSIS — O99.019 ANEMIA COMPLICATING PREGNANCY, UNSPECIFIED TRIMESTER: ICD-10-CM

## 2023-02-22 DIAGNOSIS — Z34.83 ENCOUNTER FOR SUPERVISION OF OTHER NORMAL PREGNANCY, THIRD TRIMESTER: ICD-10-CM

## 2023-02-22 DIAGNOSIS — O10.012 PRE-EXISTING ESSENTIAL HYPERTENSION COMPLICATING PREGNANCY, SECOND TRIMESTER: ICD-10-CM

## 2023-02-22 DIAGNOSIS — O26.831: ICD-10-CM

## 2023-02-22 DIAGNOSIS — O26.832: ICD-10-CM

## 2023-02-22 DIAGNOSIS — O09.90 SUPERVISION OF HIGH RISK PREGNANCY, UNSPECIFIED, UNSPECIFIED TRIMESTER: ICD-10-CM

## 2023-02-22 PROCEDURE — 0503F POSTPARTUM CARE VISIT: CPT

## 2023-02-23 PROBLEM — O10.012 PRE-EXISTING ESSENTIAL HYPERTENSION AFFECTING PREGNANCY IN SECOND TRIMESTER: Status: RESOLVED | Noted: 2022-08-22 | Resolved: 2023-02-23

## 2023-02-23 PROBLEM — O09.90 SUPERVISION OF HIGH RISK PREGNANCY, ANTEPARTUM: Status: RESOLVED | Noted: 2022-07-08 | Resolved: 2023-02-23

## 2023-02-23 PROBLEM — O99.891 SYSTEMIC LUPUS ERYTHEMATOSUS AFFECTING PREGNANCY IN SECOND TRIMESTER: Status: RESOLVED | Noted: 2022-10-18 | Resolved: 2023-02-23

## 2023-02-23 PROBLEM — O26.833: Status: RESOLVED | Noted: 2022-11-14 | Resolved: 2023-02-23

## 2023-02-23 PROBLEM — O26.832: Status: RESOLVED | Noted: 2022-08-22 | Resolved: 2023-02-23

## 2023-02-23 PROBLEM — O10.919 CHRONIC HYPERTENSION AFFECTING PREGNANCY: Status: RESOLVED | Noted: 2022-07-20 | Resolved: 2023-02-23

## 2023-02-23 PROBLEM — O99.891 SYSTEMIC LUPUS ERYTHEMATOSUS AFFECTING PREGNANCY IN THIRD TRIMESTER: Status: RESOLVED | Noted: 2022-11-14 | Resolved: 2023-02-23

## 2023-02-23 PROBLEM — Z34.83 ENCOUNTER FOR SUPERVISION OF OTHER NORMAL PREGNANCY, THIRD TRIMESTER: Status: RESOLVED | Noted: 2022-11-02 | Resolved: 2023-02-23

## 2023-02-23 PROBLEM — O26.831: Status: RESOLVED | Noted: 2022-07-20 | Resolved: 2023-02-23

## 2023-02-23 PROBLEM — O99.019 ANEMIA DURING PREGNANCY: Status: RESOLVED | Noted: 2022-10-18 | Resolved: 2023-02-23

## 2023-02-23 PROBLEM — O99.891: Status: RESOLVED | Noted: 2022-07-20 | Resolved: 2023-02-23

## 2023-02-23 NOTE — HISTORY OF PRESENT ILLNESS
[Delivery Date: ___] : on [unfilled] [] : delivered by vaginal delivery [Male] : Delivery History: baby boy [Wt. ___] : weighing [unfilled] [Breastfeeding] : currently nursing [Back to Normal] : is back to normal in size [Normal] : the vagina was normal [Examination Of The Breasts] : breasts are normal [Doing Well] : is doing well [No Sign of Infection] : is showing no signs of infection [Excellent Pain Control] : has excellent pain control [None] : None [Chills] : no chills [Dysuria] : no dysuria [Fever] : no fever [FreeTextEntry8] : 6 week PPV.  [de-identified] : IOL for worsening renal fxn, evolving sPEC. Delivered by   [de-identified] : Pt saw her nephrologist on 2/8/23. She reports discomfort, tightness, within her vaginal and perineum area. Pt was seeing pelvic PT for vaginismus prior to delivery. Vaginal bleeding has resolved. Pt is still experiencing mood sxs, follows with therapist. She reports gaining weight since she delivered.  [de-identified] : abd soft, nt, nd.  [de-identified] : 33 yo s/p  on 23. Stable.  [de-identified] : Discussed option of vaginal estrogen for pt's sxs, rx given for vaginal estrogen QHS. Referral given for pelvic floor PT. Thyroid bloodwork today.  Pt cleared to resume all normal activity. Contraceptive counseling given, pt to use condoms. RTO 3 months for annual.

## 2023-02-23 NOTE — END OF VISIT
[FreeTextEntry3] : I, Iveth Haley, acted as a scribe on behalf of Dr. Marina Srivastava on 02/22/2023. \par \par All medical entries made by the scribe where at my, Dr. Marina Srivastava, direction and personally dictated by me on 02/22/2023. I have reviewed the chart and agree that the record accurately reflects my personal performance of the history, physical exam, assessment, and plan. I have also personally directed, reviewed and agreed with the chart.\par

## 2023-02-24 ENCOUNTER — TRANSCRIPTION ENCOUNTER (OUTPATIENT)
Age: 33
End: 2023-02-24

## 2023-02-24 LAB — TSH SERPL-ACNC: 2.08 UIU/ML

## 2023-02-28 ENCOUNTER — APPOINTMENT (OUTPATIENT)
Dept: INTERNAL MEDICINE | Facility: CLINIC | Age: 33
End: 2023-02-28
Payer: COMMERCIAL

## 2023-02-28 DIAGNOSIS — Z63.8 OTHER SPECIFIED PROBLEMS RELATED TO PRIMARY SUPPORT GROUP: ICD-10-CM

## 2023-02-28 PROCEDURE — 99214 OFFICE O/P EST MOD 30 MIN: CPT | Mod: 95

## 2023-02-28 SDOH — SOCIAL STABILITY - SOCIAL INSECURITY: OTHER SPECIFIED PROBLEMS RELATED TO PRIMARY SUPPORT GROUP: Z63.8

## 2023-03-01 ENCOUNTER — TRANSCRIPTION ENCOUNTER (OUTPATIENT)
Age: 33
End: 2023-03-01

## 2023-03-01 PROBLEM — Z63.8 STRESS DUE TO FAMILY TENSION: Status: ACTIVE | Noted: 2023-03-01

## 2023-03-01 NOTE — ASSESSMENT
[FreeTextEntry1] : 30 yo F with h/o SLE/lupus nephritis class III, hypertension, now ~ 7 weeks post-partum \par \par Post partum: Status post induced vaginal delivery at 35.6 weeks on 23 due to worsening renal function and elevated blood pressure. baby boy 5lbs 3 oz.,  Apgar scores 9/9.  \par Since delivery, has seen her OB, nephrologist, and rheumatologist.\par .RE OB: some wt gain since delivery - TSH checked and nl.\par RE CKD: Had KATIE during hospitalization, creatinine upon discharge 1.8- no change in care at most recent visit\par RE HTN: Now off labetalol and on amlodipine 10 mg at bedtime.  Blood pressures have been running in target range for the last 3 to 4 weeks\par RE BH: She is requesting referral to therapist for issues with mood and family stressors she is dealing with. \par Discussed options - referring to our   liaison. Contact info provided and I will also reach out to facilitate warm handoff. \par \par

## 2023-03-01 NOTE — HISTORY OF PRESENT ILLNESS
[Home] : at home, [unfilled] , at the time of the visit. [Medical Office: ([facility])___] : at the medical office located in  [Verbal consent obtained from patient] : the patient, [unfilled] [de-identified] : 30 yo F with h/o SLE/lupus nephritis class III, hypertension, now ~ 7 weeks post-partum\par Chart reviewed today in preparation for visit. \par \par s/p COVID infection  week of December\par Status post induced vaginal delivery at 35.6 weeks on 23 due to worsening renal function and elevated blood pressure. baby boy 5lbs 3 oz.,  Apgar scores 9/9.  \par Since delivery, has seen her OB, nephrologist, and rheumatologist.\par RE CKD: Had KATIE during hospitalization, creatinine upon discharge 1.8- no change in care at most recent visit\par RE HTN: Now off labetalol and on amlodipine 10 mg at bedtime.  Blood pressures have been running in target range for the last 3 to 4 weeks.\par RE OB: some wt gain since delivery - TSH checked and nl.\par \par She is requesting referral to therapist for issues with mood and family stressors she is dealing with. \par Part of it is the usual fatigue from being up frequently during the night, demands of infant care, etc - but in addition, currently living with in-laws (supposedly to help) but describes the interfamily relations as difficult.  Feels her mother-in-law has been undermining her efforts, criticizing her choices, generally making her doubt herself rather than be supportive. She feels conflicted with cultural imperative to honor parent's/in-laws and more modern personal values that differ from in-laws.  stuck in the middle betw wife and parents. \par \par

## 2023-03-02 ENCOUNTER — TRANSCRIPTION ENCOUNTER (OUTPATIENT)
Age: 33
End: 2023-03-02

## 2023-03-06 ENCOUNTER — APPOINTMENT (OUTPATIENT)
Dept: RHEUMATOLOGY | Facility: CLINIC | Age: 33
End: 2023-03-06

## 2023-03-13 ENCOUNTER — TRANSCRIPTION ENCOUNTER (OUTPATIENT)
Age: 33
End: 2023-03-13

## 2023-03-16 ENCOUNTER — APPOINTMENT (OUTPATIENT)
Dept: OPHTHALMOLOGY | Facility: CLINIC | Age: 33
End: 2023-03-16

## 2023-04-10 ENCOUNTER — TRANSCRIPTION ENCOUNTER (OUTPATIENT)
Age: 33
End: 2023-04-10

## 2023-04-11 ENCOUNTER — TRANSCRIPTION ENCOUNTER (OUTPATIENT)
Age: 33
End: 2023-04-11

## 2023-04-12 ENCOUNTER — APPOINTMENT (OUTPATIENT)
Dept: INTERNAL MEDICINE | Facility: CLINIC | Age: 33
End: 2023-04-12
Payer: COMMERCIAL

## 2023-04-12 VITALS — WEIGHT: 155 LBS | HEIGHT: 62 IN | BODY MASS INDEX: 28.52 KG/M2

## 2023-04-12 DIAGNOSIS — K21.9 GASTRO-ESOPHAGEAL REFLUX DISEASE W/OUT ESOPHAGITIS: ICD-10-CM

## 2023-04-12 DIAGNOSIS — R11.10 VOMITING, UNSPECIFIED: ICD-10-CM

## 2023-04-12 PROCEDURE — 99214 OFFICE O/P EST MOD 30 MIN: CPT | Mod: 95

## 2023-04-12 NOTE — HISTORY OF PRESENT ILLNESS
[Home] : at home, [unfilled] , at the time of the visit. [Medical Office: ([facility])___] : at the medical office located in  [Verbal consent obtained from patient] : the patient, [unfilled] [FreeTextEntry1] : GERD sx and postprandial regurgitation [de-identified] : 30 yo F with h/o SLE/lupus nephritis class III, hypertension, now ~ 12 weeks postpartum\par Chart reviewed today in preparation for visit. \par \par Ms POWERS is concerned about staying on PPI for extended period of time. Wants to transition to H2 blocker.  Need to clarify sx to best manage transition. Most recent est GFR  37 (in February). \par Ms Mclaughlin reports that since her third trimester of pregnancy, has been having fairly severe reflux symptoms.  Improved significantly when she first delivered and was only really eating very lightly and few solids.  When she resumed a normal diet, began having episodes reflux along with regurgitation of her meals approximately 2 hours after eating.  Tends to happen most with high-protein meals, especially meat.  She has noticed that famotidine seems to work better to control the symptoms then when she uses Protonix.\par She reports episodes really involve 2 sets of symptoms:\par First is regurgitation as described above\par Second is a burning sensation that she feels going up the center of her esophagus.\par As a result of this regurgitation, she finds herself eating less to avoid the symptoms and has lost a couple of pounds.  Reports her current weight is 155 pounds and she is 5 foot 2 inches tall.\par As result of these episodes, she has cut way back on meat in her diet and is mostly eating carbs, vegetables, and fruit.  She sometimes eats fish.\par Of note she had a EGD 2 years ago while still living in [place].  She feels her symptoms of reflux are exacerbated by use of azathioprine.  At the time had gone from taking it twice daily to 3 times daily and reflux symptoms flared.  Referred to GI and she\par saw Dr. Shannan Rapp on 10/21/2021 and had the EGD by Dr. Lynette Rowan  on 11/22/2021.\par Report indicated mild gastritis and H. pylori negative.\par When she cut back a azathiopine to twice daily again, the symptoms improved.

## 2023-04-12 NOTE — ASSESSMENT
[FreeTextEntry1] : 32 yo F with h/o SLE/lupus nephritis class III, hypertension, now ~ 12 weeks postpartum\par Now w/ GERD and frequent postprandial regurgitation. only partially controlled w/ PPI.  Ms PRIYA is concerned about staying on PPI for extended period of time. Wants to transition to H2 blocker.  Most recent est GFR  37 (in February). \par \par RE postprandial reflux & regurgitation: \par Unclear etiology although seems suggestive of combination GERD/gastroparesis. \par \par since her third trimester of pregnancy, has been having fairly severe reflux symptoms.  Improved postpartum initially when eating very light liquid diet.  When she resumed a normal diet, began having episodes reflux along with regurgitation of her meals approximately 2 hours after eating.  Tends to happen most with high-protein meals, especially meat.  She has noticed that famotidine seems to work better to control the symptoms then when she uses Protonix.\par As a result, she finds herself eating less to avoid the symptoms and has lost a couple of pounds.  Has cut way back on meat in her diet and is mostly eating carbs, vegetables, fruit, and sometimes fish.\par EGD 2 years ago showed mild gastritis and H. pylori negative.\par Sx also may be related to use of azathioprine as in the past, they flared with higher dose and improved with lower dose.\par \par We will try to transition to famotidine as it appears to control the symptoms better than the PPI however will need to be renally dosed.\par Based on GFR of 37, will begin famotidine 20 mg daily (equivalent dosing to 20 mg twice daily).\par Advised multiple small meals spaced over the day rather than 3 normal sized meals. \par Follow-up 2 to 4 weeks to assess response.\par Likely will need referral to GI for further evaluation.  Consider gastric emptying study to evaluate for gastroparesis.\par We will also reach out to renal to see if higher doses of famotidine acceptable.\par \par \par

## 2023-04-19 ENCOUNTER — NON-APPOINTMENT (OUTPATIENT)
Age: 33
End: 2023-04-19

## 2023-04-25 ENCOUNTER — APPOINTMENT (OUTPATIENT)
Dept: NEPHROLOGY | Facility: CLINIC | Age: 33
End: 2023-04-25
Payer: COMMERCIAL

## 2023-04-25 VITALS
WEIGHT: 160.93 LBS | BODY MASS INDEX: 29.62 KG/M2 | DIASTOLIC BLOOD PRESSURE: 83 MMHG | HEIGHT: 62 IN | SYSTOLIC BLOOD PRESSURE: 122 MMHG | HEART RATE: 71 BPM | OXYGEN SATURATION: 95 % | TEMPERATURE: 97.6 F

## 2023-04-25 VITALS — DIASTOLIC BLOOD PRESSURE: 82 MMHG | SYSTOLIC BLOOD PRESSURE: 126 MMHG

## 2023-04-25 PROCEDURE — 99214 OFFICE O/P EST MOD 30 MIN: CPT

## 2023-04-26 NOTE — ASSESSMENT
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 She was also started on tacro and MMF. Tacrolimus was 3/3 tapered and she was weaned off of it summer 2021 due to concern for 'interstitial nephritis.' Was on Mycophenolate 2 tabs bid which she didn’t tolerate due to GI side effects, and was changed to Myfortic with which she still had diarrhea, vomiting; Then transitioned 3 mos later to myfortic 320mg bid which she didn’t tolerate and it was switched to Imuran in March 2021. \par \par -Creatinine hx per pt: \par 2019 cr 1.7-1.9\par 2020 cr 2.3 at time of biopsy\par cr downtrended with treatment 1.9 Dec 2020\par cr 1.5 2021 (3 tabs aza)\par cr 1.6-1.7 2022 when aza decreased and approx for one year it has been stable \par -cr July 2022 1.4 \par -cr Aug 2022 1.3\par -cr sept 1.43\par -cr oct 1.40\par -cr nov with OB 1.49\par -Nov 9th 1.43\par -nov 22 1.46\par -Dec 1.43\par -Dec 1.69\par -cr mild uptrend and proteinuria to approx 600mg-700mg (from stable 300mg) so she was sent to hospital for delivery; Had KATIE in hospital with downtrending cr to 1.79; \par -creatinine since DC postpartum mild uptrending 1.89 to 2.08 then on last labs in Feb 2023 1.85, prot/cr 0.7 \par Possibility this is new baseline \par -will repeat cr today \par - will consider re biopsy to see degree of scarring, ?need for imuran vs other i any treatment would be needed if there is active lupus; \par -cont Plaquenil 200 qD, Imuran 100mg/day\par -needs ACE or ARB but she is breastfeeding\par  \par #Non nephrotic range proteinuria- \par june 2020 prot/cr 1.5g\par dec 29 2020 prot.cr 0.1\par june 2022 0.1\par July 0.3\par July 0.2\par August 0.7 \par August 0.2\par Sept 0.7 \par oct 0.2\par nov 0.3 \par dec 0.7\par dec 0.6\par postpartum: \par Jan 0.8 prot/cr\par Feb 0.7 prot/cr\par repeat today\par \par #SLE-Pt has had negative lupus serologies in the past & lupus serologies negative during pregnancy; check c3,c4,dsdna today\par #HTN- on Amlodipine 10mg daily; Monitor BP\par  #Anemia-stable; hapto was low during pregnancy; \par #serology sent at dr forrest office July 2022- mild phosphotidylserine + and + silica clotting but neg DRVVT\par off Asa per Rheum\par \par

## 2023-04-26 NOTE — PHYSICAL EXAM
[General Appearance - Alert] : alert [General Appearance - In No Acute Distress] : in no acute distress [General Appearance - Well Nourished] : well nourished [General Appearance - Well Developed] : well developed [Sclera] : the sclera and conjunctiva were normal [Outer Ear] : the ears and nose were normal in appearance [Neck Appearance] : the appearance of the neck was normal [Respiration, Rhythm And Depth] : normal respiratory rhythm and effort [Apical Impulse] : the apical impulse was normal [Heart Rate And Rhythm] : heart rate was normal and rhythm regular [Heart Sounds] : normal S1 and S2 [Bowel Sounds] : normal bowel sounds [Abdomen Soft] : soft [Abdomen Tenderness] : non-tender [No CVA Tenderness] : no ~M costovertebral angle tenderness [Abnormal Walk] : normal gait [Skin Color & Pigmentation] : normal skin color and pigmentation [Skin Turgor] : normal skin turgor [] : no rash [No Focal Deficits] : no focal deficits [Oriented To Time, Place, And Person] : oriented to person, place, and time [Impaired Insight] : insight and judgment were intact [Affect] : the affect was normal [FreeTextEntry1] : trace\par 1+ edema to midshin\par \par 1+ edema to midshin\par 1+ edema midshin b/l

## 2023-04-26 NOTE — HISTORY OF PRESENT ILLNESS
[FreeTextEntry1] : Here for follow up\par 3.5 post partum\par She is still almost soley breastfeeding; supplements 1-2x/d\par Skipped repeat labs for March because she forgot\par  \par She is hydrating well and feeling well overall; \par c/o hand swelling with walking \par no leg swelling with walking\par decrease in foamy or bubbly urine\par no burning or pain with urination\par no sob\par other ROS neg\par \par Meds:\par Imuran 100mg daily\par Pepcid 20mg\par taking herself off Protonix 20mg qod\par Plaquenil 200mg daily\par prenatal, iron, b12\par Amlodipine 10mg daily\par \par

## 2023-04-27 ENCOUNTER — NON-APPOINTMENT (OUTPATIENT)
Age: 33
End: 2023-04-27

## 2023-04-27 LAB
25(OH)D3 SERPL-MCNC: 39.8 NG/ML
ALBUMIN SERPL ELPH-MCNC: 4.3 G/DL
ANION GAP SERPL CALC-SCNC: 16 MMOL/L
APPEARANCE: CLEAR
BACTERIA: NEGATIVE /HPF
BASOPHILS # BLD AUTO: 0.02 K/UL
BASOPHILS NFR BLD AUTO: 0.4 %
BILIRUBIN URINE: NEGATIVE
BLOOD URINE: NEGATIVE
BUN SERPL-MCNC: 32 MG/DL
C3 SERPL-MCNC: 131 MG/DL
C4 SERPL-MCNC: 36 MG/DL
CALCIUM SERPL-MCNC: 10 MG/DL
CAST: 1 /LPF
CHLORIDE SERPL-SCNC: 102 MMOL/L
CO2 SERPL-SCNC: 18 MMOL/L
COLOR: YELLOW
CREAT SERPL-MCNC: 1.8 MG/DL
CREAT SPEC-SCNC: 59 MG/DL
CREAT/PROT UR: 0.7 RATIO
DSDNA AB SER-ACNC: <12 IU/ML
EGFR: 38 ML/MIN/1.73M2
EOSINOPHIL # BLD AUTO: 0.14 K/UL
EOSINOPHIL NFR BLD AUTO: 2.8 %
EPITHELIAL CELLS: 2 /HPF
ESTIMATED AVERAGE GLUCOSE: 91 MG/DL
FERRITIN SERPL-MCNC: 36 NG/ML
GLUCOSE QUALITATIVE U: NEGATIVE MG/DL
GLUCOSE SERPL-MCNC: 82 MG/DL
HBA1C MFR BLD HPLC: 4.8 %
HCT VFR BLD CALC: 37.5 %
HGB BLD-MCNC: 12.2 G/DL
IMM GRANULOCYTES NFR BLD AUTO: 0.2 %
IRON SATN MFR SERPL: 22 %
IRON SERPL-MCNC: 71 UG/DL
KETONES URINE: NEGATIVE MG/DL
LEUKOCYTE ESTERASE URINE: ABNORMAL
LYMPHOCYTES # BLD AUTO: 1.28 K/UL
LYMPHOCYTES NFR BLD AUTO: 25.4 %
MAN DIFF?: NORMAL
MCHC RBC-ENTMCNC: 29.3 PG
MCHC RBC-ENTMCNC: 32.5 GM/DL
MCV RBC AUTO: 89.9 FL
MICROSCOPIC-UA: NORMAL
MONOCYTES # BLD AUTO: 0.35 K/UL
MONOCYTES NFR BLD AUTO: 6.9 %
NEUTROPHILS # BLD AUTO: 3.24 K/UL
NEUTROPHILS NFR BLD AUTO: 64.3 %
NITRITE URINE: NEGATIVE
PH URINE: 5.5
PHOSPHATE SERPL-MCNC: 5 MG/DL
PLATELET # BLD AUTO: 236 K/UL
POTASSIUM SERPL-SCNC: 5 MMOL/L
PROT UR-MCNC: 43 MG/DL
PROTEIN URINE: 30 MG/DL
RBC # BLD: 4.17 M/UL
RBC # FLD: 13.4 %
RED BLOOD CELLS URINE: 2 /HPF
SODIUM SERPL-SCNC: 136 MMOL/L
SPECIFIC GRAVITY URINE: 1.01
TIBC SERPL-MCNC: 320 UG/DL
UIBC SERPL-MCNC: 249 UG/DL
UROBILINOGEN URINE: 0.2 MG/DL
WBC # FLD AUTO: 5.04 K/UL
WHITE BLOOD CELLS URINE: 3 /HPF

## 2023-05-25 ENCOUNTER — APPOINTMENT (OUTPATIENT)
Dept: OBGYN | Facility: CLINIC | Age: 33
End: 2023-05-25

## 2023-06-13 ENCOUNTER — APPOINTMENT (OUTPATIENT)
Dept: OPHTHALMOLOGY | Facility: CLINIC | Age: 33
End: 2023-06-13
Payer: COMMERCIAL

## 2023-06-13 ENCOUNTER — NON-APPOINTMENT (OUTPATIENT)
Age: 33
End: 2023-06-13

## 2023-06-13 PROCEDURE — 92134 CPTRZ OPH DX IMG PST SGM RTA: CPT

## 2023-06-13 PROCEDURE — 76514 ECHO EXAM OF EYE THICKNESS: CPT

## 2023-06-13 PROCEDURE — 92004 COMPRE OPH EXAM NEW PT 1/>: CPT

## 2023-06-13 PROCEDURE — 92083 EXTENDED VISUAL FIELD XM: CPT

## 2023-06-16 ENCOUNTER — NON-APPOINTMENT (OUTPATIENT)
Age: 33
End: 2023-06-16

## 2023-06-29 LAB
ALBUMIN SERPL ELPH-MCNC: 4.2 G/DL
ANION GAP SERPL CALC-SCNC: 13 MMOL/L
APPEARANCE: CLEAR
APTT BLD: 38.4 SEC
BACTERIA: NEGATIVE /HPF
BILIRUBIN URINE: NEGATIVE
BLOOD URINE: NEGATIVE
BUN SERPL-MCNC: 31 MG/DL
C3 SERPL-MCNC: 131 MG/DL
C4 SERPL-MCNC: 31 MG/DL
CALCIUM SERPL-MCNC: 9.4 MG/DL
CAST: 0 /LPF
CHLORIDE SERPL-SCNC: 103 MMOL/L
CO2 SERPL-SCNC: 22 MMOL/L
COLOR: YELLOW
CREAT SERPL-MCNC: 1.92 MG/DL
CREAT SPEC-SCNC: 39 MG/DL
CREAT/PROT UR: 0.7 RATIO
DSDNA AB SER-ACNC: <12 IU/ML
EGFR: 36 ML/MIN/1.73M2
EPITHELIAL CELLS: 2 /HPF
FERRITIN SERPL-MCNC: 55 NG/ML
GLUCOSE QUALITATIVE U: NEGATIVE MG/DL
GLUCOSE SERPL-MCNC: 94 MG/DL
INR PPP: 0.97 RATIO
IRON SATN MFR SERPL: 27 %
IRON SERPL-MCNC: 75 UG/DL
KETONES URINE: NEGATIVE MG/DL
LEUKOCYTE ESTERASE URINE: NEGATIVE
MICROSCOPIC-UA: NORMAL
NITRITE URINE: NEGATIVE
PH URINE: 6
PHOSPHATE SERPL-MCNC: 4.2 MG/DL
POTASSIUM SERPL-SCNC: 4.8 MMOL/L
PROT UR-MCNC: 29 MG/DL
PROTEIN URINE: 30 MG/DL
PT BLD: 11.3 SEC
RED BLOOD CELLS URINE: 0 /HPF
SODIUM SERPL-SCNC: 138 MMOL/L
SPECIFIC GRAVITY URINE: 1.01
TIBC SERPL-MCNC: 280 UG/DL
UIBC SERPL-MCNC: 204 UG/DL
UROBILINOGEN URINE: 0.2 MG/DL
WHITE BLOOD CELLS URINE: 1 /HPF

## 2023-07-03 ENCOUNTER — OUTPATIENT (OUTPATIENT)
Dept: OUTPATIENT SERVICES | Facility: HOSPITAL | Age: 33
LOS: 1 days | Discharge: HOME | End: 2023-07-03

## 2023-07-03 ENCOUNTER — APPOINTMENT (OUTPATIENT)
Dept: HEMATOLOGY ONCOLOGY | Facility: CLINIC | Age: 33
End: 2023-07-03
Payer: COMMERCIAL

## 2023-07-03 VITALS
WEIGHT: 156.08 LBS | OXYGEN SATURATION: 98 % | HEART RATE: 103 BPM | DIASTOLIC BLOOD PRESSURE: 85 MMHG | BODY MASS INDEX: 28.55 KG/M2 | TEMPERATURE: 97.5 F | RESPIRATION RATE: 16 BRPM | SYSTOLIC BLOOD PRESSURE: 124 MMHG

## 2023-07-03 DIAGNOSIS — K08.409 PARTIAL LOSS OF TEETH, UNSPECIFIED CAUSE, UNSPECIFIED CLASS: Chronic | ICD-10-CM

## 2023-07-03 DIAGNOSIS — D64.9 ANEMIA, UNSPECIFIED: ICD-10-CM

## 2023-07-03 PROCEDURE — 99214 OFFICE O/P EST MOD 30 MIN: CPT

## 2023-07-07 ENCOUNTER — NON-APPOINTMENT (OUTPATIENT)
Age: 33
End: 2023-07-07

## 2023-07-07 LAB
APTT BLD: 34.7 SEC
DEPRECATED CARDIOLIPIN IGA SER: <5 APL
FACT IX ACT/NOR PPP: 145 %
FACT VIII ACT/NOR PPP: 230 %
FACT XI ACT/NOR PPP: 138 %
FACT XII PPP CHRO-ACNC: 161 %
INR PPP: 0.97 RATIO
PT BLD: 11.4 SEC
REPTILASE TIME: 15 SEC
SILICA CLOTTING TIME INTERPRETATION: ABNORMAL
SILICA CLOTTING TIME S/C: 1.28 RATIO
VWF AG PPP IA-ACNC: 199 %
VWF:RCO ACT/NOR PPP PL AGG: 215 %

## 2023-07-07 NOTE — HISTORY OF PRESENT ILLNESS
[de-identified] : Daron Mclaughlin is a 32 yo F. with h/o HTN, iron deficiency, SLE, lupus nephritis class 3 on renal biopsy 2020- currently on Imuran and Plaquenil. Patient presents for evaluation of anemia at 22 weeks gestation, BROOK 2023. The patient's hemoglobin dropped from 11 g/dL in July to 9.8 g/dL in August. Most recent labs from 22 shows hemoglobin of 9.6 g/dL, .4 fl, RDW 15.0%,  K/uL, normal WBC and differential, creatinine 1.43 mg/dL, Ferritin 23 ng/mL, iron sat 24%,  U/L, serum haptoglobin < 20 mg/dL and [id number] activity > 100%. Back in July Vitamin B12 was 413 pg/mL, Folate was 20 ng/mL, Cristin negative. Patient also had a positive LA on silica clotting time in July, DRVVT was negative, anticardiolipin and beta 2 glycoprotein antibody testing was negative. She has no h/o VTE and no h/o miscarriage. This is her first pregnancy. \par \par Patient initially treated for iron and B12 deficiency anemia related to pregnancy.  This had helped Hg recover, then in  had been worked up for a prolonged PTT, remote history of some bleeding with a renal biopsy in .  \par \par PMH: HTN, iron deficiency, SLE, lupus nephritis class 3 on renal biopsy 2020- currently on Imuran and Plaquenil\par PSH: Renal biopsy and tooth extraction\par Hospitalization: denies \par GYN: \par SH: Denies h/o smoking and alcohol use. she's a Pharmacist. . \par FMH: mother has h/o anemia- does not know what type. Father has h/o Hodgkins lymphoma\par Medications: see medication reconciliation\par Allergies: NKDA\par Healthcare Maintenance\par PCP: planning to see Dr. Gee soon to establish. \par GYN: up to date\par COVID: received the Moderna Covid-19 vaccines with booster. \par  [de-identified] : Patient presents for hematologic clearance due to  a history of bleeding disorder.  In 2020 patient had a renal biopsy that had bled a lot, then a pregnancy and delivery where she was transfused. Patient twas never formally diagnosis worth a bleeding diastasis.  \par \par PTT was 36.8 on February 3rd the repeat June 16th was 38.4.  was previously normal July of last year at 33.9.  \par \par Patient

## 2023-07-07 NOTE — ASSESSMENT
[FreeTextEntry1] : This is 32 year old woman with a history of iron deficiency anemia, SLE, lupus nephritis confirmed on renal biopsy, 8/2020, currently on Imuran and Plaquenil. Patient presents for the evaluation of an anemia at 22 weeks gestation which was treated with B12 and IV iron. Recovered quickly.  CBC June 16th was quite normal with HG 12. Patient now presents for evaluation of a prolonged PTT 38 seconds prior to a renal biopsy. patient had some bleeding associated with a previous renal biopsy and again during pregnancy. Was transfused 1 unit early this year.  \par \par Denies anyone other major history of bleeding or bruising.  Feels well and has no acute complaints.  PTT in 2022 was normal at 33 seconds then prolonged to 36.4 then 38.8 this past June.  Will repeat mixing study along with VWF,  F VIII, IX, XI, XII activity and DRVVT with silica ca clotting time to rule out bleeding anastasis the probable lupus anticoagulant. In 2022 we had run a similar workup and she did not have one, but she could have developed  this in the past year.  If this prolonged PPT is form the lupus anticoagulant then this would not be considered a bleeding diastatics and patient would not require factor for upcoming biopsy.  \par \par Addendum 7/7/23\par Prolonged PTT coagulopathy workup returned. VWF Ag 199% Act 215%, Factor VIII 230% Factor IX activity 145%, % Factor XII activity 161%.  No factor activity deficits.  PTT normalized to 34.7.\par \par Silica clotting time was positive.  It is probable to surmise that the prolonged PTT was not from a coagulopathy, but due to the presence of a circulating lupus anticoagulant type inhibitor as evidenced by the Silica clotting time being abnormal.  This silica clotting time was also abnormal at rheumatology in 2022.  This is actually not a coagulopathy, and does not make criteria for a antiphospholipids syndrome either given no history of a thrombosis. It is something that will periodically prolong the PTT.  \par \par Patient is hematologically cleared for renal biopsy.

## 2023-07-10 ENCOUNTER — APPOINTMENT (OUTPATIENT)
Dept: RHEUMATOLOGY | Facility: CLINIC | Age: 33
End: 2023-07-10
Payer: COMMERCIAL

## 2023-07-10 VITALS
HEIGHT: 62 IN | WEIGHT: 155 LBS | DIASTOLIC BLOOD PRESSURE: 90 MMHG | SYSTOLIC BLOOD PRESSURE: 125 MMHG | HEART RATE: 98 BPM | BODY MASS INDEX: 28.52 KG/M2

## 2023-07-10 DIAGNOSIS — R53.83 OTHER FATIGUE: ICD-10-CM

## 2023-07-10 PROCEDURE — 99214 OFFICE O/P EST MOD 30 MIN: CPT

## 2023-07-11 PROBLEM — R53.83 FATIGUE: Status: ACTIVE | Noted: 2022-11-17

## 2023-07-11 NOTE — PHYSICAL EXAM
[General Appearance - Alert] : alert [General Appearance - In No Acute Distress] : in no acute distress [Sclera] : the sclera and conjunctiva were normal [Outer Ear] : the ears and nose were normal in appearance [Neck Appearance] : the appearance of the neck was normal [Auscultation Breath Sounds / Voice Sounds] : lungs were clear to auscultation bilaterally [Heart Rate And Rhythm] : heart rate was normal and rhythm regular [Heart Sounds] : normal S1 and S2 [Heart Sounds Gallop] : no gallops [Murmurs] : no murmurs [Heart Sounds Pericardial Friction Rub] : no pericardial rub [Full Pulse] : the pedal pulses are present [Edema] : there was no peripheral edema [Bowel Sounds] : normal bowel sounds [Abdomen Soft] : soft [Abdomen Tenderness] : non-tender [Abdomen Mass (___ Cm)] : no abdominal mass palpated [Cervical Lymph Nodes Enlarged Posterior Bilaterally] : posterior cervical [Cervical Lymph Nodes Enlarged Anterior Bilaterally] : anterior cervical [Supraclavicular Lymph Nodes Enlarged Bilaterally] : supraclavicular [No CVA Tenderness] : no ~M costovertebral angle tenderness [No Spinal Tenderness] : no spinal tenderness [Abnormal Walk] : normal gait [Nail Clubbing] : no clubbing  or cyanosis of the fingernails [Musculoskeletal - Swelling] : no joint swelling seen [Motor Tone] : muscle strength and tone were normal [Skin Color & Pigmentation] : normal skin color and pigmentation [Skin Turgor] : normal skin turgor [] : no rash [Sensation] : the sensory exam was normal to light touch and pinprick [Oriented To Time, Place, And Person] : oriented to person, place, and time [Motor Exam] : the motor exam was normal [Impaired Insight] : insight and judgment were intact [Affect] : the affect was normal

## 2023-07-11 NOTE — HISTORY OF PRESENT ILLNESS
[FreeTextEntry1] : 1.  SLE/LN: Well until  at which time she developed fever and hypertension. She was referred to cardiology who noted proteinuria. There was a delay in seeing a nephrologist, but she eventually was evaluated in .  At that time her creat was 2.3.  A kidney biopsy according to the patient demonstrated class III LN.  She received high dose oral steroids but was intolerant of both MMF and mycophenolate sodium (even at low doses).  She was tried on tacrolimus and azathioprine.  The TAC was stopped after 6 months, but the AZA was continued to present. She stated that her baseline creat has been around 1.8 and that proteinuria disappeared by .  She has not had extra-renal disease. Subsequent years  through  have been quiet. She got through the pregnancy with a slight increase in her baseline creatinine. A repeat kidney biopsy was planned for mid-\par 2.  OB history\par      a. Pregnancy No. 1:  Delivered by  on 2023 at ~36 weeks because of preeclampsia (Boy: Hawavar [5 lbs 3 oz]). She stated that aPL Ab have been mostly negative. On repeat here she had a prolonged SCT. \par 3.  Low haptoglobin: platelets have been normal , and Hb in Oct 2022 = 10.2\par 4.  Antiphospholipid Ab: she has tested positive for a LAC (dRVVT and SCT) \par 5.  Constitutional symptoms: she reported fever, fatigue, chills during the  visit.  While she thought her SLE was flaring, her lab tests were about the same. Await repeat kidney biopsy. \par \par Meds\par  mg/d stopped\par hydroxychloroquine 200 mg/d\par amlodipine 10 mg/d\par azathioprine 100 mg/d\par vits\par iron\par \par Vaccines\par PNVX 23\par Prevnar 13

## 2023-07-11 NOTE — ASSESSMENT
[FreeTextEntry1] : 1.  SLE/LN: Well until  at which time she developed fever and hypertension. She was referred to cardiology who noted proteinuria. There was a delay in seeing a nephrologist, but she eventually was evaluated in .  At that time her creat was 2.3.  A kidney biopsy according to the patient demonstrated class III LN.  She received high dose oral steroids but was intolerant of both MMF and mycophenolate sodium (even at low doses).  She was tried on tacrolimus and azathioprine.  The TAC was stopped after 6 months, but the AZA was continued to present. She stated that her baseline creat has been around 1.8 and that proteinuria disappeared by .  She has not had extra-renal disease. Subsequent years  through  have been quiet. She got through the pregnancy with a slight increase in her baseline creatinine. A repeat kidney biopsy was planned for mid-\par 2.  OB history\par      a. Pregnancy No. 1:  Delivered by  on 2023 at ~36 weeks because of preeclampsia (Boy: Hawavar [5 lbs 3 oz]). She stated that aPL Ab have been mostly negative. On repeat here she had a prolonged SCT. \par 3.  Low haptoglobin: platelets have been normal , and Hb in Oct 2022 = 10.2\par 4.  Antiphospholipid Ab: she has tested positive for a LAC (dRVVT and SCT) \par 5.  Constitutional symptoms: she reported fever, fatigue, chills during the  visit.  While she thought her SLE was flaring, her lab tests were about the same. Await repeat kidney biopsy. \par \par Plan:\par 1.  Lab tests reviewed  \par 2.  Kidney biopsy being planned\par 3.  ECHO previously suggested\par 4.  Return 2 months\par 5.  Systemic Lupus Erythematosus, known as lupus, is a chronic autoimmune disease that can affect any organ in the body posing threats to proper organ function and even to life. Therefore, close surveillance of all bodily functions is required, including but not limited to central and peripheral nervous system, ocular and auditory systems, cardiopulmonary function, kidney function, mucocutaneous and musculoskeletal systems as well as constitutional manifestations. Surveillance consists of history, physical, and laboratory tests. Treatment varies, but most of the drugs used are high risk and therefore also require close monitoring in the form of blood and urine tests.\par 6.  High risk medications used in the treatment of rheumatic diseases include steroids, disease modifying agents, immunosuppressive therapies, antimalarials, biologics, and chemotherapy. Regardless of which drug or class of drug, the potential toxicities of these therapies mandate close monitoring in the form of a history, physical, and laboratory tests.\par 7.  Antiphospholipid Syndrome, known as APS, is a chronic autoimmune disease that leads to arterial and/or venous thrombotic events and therefore poses threats to proper organ function and even to life. Therefore, close surveillance of all bodily functions is required, including but not limited to central and peripheral nervous system, ocular and auditory systems, cardiopulmonary function, kidney function, mucocutaneous and musculoskeletal systems. Surveillance consists of history, physical, and laboratory tests. Treatment varies, but those at high risk are generally anticoagulated.  Therefore, patients with APS require close monitoring in the form of blood and urine tests.\par \par  \par

## 2023-07-13 ENCOUNTER — APPOINTMENT (OUTPATIENT)
Dept: INTERNAL MEDICINE | Facility: CLINIC | Age: 33
End: 2023-07-13
Payer: COMMERCIAL

## 2023-07-13 ENCOUNTER — APPOINTMENT (OUTPATIENT)
Dept: HEMATOLOGY ONCOLOGY | Facility: CLINIC | Age: 33
End: 2023-07-13

## 2023-07-13 ENCOUNTER — NON-APPOINTMENT (OUTPATIENT)
Age: 33
End: 2023-07-13

## 2023-07-13 DIAGNOSIS — R50.9 FEVER, UNSPECIFIED: ICD-10-CM

## 2023-07-13 PROCEDURE — 99443: CPT

## 2023-07-14 ENCOUNTER — NON-APPOINTMENT (OUTPATIENT)
Age: 33
End: 2023-07-14

## 2023-07-15 ENCOUNTER — INPATIENT (INPATIENT)
Facility: HOSPITAL | Age: 33
LOS: 2 days | Discharge: HOME | DRG: 872 | End: 2023-07-18
Attending: STUDENT IN AN ORGANIZED HEALTH CARE EDUCATION/TRAINING PROGRAM | Admitting: STUDENT IN AN ORGANIZED HEALTH CARE EDUCATION/TRAINING PROGRAM
Payer: COMMERCIAL

## 2023-07-15 VITALS
WEIGHT: 154.98 LBS | HEART RATE: 111 BPM | TEMPERATURE: 100 F | OXYGEN SATURATION: 97 % | RESPIRATION RATE: 20 BRPM | HEIGHT: 62 IN | DIASTOLIC BLOOD PRESSURE: 88 MMHG | SYSTOLIC BLOOD PRESSURE: 133 MMHG

## 2023-07-15 DIAGNOSIS — M32.9 SYSTEMIC LUPUS ERYTHEMATOSUS, UNSPECIFIED: ICD-10-CM

## 2023-07-15 DIAGNOSIS — A41.9 SEPSIS, UNSPECIFIED ORGANISM: ICD-10-CM

## 2023-07-15 DIAGNOSIS — M32.14 GLOMERULAR DISEASE IN SYSTEMIC LUPUS ERYTHEMATOSUS: ICD-10-CM

## 2023-07-15 DIAGNOSIS — R50.9 FEVER, UNSPECIFIED: ICD-10-CM

## 2023-07-15 DIAGNOSIS — Z29.9 ENCOUNTER FOR PROPHYLACTIC MEASURES, UNSPECIFIED: ICD-10-CM

## 2023-07-15 DIAGNOSIS — I10 ESSENTIAL (PRIMARY) HYPERTENSION: ICD-10-CM

## 2023-07-15 DIAGNOSIS — K08.409 PARTIAL LOSS OF TEETH, UNSPECIFIED CAUSE, UNSPECIFIED CLASS: Chronic | ICD-10-CM

## 2023-07-15 DIAGNOSIS — D61.818 OTHER PANCYTOPENIA: ICD-10-CM

## 2023-07-15 LAB
ALBUMIN SERPL ELPH-MCNC: 3.8 G/DL — SIGNIFICANT CHANGE UP (ref 3.3–5)
ALP SERPL-CCNC: 137 U/L — HIGH (ref 40–120)
ALT FLD-CCNC: 50 U/L — HIGH (ref 10–45)
ANION GAP SERPL CALC-SCNC: 11 MMOL/L — SIGNIFICANT CHANGE UP (ref 5–17)
APPEARANCE UR: CLEAR — SIGNIFICANT CHANGE UP
APTT BLD: 33.7 SEC — SIGNIFICANT CHANGE UP (ref 27.5–35.5)
AST SERPL-CCNC: 72 U/L — HIGH (ref 10–40)
BACTERIA # UR AUTO: NEGATIVE — SIGNIFICANT CHANGE UP
BASE EXCESS BLDV CALC-SCNC: -3.6 MMOL/L — LOW (ref -2–3)
BASOPHILS # BLD AUTO: 0.02 K/UL — SIGNIFICANT CHANGE UP (ref 0–0.2)
BASOPHILS NFR BLD AUTO: 0.9 % — SIGNIFICANT CHANGE UP (ref 0–2)
BILIRUB SERPL-MCNC: 0.3 MG/DL — SIGNIFICANT CHANGE UP (ref 0.2–1.2)
BILIRUB UR-MCNC: NEGATIVE — SIGNIFICANT CHANGE UP
BUN SERPL-MCNC: 21 MG/DL — SIGNIFICANT CHANGE UP (ref 7–23)
CA-I SERPL-SCNC: 1.2 MMOL/L — SIGNIFICANT CHANGE UP (ref 1.15–1.33)
CALCIUM SERPL-MCNC: 8.8 MG/DL — SIGNIFICANT CHANGE UP (ref 8.4–10.5)
CHLORIDE BLDV-SCNC: 104 MMOL/L — SIGNIFICANT CHANGE UP (ref 96–108)
CHLORIDE SERPL-SCNC: 103 MMOL/L — SIGNIFICANT CHANGE UP (ref 96–108)
CO2 BLDV-SCNC: 23 MMOL/L — SIGNIFICANT CHANGE UP (ref 22–26)
CO2 SERPL-SCNC: 20 MMOL/L — LOW (ref 22–31)
COLOR SPEC: SIGNIFICANT CHANGE UP
CREAT SERPL-MCNC: 1.97 MG/DL — HIGH (ref 0.5–1.3)
DIFF PNL FLD: ABNORMAL
EGFR: 34 ML/MIN/1.73M2 — LOW
EOSINOPHIL # BLD AUTO: 0 K/UL — SIGNIFICANT CHANGE UP (ref 0–0.5)
EOSINOPHIL NFR BLD AUTO: 0 % — SIGNIFICANT CHANGE UP (ref 0–6)
EPI CELLS # UR: 1 /HPF — SIGNIFICANT CHANGE UP
GAS PNL BLDV: 132 MMOL/L — LOW (ref 136–145)
GAS PNL BLDV: SIGNIFICANT CHANGE UP
GAS PNL BLDV: SIGNIFICANT CHANGE UP
GLUCOSE BLDV-MCNC: 94 MG/DL — SIGNIFICANT CHANGE UP (ref 70–99)
GLUCOSE SERPL-MCNC: 97 MG/DL — SIGNIFICANT CHANGE UP (ref 70–99)
GLUCOSE UR QL: NEGATIVE — SIGNIFICANT CHANGE UP
HCO3 BLDV-SCNC: 22 MMOL/L — SIGNIFICANT CHANGE UP (ref 22–29)
HCT VFR BLD CALC: 26.4 % — LOW (ref 34.5–45)
HCT VFR BLDA CALC: 28 % — LOW (ref 34.5–46.5)
HGB BLD CALC-MCNC: 9.3 G/DL — LOW (ref 11.7–16.1)
HGB BLD-MCNC: 8.8 G/DL — LOW (ref 11.5–15.5)
HYALINE CASTS # UR AUTO: 0 /LPF — SIGNIFICANT CHANGE UP (ref 0–2)
IMM GRANULOCYTES NFR BLD AUTO: 1.4 % — HIGH (ref 0–0.9)
INR BLD: 1.04 RATIO — SIGNIFICANT CHANGE UP (ref 0.88–1.16)
KETONES UR-MCNC: NEGATIVE — SIGNIFICANT CHANGE UP
LACTATE BLDV-MCNC: 1 MMOL/L — SIGNIFICANT CHANGE UP (ref 0.5–2)
LEUKOCYTE ESTERASE UR-ACNC: NEGATIVE — SIGNIFICANT CHANGE UP
LYMPHOCYTES # BLD AUTO: 0.68 K/UL — LOW (ref 1–3.3)
LYMPHOCYTES # BLD AUTO: 31.6 % — SIGNIFICANT CHANGE UP (ref 13–44)
MCHC RBC-ENTMCNC: 29.7 PG — SIGNIFICANT CHANGE UP (ref 27–34)
MCHC RBC-ENTMCNC: 33.3 GM/DL — SIGNIFICANT CHANGE UP (ref 32–36)
MCV RBC AUTO: 89.2 FL — SIGNIFICANT CHANGE UP (ref 80–100)
MONOCYTES # BLD AUTO: 0.09 K/UL — SIGNIFICANT CHANGE UP (ref 0–0.9)
MONOCYTES NFR BLD AUTO: 4.2 % — SIGNIFICANT CHANGE UP (ref 2–14)
NEUTROPHILS # BLD AUTO: 1.33 K/UL — LOW (ref 1.8–7.4)
NEUTROPHILS NFR BLD AUTO: 61.9 % — SIGNIFICANT CHANGE UP (ref 43–77)
NITRITE UR-MCNC: NEGATIVE — SIGNIFICANT CHANGE UP
NRBC # BLD: 0 /100 WBCS — SIGNIFICANT CHANGE UP (ref 0–0)
PCO2 BLDV: 42 MMHG — SIGNIFICANT CHANGE UP (ref 39–42)
PH BLDV: 7.33 — SIGNIFICANT CHANGE UP (ref 7.32–7.43)
PH UR: 6 — SIGNIFICANT CHANGE UP (ref 5–8)
PLATELET # BLD AUTO: 123 K/UL — LOW (ref 150–400)
PO2 BLDV: 33 MMHG — SIGNIFICANT CHANGE UP (ref 25–45)
POTASSIUM BLDV-SCNC: 5.1 MMOL/L — SIGNIFICANT CHANGE UP (ref 3.5–5.1)
POTASSIUM SERPL-MCNC: 5 MMOL/L — SIGNIFICANT CHANGE UP (ref 3.5–5.3)
POTASSIUM SERPL-SCNC: 5 MMOL/L — SIGNIFICANT CHANGE UP (ref 3.5–5.3)
PROT SERPL-MCNC: 7.4 G/DL — SIGNIFICANT CHANGE UP (ref 6–8.3)
PROT UR-MCNC: ABNORMAL
PROTHROM AB SERPL-ACNC: 12.1 SEC — SIGNIFICANT CHANGE UP (ref 10.5–13.4)
RAPID RVP RESULT: SIGNIFICANT CHANGE UP
RBC # BLD: 2.96 M/UL — LOW (ref 3.8–5.2)
RBC # FLD: 15.2 % — HIGH (ref 10.3–14.5)
RBC CASTS # UR COMP ASSIST: 3 /HPF — SIGNIFICANT CHANGE UP (ref 0–4)
SAO2 % BLDV: 47.4 % — LOW (ref 67–88)
SARS-COV-2 RNA SPEC QL NAA+PROBE: SIGNIFICANT CHANGE UP
SODIUM SERPL-SCNC: 134 MMOL/L — LOW (ref 135–145)
SP GR SPEC: 1.01 — LOW (ref 1.01–1.02)
UROBILINOGEN FLD QL: NEGATIVE — SIGNIFICANT CHANGE UP
WBC # BLD: 2.15 K/UL — LOW (ref 3.8–10.5)
WBC # FLD AUTO: 2.15 K/UL — LOW (ref 3.8–10.5)
WBC UR QL: 1 /HPF — SIGNIFICANT CHANGE UP (ref 0–5)

## 2023-07-15 PROCEDURE — 99223 1ST HOSP IP/OBS HIGH 75: CPT

## 2023-07-15 PROCEDURE — 71045 X-RAY EXAM CHEST 1 VIEW: CPT | Mod: 26

## 2023-07-15 PROCEDURE — 99285 EMERGENCY DEPT VISIT HI MDM: CPT

## 2023-07-15 RX ORDER — FAMOTIDINE 10 MG/ML
1 INJECTION INTRAVENOUS
Refills: 0 | DISCHARGE

## 2023-07-15 RX ORDER — CEFEPIME 1 G/1
2000 INJECTION, POWDER, FOR SOLUTION INTRAMUSCULAR; INTRAVENOUS ONCE
Refills: 0 | Status: COMPLETED | OUTPATIENT
Start: 2023-07-15 | End: 2023-07-15

## 2023-07-15 RX ORDER — CEFEPIME 1 G/1
2000 INJECTION, POWDER, FOR SOLUTION INTRAMUSCULAR; INTRAVENOUS EVERY 12 HOURS
Refills: 0 | Status: DISCONTINUED | OUTPATIENT
Start: 2023-07-16 | End: 2023-07-16

## 2023-07-15 RX ORDER — SODIUM CHLORIDE 9 MG/ML
1000 INJECTION INTRAMUSCULAR; INTRAVENOUS; SUBCUTANEOUS ONCE
Refills: 0 | Status: COMPLETED | OUTPATIENT
Start: 2023-07-15 | End: 2023-07-15

## 2023-07-15 RX ORDER — HYDROXYCHLOROQUINE SULFATE 200 MG
200 TABLET ORAL DAILY
Refills: 0 | Status: DISCONTINUED | OUTPATIENT
Start: 2023-07-15 | End: 2023-07-18

## 2023-07-15 RX ORDER — AMLODIPINE BESYLATE 2.5 MG/1
10 TABLET ORAL DAILY
Refills: 0 | Status: DISCONTINUED | OUTPATIENT
Start: 2023-07-15 | End: 2023-07-18

## 2023-07-15 RX ORDER — ACETAMINOPHEN 500 MG
650 TABLET ORAL EVERY 6 HOURS
Refills: 0 | Status: DISCONTINUED | OUTPATIENT
Start: 2023-07-15 | End: 2023-07-18

## 2023-07-15 RX ORDER — FAMOTIDINE 10 MG/ML
20 INJECTION INTRAVENOUS DAILY
Refills: 0 | Status: DISCONTINUED | OUTPATIENT
Start: 2023-07-15 | End: 2023-07-18

## 2023-07-15 RX ORDER — ACETAMINOPHEN 500 MG
1000 TABLET ORAL ONCE
Refills: 0 | Status: COMPLETED | OUTPATIENT
Start: 2023-07-15 | End: 2023-07-15

## 2023-07-15 RX ADMIN — Medication 650 MILLIGRAM(S): at 22:01

## 2023-07-15 RX ADMIN — SODIUM CHLORIDE 1000 MILLILITER(S): 9 INJECTION INTRAMUSCULAR; INTRAVENOUS; SUBCUTANEOUS at 13:42

## 2023-07-15 RX ADMIN — Medication 400 MILLIGRAM(S): at 13:42

## 2023-07-15 RX ADMIN — CEFEPIME 100 MILLIGRAM(S): 1 INJECTION, POWDER, FOR SOLUTION INTRAMUSCULAR; INTRAVENOUS at 17:11

## 2023-07-15 NOTE — H&P ADULT - PROBLEM SELECTOR PLAN 4
- Renal outpt made aware of admission  - Cr appears at baseline since recent pregnancy (6 months ago)  - Consider renal eval in AM

## 2023-07-15 NOTE — H&P ADULT - ASSESSMENT
33yo F w/ PMH of HTN, SLE, lupus nephritis Class III, CKDIII pw F/C, fatigue, sore throat and non-productive cough, meeting sepsis criteria w/ fever, HR and WBC count c/b pancytopenia and elevated liver enzymes c/f viral infection vs medication adverse effect vs SLE flare

## 2023-07-15 NOTE — ED PROVIDER NOTE - CLINICAL SUMMARY MEDICAL DECISION MAKING FREE TEXT BOX
32-year-old female past medical history of SLE, lupus nephritis, CKD presenting with fever. 32-year-old female past medical history of SLE, lupus nephritis, CKD presenting with fever. Patient had fever was associated sore throat, fatigue, cough, poor appetite, SOB for past week.  Recently found to have low white count, low hemoglobin, low sodium, elevated creatinine instructed to present to ED.  Recently at a large gathering.  Taking hydroxychloroquine and azathioprine.  On exam patient is febrile to 100.3 F tachycardic to 111, /88.  Exam shows clear oropharynx, clear lungs, normal heart sounds, soft nontender abdomen, no lower extremity swelling or tenderness.  Perform sepsis work-up to evaluate for neutropenic fever versus bacteremia versus other source of infections

## 2023-07-15 NOTE — H&P ADULT - PROBLEM SELECTOR PLAN 3
- Hold Azathioprine for now given pancytopenia and fevers  - c/w hydroxychloroquine   - f/u C3, C4 and dsDNA  - Consider rheum eval (follows w/ Dr. Mejia outpt who advised her to present to ED)

## 2023-07-15 NOTE — ED PROVIDER NOTE - PHYSICAL EXAMINATION
GENERAL: well appearing in no acute distress, non-toxic appearing  HEAD: normocephalic, atraumatic  HEENT: normal conjunctiva, oral mucosa moist, neck supple, no JVD  CARDIAC: regular rate and rhythm, normal S1S2, no appreciable murmurs, 2+ pulses in UE/LE b/l  PULM: normal breath sounds, clear to ascultation bilaterally, no rales, rhonchi, wheezing  GI: abdomen nondistended, soft, nontender, no guarding, rebound tenderness  NEURO: no focal motor or sensory deficits, normal speech, normal gait, AAOx3  MSK: no peripheral edema, no calf tenderness b/l  SKIN: well-perfused, extremities warm, no visible rashes  PSYCH: appropriate mood and affect

## 2023-07-15 NOTE — H&P ADULT - PROBLEM SELECTOR PLAN 2
- Unclear etiology at this time though URI possibility  - s/p Cefepime in the ED, will continue for now  - f/u blood and ur cx

## 2023-07-15 NOTE — H&P ADULT - NSICDXPASTMEDICALHX_GEN_ALL_CORE_FT
PAST MEDICAL HISTORY:  HTN (hypertension)     Lupus nephritis     SLE (systemic lupus erythematosus)     Stage 3 chronic kidney disease

## 2023-07-15 NOTE — ED PROVIDER NOTE - ATTENDING WITH...
Depth In Mm: 2 Location #2: neck Depth In Mm: 1.3 Depth In Mm: 1.4 Depth In Mm: 1.5 Pre-Procedure Text: After consent was obtained the treatment areas were treated using the above parameters. Price (Use Numbers Only, No Special Characters Or $): 574 Consent: Written consent obtained, risks reviewed including but not limited to pain, scarring, infection and incomplete improvement.  Patient understands the procedure is cosmetic in nature and will require out of pocket payment. Rf (Optional): 5/500,4/600,4/600 Treatment Number (Optional): 0 Location #1: perioral Detail Level: Zone Post-Care Instructions: After the procedure, take precautions agains sun exposure. Do not apply sunscreen for 12 hours after the procedure. Do not apply make-up for 12 hours after the procedure. Avoid alcohol based toners for 10-14 days. After 2-3 days patients can return to their regular skin regimen. Resident

## 2023-07-15 NOTE — ED ADULT NURSE NOTE - OBJECTIVE STATEMENT
Patient is a 32 year old female complaining of fever, nausea, lethargy for 1 week. PMH SLE, lupus nephritis, CK. Patient reports fever associated with fatigue, lightheadedness, poor appetite, sore throat, cough - patient was at a wedding last weekend. On assessment A&Ox4, breathing comfortably on room air, no accessory muscle use, abdomen soft, NSR on the cardiac monitor, no edema, no jvd, skin warm dry. Patient denies chest pain, palpitations, cough, SOB, abdominal pain, n/v/d, urinary symptoms at this time.

## 2023-07-15 NOTE — H&P ADULT - PROBLEM SELECTOR PLAN 1
- No hx of pancytopenia   - Recent sick contacts w/ fevers, elevated liver enzymes and hx of SLE on immunosuppression makes infectious etiology more likely vs Azathioprine also on ddx as cause of pancytopenia vs SLE flare vs less likely malignant  - CXR unremarkable, UA negative and RVP negative  - Will obtain strep swab (tender cervical LN w/ fever), HIV, hepatitis panel, parvovirus and EBV  - Will also obtain retic count, ferritin and LDH  - Obtain C3, C4, dsDNA and B12, folate  - c/w Cefepime for now given SIRS+ and immunocompromised  - Hold off on CT A/P given renal impairment  - Consider heme eval (follows w/ Dr. Gomez outpt)

## 2023-07-15 NOTE — ED PROVIDER NOTE - OBJECTIVE STATEMENT
This is a 32-year-old female past medical history of SLE, lupus nephritis, CKD presenting with fever.  Patient has had fever for past week with associated fatigue, lightheadedness, poor appetite, sore throat, cough.  Patient was recently at a large wedding.  Was seen in outpatient clinic and found to have WBC 2.3 hemoglobin 8.5 sodium 130 creatinine 2 and was instructed to present to ED.  Patient is currently taking hydroxychloroquine and azathioprine.  She otherwise denies headache, CP, SOB, abdominal, nausea, vomiting, diarrhea, hematuria, LE pain/swelling, rash.

## 2023-07-15 NOTE — H&P ADULT - NSHPPHYSICALEXAM_GEN_ALL_CORE
Vital Signs Last 24 Hrs  T(C): 36.9 (15 Jul 2023 20:39), Max: 38 (15 Jul 2023 13:45)  T(F): 98.5 (15 Jul 2023 20:39), Max: 100.4 (15 Jul 2023 13:45)  HR: 84 (15 Jul 2023 20:39) (83 - 111)  BP: 123/89 (15 Jul 2023 20:39) (108/64 - 133/88)  RR: 18 (15 Jul 2023 20:39) (17 - 20)  SpO2: 100% (15 Jul 2023 20:39) (97% - 100%)    Parameters below as of 15 Jul 2023 20:39  Patient On (Oxygen Delivery Method): room air    CONSTITUTIONAL: Well-groomed, in no apparent distress  EYES: No conjunctival or scleral injection, non-icteric;   ENMT: No external nasal lesions; MMM  NECK: Trachea midline without palpable neck mass; thyroid not enlarged and non-tender  RESPIRATORY: Breathing comfortably; no dullness to percussion; lungs CTA without wheeze/rhonchi/rales  CARDIOVASCULAR: +S1S2, RRR, no M/G/R; pedal pulses full and symmetric; no lower extremity edema  GASTROINTESTINAL: No palpable masses or tenderness, +BS throughout, no rebound/guarding; no hepatosplenomegaly; no hernia palpated  LYMPHATIC: Cervical LAD w/ tenderness to palpation  SKIN: No rashes or ulcers noted  NEUROLOGIC: CN II-XII intact; sensation intact in LEs b/l to light touch  PSYCHIATRIC: A&Ox3; mood and affect appropriate; appropriate insight and judgment

## 2023-07-15 NOTE — H&P ADULT - PROBLEM SELECTOR PROBLEM 1
I called [facility], they will fill zolpidem (AMBIEN) 10 MG tablet and ready for  in 1 hour, informed patient the pharmacy closes at 8 pm.    Pancytopenia with fever

## 2023-07-15 NOTE — H&P ADULT - NSHPREVIEWOFSYSTEMS_GEN_ALL_CORE
CONSTITUTIONAL: Fever. No weight loss  EYES: No eye pain, visual disturbances, or discharge  ENMT:  No difficulty hearing, tinnitus, vertigo; No sinus or throat pain  RESPIRATORY: Non-productive cough, chills. No SOB. No wheezing, or hemoptysis  CARDIOVASCULAR: No chest pain, palpitations, dizziness, or leg swelling  GASTROINTESTINAL: No abdominal or epigastric pain. No nausea, vomiting, or hematemesis; No diarrhea or constipation. No melena or hematochezia.  GENITOURINARY: No dysuria, frequency, hematuria, or incontinence  NEUROLOGICAL: No headaches, memory loss, loss of strength, numbness, or tremors  SKIN: No itching, burning, rashes, or lesions   LYMPH NODES: cervical LAD  ENDOCRINE: No heat or cold intolerance; No hair loss  MUSCULOSKELETAL: No joint pain or swelling; No muscle, back pain  PSYCHIATRIC: No depression, anxiety, mood swings, or difficulty sleeping  HEME/LYMPH: No easy bruising, or bleeding gums

## 2023-07-15 NOTE — H&P ADULT - NSHPLABSRESULTS_GEN_ALL_CORE
LABS:                      8.8    2.15  )-----------( 123      ( 15 Jul 2023 13:56 )             26.4     134<L>  |  103  |  21  ----------------------------<  97  5.0   |  20<L>  |  1.97<H>    Ca    8.8      15 Jul 2023 13:56    TPro  7.4  /  Alb  3.8  /  TBili  0.3  /  DBili  x   /  AST  72<H>  /  ALT  50<H>  /  AlkPhos  137<H>  07-15    LIVER FUNCTIONS - ( 15 Jul 2023 13:56 )  Alb: 3.8 g/dL / Pro: 7.4 g/dL / ALK PHOS: 137 U/L / ALT: 50 U/L / AST: 72 U/L / GGT: x           PT/INR - ( 15 Jul 2023 13:56 )   PT: 12.1 sec;   INR: 1.04 ratio    PTT - ( 15 Jul 2023 13:56 )  PTT:33.7 sec    Urinalysis Basic - ( 15 Jul 2023 13:56 )  Color: x / Appearance: x / SG: x / pH: x  Gluc: 97 mg/dL / Ketone: x  / Bili: x / Urobili: x   Blood: x / Protein: x / Nitrite: x   Leuk Esterase: x / RBC: x / WBC x   Sq Epi: x / Non Sq Epi: x / Bacteria: x    IMAGING:  Xray Chest 1 View AP/PA (07.15.23 @ 15:11)  IMPRESSION:  - Clear lungs.    [X] Imaging personally reviewed by me-   [X] ECG personally reviewed by me- NSR w/out acute ischemic changes. QTc 425

## 2023-07-15 NOTE — ED PROVIDER NOTE - PROGRESS NOTE DETAILS
Armen Vasques MD  WBC 2.15, UA neg, RVP neg, CXR clear. Will seek admit for neutropenic fever of unknown origin. Will give cefepime

## 2023-07-15 NOTE — ED PROVIDER NOTE - NS ED ROS FT
General: +fever, +chills, +fatigue  HENT: +sore throat, denies nasal congestion, rhinorrhea  Eyes: denies visual changes, blurred vision  CV: denies chest pain, palpitations  Resp: +cough, denies difficulty breathing  Abdominal: +poor appetite, denies nausea, vomiting, diarrhea, abdominal pain  : denies hematuria, urinary pain or discharge  MSK: denies muscle aches, leg swelling  Neuro: denies headaches, numbness, tingling  Skin: denies rashes, bruises

## 2023-07-15 NOTE — H&P ADULT - NSICDXFAMILYHX_GEN_ALL_CORE_FT
FAMILY HISTORY:  Father  Still living? Unknown  FH: HTN (hypertension), Age at diagnosis: Age Unknown  FH: non-Hodgkin's lymphoma, Age at diagnosis: Age Unknown    Mother  Still living? Unknown  FH: type 2 diabetes mellitus, Age at diagnosis: Age Unknown

## 2023-07-15 NOTE — ED ADULT NURSE NOTE - NSFALLUNIVINTERV_ED_ALL_ED
Bed/Stretcher in lowest position, wheels locked, appropriate side rails in place/Call bell, personal items and telephone in reach/Instruct patient to call for assistance before getting out of bed/chair/stretcher/Non-slip footwear applied when patient is off stretcher/[place] to call system/Physically safe environment - no spills, clutter or unnecessary equipment/Purposeful proactive rounding/Room/bathroom lighting operational, light cord in reach

## 2023-07-15 NOTE — ED PROVIDER NOTE - ATTENDING CONTRIBUTION TO CARE
Cesar Dobbins MD, FACEP: In this physician's medical judgement based on clinical history and physical exam the patient's signs and symptoms lead to differential diagnoses which includes but is not limited to: viral infection, consumptive process consistent with bicytopenia/pancytopenia, bacteremia, SIRS    Historical features, symptoms, and clinical exam not consistent with: acs, cva    Labs were ordered and independently reviewed by me.  EKG was ordered and independently reviewed by me.  Imaging was ordered and reviewed by me.      Appropriate medications for the patient's presenting complaints were ordered, and effects were reassessed.     Patient's records including prior hospital visit, med and medical history were reviewed.   History assisted by sister  Escalation to admission/observation was considered.    Will follow up on labs, therapeutics, imaging, reassess and disposition as clinically indicated.  *The above represents an initial assessment/impression. Please refer to my progress notes below for potential changes in patient clinical course*

## 2023-07-15 NOTE — H&P ADULT - HISTORY OF PRESENT ILLNESS
Pt is a 33yo F w/ PMH of HTN, SLE, lupus nephritis Class III, CKDIII pw pancytopenia,    She reports having recently been at a large wedding w/ over 500 people, some of whom reported "sick" symptoms including her , uncle, and child. Initially they had similar symptoms as her including fevers, chills, fatigue, non-productive cough, and sore throat, their symptoms resolved, hers, however, have persisted and gotten worse over the past week.    She presented to her outpatient provider on Thursday and underwent blood work which revealed pancytopenia and she was advised to present to the ED for further w/u. She otherwise denies any CP, SOB, palpitations, abd pain, N/V, constipation, diarrhea or urinary symptoms.     Of note she is on Azathioprine for SLE which she has been holding for the past 2 days.    In the ED febrile to 100.4 w/ tachycardia to 111 and pancytopenia. CXR w/out consolidation, RVP negative and UA w/out UTI. She was administered Cefepime, Tylenol and 1L LR.

## 2023-07-16 ENCOUNTER — NON-APPOINTMENT (OUTPATIENT)
Age: 33
End: 2023-07-16

## 2023-07-16 LAB
ALBUMIN SERPL ELPH-MCNC: 3.4 G/DL — SIGNIFICANT CHANGE UP (ref 3.3–5)
ALBUMIN SERPL ELPH-MCNC: 3.7 G/DL
ALP BLD-CCNC: 97 U/L
ALP SERPL-CCNC: 140 U/L — HIGH (ref 40–120)
ALT FLD-CCNC: 53 U/L — HIGH (ref 10–45)
ALT SERPL-CCNC: 35 U/L
ANION GAP SERPL CALC-SCNC: 11 MMOL/L — SIGNIFICANT CHANGE UP (ref 5–17)
ANION GAP SERPL CALC-SCNC: 13 MMOL/L
ANISOCYTOSIS BLD QL: SIGNIFICANT CHANGE UP
APTT BLD: 26.7 SEC — LOW (ref 27.5–35.5)
AST SERPL-CCNC: 51 U/L
AST SERPL-CCNC: 70 U/L — HIGH (ref 10–40)
BASOPHILS # BLD AUTO: 0 K/UL — SIGNIFICANT CHANGE UP (ref 0–0.2)
BASOPHILS NFR BLD AUTO: 0 % — SIGNIFICANT CHANGE UP (ref 0–2)
BILIRUB SERPL-MCNC: 0.4 MG/DL — SIGNIFICANT CHANGE UP (ref 0.2–1.2)
BILIRUB SERPL-MCNC: 0.5 MG/DL
BUN SERPL-MCNC: 18 MG/DL
BUN SERPL-MCNC: 20 MG/DL — SIGNIFICANT CHANGE UP (ref 7–23)
CALCIUM SERPL-MCNC: 8.2 MG/DL
CALCIUM SERPL-MCNC: 8.8 MG/DL — SIGNIFICANT CHANGE UP (ref 8.4–10.5)
CHLORIDE SERPL-SCNC: 108 MMOL/L — SIGNIFICANT CHANGE UP (ref 96–108)
CHLORIDE SERPL-SCNC: 97 MMOL/L
CHOLEST SERPL-MCNC: 130 MG/DL — SIGNIFICANT CHANGE UP
CO2 SERPL-SCNC: 19 MMOL/L — LOW (ref 22–31)
CO2 SERPL-SCNC: 20 MMOL/L
CREAT SERPL-MCNC: 1.83 MG/DL — HIGH (ref 0.5–1.3)
CREAT SERPL-MCNC: 2 MG/DL
CULTURE RESULTS: SIGNIFICANT CHANGE UP
EGFR: 34 ML/MIN/1.73M2
EGFR: 38 ML/MIN/1.73M2 — LOW
ELLIPTOCYTES BLD QL SMEAR: SLIGHT — SIGNIFICANT CHANGE UP
EOSINOPHIL # BLD AUTO: 0.02 K/UL — SIGNIFICANT CHANGE UP (ref 0–0.5)
EOSINOPHIL NFR BLD AUTO: 0.9 % — SIGNIFICANT CHANGE UP (ref 0–6)
FERRITIN SERPL-MCNC: 261 NG/ML — HIGH (ref 15–150)
FOLATE SERPL-MCNC: >20 NG/ML — SIGNIFICANT CHANGE UP
GLUCOSE SERPL-MCNC: 100 MG/DL — HIGH (ref 70–99)
GLUCOSE SERPL-MCNC: 90 MG/DL
HAPTOGLOB SERPL-MCNC: <20 MG/DL — LOW (ref 34–200)
HAV IGM SER-ACNC: SIGNIFICANT CHANGE UP
HBV CORE IGM SER-ACNC: SIGNIFICANT CHANGE UP
HBV SURFACE AG SER-ACNC: SIGNIFICANT CHANGE UP
HCT VFR BLD CALC: 24.6 %
HCT VFR BLD CALC: 26.3 % — LOW (ref 34.5–45)
HCV AB S/CO SERPL IA: 0.08 S/CO — SIGNIFICANT CHANGE UP (ref 0–0.99)
HCV AB SERPL-IMP: SIGNIFICANT CHANGE UP
HDLC SERPL-MCNC: 23 MG/DL — LOW
HGB BLD-MCNC: 8.5 G/DL
HGB BLD-MCNC: 8.7 G/DL — LOW (ref 11.5–15.5)
HIV 1+2 AB+HIV1 P24 AG SERPL QL IA: SIGNIFICANT CHANGE UP
INR BLD: 0.98 RATIO — SIGNIFICANT CHANGE UP (ref 0.88–1.16)
LDH SERPL L TO P-CCNC: 622 U/L — HIGH (ref 50–242)
LIPID PNL WITH DIRECT LDL SERPL: 67 MG/DL — SIGNIFICANT CHANGE UP
LYMPHOCYTES # BLD AUTO: 0.73 K/UL — LOW (ref 1–3.3)
LYMPHOCYTES # BLD AUTO: 35.9 % — SIGNIFICANT CHANGE UP (ref 13–44)
MAGNESIUM SERPL-MCNC: 2 MG/DL — SIGNIFICANT CHANGE UP (ref 1.6–2.6)
MANUAL SMEAR VERIFICATION: SIGNIFICANT CHANGE UP
MCHC RBC-ENTMCNC: 30 PG — SIGNIFICANT CHANGE UP (ref 27–34)
MCHC RBC-ENTMCNC: 31.5 PG
MCHC RBC-ENTMCNC: 33.1 GM/DL — SIGNIFICANT CHANGE UP (ref 32–36)
MCHC RBC-ENTMCNC: 34.6 GM/DL
MCV RBC AUTO: 90.7 FL — SIGNIFICANT CHANGE UP (ref 80–100)
MCV RBC AUTO: 91.1 FL
METAMYELOCYTES # FLD: 0.9 % — HIGH (ref 0–0)
MICROCYTES BLD QL: SIGNIFICANT CHANGE UP
MONOCYTES # BLD AUTO: 0.09 K/UL — SIGNIFICANT CHANGE UP (ref 0–0.9)
MONOCYTES NFR BLD AUTO: 4.4 % — SIGNIFICANT CHANGE UP (ref 2–14)
NEUTROPHILS # BLD AUTO: 1.13 K/UL — LOW (ref 1.8–7.4)
NEUTROPHILS NFR BLD AUTO: 54.4 % — SIGNIFICANT CHANGE UP (ref 43–77)
NEUTS BAND # BLD: 0.9 % — SIGNIFICANT CHANGE UP (ref 0–8)
NON HDL CHOLESTEROL: 107 MG/DL — SIGNIFICANT CHANGE UP
PHOSPHATE SERPL-MCNC: 3.5 MG/DL — SIGNIFICANT CHANGE UP (ref 2.5–4.5)
PLAT MORPH BLD: NORMAL — SIGNIFICANT CHANGE UP
PLATELET # BLD AUTO: 117 K/UL — LOW (ref 150–400)
PLATELET # BLD AUTO: 140 K/UL
POIKILOCYTOSIS BLD QL AUTO: SLIGHT — SIGNIFICANT CHANGE UP
POTASSIUM SERPL-MCNC: 5.3 MMOL/L — SIGNIFICANT CHANGE UP (ref 3.5–5.3)
POTASSIUM SERPL-SCNC: 5 MMOL/L
POTASSIUM SERPL-SCNC: 5.3 MMOL/L — SIGNIFICANT CHANGE UP (ref 3.5–5.3)
PROT SERPL-MCNC: 6.4 G/DL
PROT SERPL-MCNC: 6.8 G/DL — SIGNIFICANT CHANGE UP (ref 6–8.3)
PROTHROM AB SERPL-ACNC: 11.4 SEC — SIGNIFICANT CHANGE UP (ref 10.5–13.4)
RBC # BLD: 2.7 M/UL
RBC # BLD: 2.9 M/UL — LOW (ref 3.8–5.2)
RBC # BLD: 2.9 M/UL — LOW (ref 3.8–5.2)
RBC # FLD: 15.4 %
RBC # FLD: 15.6 % — HIGH (ref 10.3–14.5)
RBC BLD AUTO: ABNORMAL
RETICS #: 108.5 K/UL — SIGNIFICANT CHANGE UP (ref 25–125)
RETICS/RBC NFR: 3.7 % — HIGH (ref 0.5–2.5)
SODIUM SERPL-SCNC: 130 MMOL/L
SODIUM SERPL-SCNC: 138 MMOL/L — SIGNIFICANT CHANGE UP (ref 135–145)
SPECIMEN SOURCE: SIGNIFICANT CHANGE UP
TRIGL SERPL-MCNC: 238 MG/DL — HIGH
TSH SERPL-ACNC: 1.48 UIU/ML
VARIANT LYMPHS # BLD: 2.6 % — SIGNIFICANT CHANGE UP (ref 0–6)
VIT B12 SERPL-MCNC: 1707 PG/ML — HIGH (ref 232–1245)
WBC # BLD: 2.04 K/UL — LOW (ref 3.8–10.5)
WBC # FLD AUTO: 2.04 K/UL — LOW (ref 3.8–10.5)
WBC # FLD AUTO: 2.3 K/UL

## 2023-07-16 PROCEDURE — 99233 SBSQ HOSP IP/OBS HIGH 50: CPT

## 2023-07-16 RX ORDER — CHLORHEXIDINE GLUCONATE 213 G/1000ML
1 SOLUTION TOPICAL DAILY
Refills: 0 | Status: DISCONTINUED | OUTPATIENT
Start: 2023-07-16 | End: 2023-07-18

## 2023-07-16 RX ORDER — CEFEPIME 1 G/1
1000 INJECTION, POWDER, FOR SOLUTION INTRAMUSCULAR; INTRAVENOUS EVERY 8 HOURS
Refills: 0 | Status: DISCONTINUED | OUTPATIENT
Start: 2023-07-16 | End: 2023-07-17

## 2023-07-16 RX ORDER — CEFEPIME 1 G/1
1000 INJECTION, POWDER, FOR SOLUTION INTRAMUSCULAR; INTRAVENOUS EVERY 12 HOURS
Refills: 0 | Status: DISCONTINUED | OUTPATIENT
Start: 2023-07-16 | End: 2023-07-16

## 2023-07-16 RX ADMIN — CHLORHEXIDINE GLUCONATE 1 APPLICATION(S): 213 SOLUTION TOPICAL at 14:53

## 2023-07-16 RX ADMIN — CEFEPIME 100 MILLIGRAM(S): 1 INJECTION, POWDER, FOR SOLUTION INTRAMUSCULAR; INTRAVENOUS at 05:01

## 2023-07-16 RX ADMIN — FAMOTIDINE 20 MILLIGRAM(S): 10 INJECTION INTRAVENOUS at 12:29

## 2023-07-16 RX ADMIN — CEFEPIME 100 MILLIGRAM(S): 1 INJECTION, POWDER, FOR SOLUTION INTRAMUSCULAR; INTRAVENOUS at 21:21

## 2023-07-16 RX ADMIN — Medication 200 MILLIGRAM(S): at 15:52

## 2023-07-16 RX ADMIN — Medication 650 MILLIGRAM(S): at 06:02

## 2023-07-16 RX ADMIN — AMLODIPINE BESYLATE 10 MILLIGRAM(S): 2.5 TABLET ORAL at 00:52

## 2023-07-16 RX ADMIN — Medication 650 MILLIGRAM(S): at 21:21

## 2023-07-16 RX ADMIN — Medication 650 MILLIGRAM(S): at 22:21

## 2023-07-16 NOTE — CONSULT NOTE ADULT - SUBJECTIVE AND OBJECTIVE BOX
[facility] DIV[facility] of INFECTIOUS DISEASE  Stuart Shabazz MD PhD, Marci Gomez MD, Teresa Cervantes MD, Sita Cleveland MD, Aaron Claire MD  and providing coverage with Alfie Bergman MD  Providing Infectious Disease Consultations at [facility], [facility], [facility], [facility]'s    Office# [phone] to schedule follow up appointments  Answering Service for urgent calls or New Consults [phone]  Cell# to text for urgent issues Stuart Shabazz [phone]     HPI:  Pt is a 33yo F w/ PMH of HTN, SLE, lupus nephritis Class III, CKDIII pw pancytopenia. Daron reports having recently been at a large wedding w/ over 500 people, some of whom reported "sick" symptoms including her , uncle, and child. Initially they had similar symptoms as her including fevers, chills, fatigue, non-productive cough, and sore throat, their symptoms resolved, hers, however, have persisted and gotten worse over the past week PTA.    She presented to her outpatient provider on Thursday and underwent blood work which revealed pancytopenia and she was advised to present to the ED for further w/u. She otherwise denies any CP, SOB, palpitations, abd pain, N/V, constipation, diarrhea or urinary symptoms.     Of note she is on Azathioprine for SLE which she has been holding for the past 2 days.    In the ED febrile to 100.4 w/ tachycardia to 111 and pancytopenia. CXR w/out consolidation, RVP negative and UA w/out UTI. She was administered Cefepime, Tylenol and 1L LR.      PAST MEDICAL & SURGICAL HISTORY:  Lupus nephritis      Stage 3 chronic kidney disease      HTN (hypertension)      SLE (systemic lupus erythematosus)      [place] teeth removed          Antimicrobials  cefepime   IVPB 1000 milliGRAM(s) IV Intermittent every 12 hours  hydroxychloroquine 200 milliGRAM(s) Oral daily      Immunological      Other  acetaminophen     Tablet .. 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)      FAMILY HISTORY:  FH: type 2 diabetes mellitus (Mother)    FH: HTN (hypertension) (Father)    FH: non-Hodgkin's lymphoma (Father)        ROS:    EYES:  Negative  blurry vision or double vision  GASTROINTESTINAL:  Negative for nausea, vomiting, diarrhea  -otherwise negative except for subjective    Vital Signs Last 24 Hrs  T(C): 36.9 (16 Jul 2023 16:05), Max: 37.4 (16 Jul 2023 05:13)  T(F): 98.4 (16 Jul 2023 16:05), Max: 99.3 (16 Jul 2023 05:13)  HR: 95 (16 Jul 2023 16:05) (84 - 99)  BP: 121/88 (16 Jul 2023 16:05) (108/64 - 125/87)  BP(mean): --  RR: 18 (16 Jul 2023 16:05) (17 - 18)  SpO2: 100% (16 Jul 2023 16:05) (97% - 100%)    Parameters below as of 16 Jul 2023 16:05  Patient On (Oxygen Delivery Method): room air        PE:  In no distress  HEENT:  NC, PERRL, sclerae anicteric, conjunctivae clear, EOMI.  Sinuses nontender, no nasal exudate.  No buccal or pharyngeal lesions, erythema or exudate  Neck:  Supple, no adenopathy  Lungs:  No accessory muscle use, bilaterally clear to auscultation  Cor:  distant  Abd:  Symmetric, normoactive BS.  Soft, nontender, no masses, guarding or rebound.  Liver and spleen not enlarged  Extrem:  No cyanosis or edema  Skin:  No rashes.  Neuro: grossly intact  Musc: moving all limbs freely, no focal deficits        LABS:                        8.7    2.04  )-----------( 117      ( 16 Jul 2023 05:23 )             26.3       WBC Count: 2.04 K/uL (07-16-23 @ 05:23)  WBC Count: 2.15 K/uL (07-15-23 @ 13:56)      07-16    138  |  108  |  20  ----------------------------<  100<H>  5.3   |  19<L>  |  1.83<H>    Ca    8.8      16 Jul 2023 05:23  Phos  3.5     07-16  Mg     2.0     07-16    TPro  6.8  /  Alb  3.4  /  TBili  0.4  /  DBili  x   /  AST  70<H>  /  ALT  53<H>  /  AlkPhos  140<H>  07-16      Creatinine: 1.83 mg/dL (07-16-23 @ 05:23)  Creatinine: 1.97 mg/dL (07-15-23 @ 13:56)      Urinalysis Basic - ( 16 Jul 2023 05:23 )    Color: x / Appearance: x / SG: x / pH: x  Gluc: 100 mg/dL / Ketone: x  / Bili: x / Urobili: x   Blood: x / Protein: x / Nitrite: x   Leuk Esterase: x / RBC: x / WBC x   Sq Epi: x / Non Sq Epi: x / Bacteria: x              MICROBIOLOGY:      RADIOLOGY & ADDITIONAL STUDIES:    -- [facility] INFECTIOUS DISEASE  Stuart Shabazz MD PhD, Marci Gomez MD, Teresa Cervantes MD, Sita Cleveland MD, Aaron Claire MD  and providing coverage with Alfie Bergman MD  Providing Infectious Disease Consultations at [facility], [facility], [facility], [facility]'s    Office# [phone] to schedule follow up appointments  Answering Service for urgent calls or New Consults [phone]  Cell# to text for urgent issues Stuart Shabazz [phone]     HPI:  Pt is a 31yo F w/ PMH of HTN, SLE, lupus nephritis Class III, CKDIII pw pancytopenia. Daron reports having recently been at a large wedding w/ over 500 people, some of whom reported "sick" symptoms including her , uncle, and child. Initially they had similar symptoms as her including fevers, chills, fatigue, non-productive cough, and sore throat, their symptoms resolved, hers, however, have persisted and gotten worse over the past week PTA.    She presented to her outpatient provider on Thursday and underwent blood work which revealed pancytopenia and she was advised to present to the ED for further w/u. She otherwise denies any CP, SOB, palpitations, abd pain, N/V, constipation, diarrhea or urinary symptoms.     Of note she is on Azathioprine for SLE which she has been holding for the past 2 days.    In the ED febrile to 100.4 w/ tachycardia to 111 and pancytopenia. CXR w/out consolidation, RVP negative and UA w/out UTI. She was administered Cefepime, Tylenol and 1L LR.      PAST MEDICAL & SURGICAL HISTORY:  Lupus nephritis      Stage 3 chronic kidney disease      HTN (hypertension)      SLE (systemic lupus erythematosus)      [place] teeth removed          Antimicrobials  cefepime   IVPB 1000 milliGRAM(s) IV Intermittent every 12 hours  hydroxychloroquine 200 milliGRAM(s) Oral daily      Immunological      Other  acetaminophen     Tablet .. 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)      FAMILY HISTORY:  FH: type 2 diabetes mellitus (Mother)    FH: HTN (hypertension) (Father)    FH: non-Hodgkin's lymphoma (Father)        ROS:    EYES:  Negative  blurry vision or double vision  GASTROINTESTINAL:  Negative for nausea, vomiting, diarrhea  -otherwise negative except for subjective    Vital Signs Last 24 Hrs  T(C): 36.9 (16 Jul 2023 16:05), Max: 37.4 (16 Jul 2023 05:13)  T(F): 98.4 (16 Jul 2023 16:05), Max: 99.3 (16 Jul 2023 05:13)  HR: 95 (16 Jul 2023 16:05) (84 - 99)  BP: 121/88 (16 Jul 2023 16:05) (108/64 - 125/87)  BP(mean): --  RR: 18 (16 Jul 2023 16:05) (17 - 18)  SpO2: 100% (16 Jul 2023 16:05) (97% - 100%)    Parameters below as of 16 Jul 2023 16:05  Patient On (Oxygen Delivery Method): room air        PE:  In no distress  HEENT:  NC, PERRL, sclerae anicteric, conjunctivae clear, EOMI.  Sinuses nontender, no nasal exudate.  No buccal or pharyngeal lesions, mild left pharyngeal erythema and some swelling, no exudate  Neck:  Supple, no adenopathy  Lungs:  No accessory muscle use, bilaterally clear to auscultation  Cor:  distant  Abd:  Symmetric, normoactive BS.  Soft, nontender, no masses, guarding or rebound.  Liver and spleen not enlarged  Extrem:  No cyanosis or edema  Skin:  No rashes.  Neuro: grossly intact  Musc: moving all limbs freely, no focal deficits        LABS:                        8.7    2.04  )-----------( 117      ( 16 Jul 2023 05:23 )             26.3       WBC Count: 2.04 K/uL (07-16-23 @ 05:23)  WBC Count: 2.15 K/uL (07-15-23 @ 13:56)      07-16    138  |  108  |  20  ----------------------------<  100<H>  5.3   |  19<L>  |  1.83<H>    Ca    8.8      16 Jul 2023 05:23  Phos  3.5     07-16  Mg     2.0     07-16    TPro  6.8  /  Alb  3.4  /  TBili  0.4  /  DBili  x   /  AST  70<H>  /  ALT  53<H>  /  AlkPhos  140<H>  07-16      Creatinine: 1.83 mg/dL (07-16-23 @ 05:23)  Creatinine: 1.97 mg/dL (07-15-23 @ 13:56)      Urinalysis Basic - ( 16 Jul 2023 05:23 )    Color: x / Appearance: x / SG: x / pH: x  Gluc: 100 mg/dL / Ketone: x  / Bili: x / Urobili: x   Blood: x / Protein: x / Nitrite: x   Leuk Esterase: x / RBC: x / WBC x   Sq Epi: x / Non Sq Epi: x / Bacteria: x      MICROBIOLOGY:      RADIOLOGY & ADDITIONAL STUDIES:    --< from: Xray Chest 1 View AP/PA (07.15.23 @ 15:11) >    ACC: [id number] EXAM:  XR CHEST AP OR PA 1V   ORDERED BY:  YASHIRA MIX     PROCEDURE DATE:  07/15/2023          INTERPRETATION:  EXAMINATION: XR CHEST    CLINICAL INDICATION: Fever for one week    TECHNIQUE: Single frontal, portable view of the chest was obtained.    COMPARISON: None available    FINDINGS:    The heart is normal in size.  The lungs are clear.  There is no pneumothorax or pleural effusion.    IMPRESSION:  Clear lungs.

## 2023-07-16 NOTE — PROGRESS NOTE ADULT - ASSESSMENT
31yo F w/ PMH of HTN, SLE, lupus nephritis Class III, CKDIII pw F/C, fatigue, sore throat and non-productive cough, meeting sepsis criteria w/ fever, HR and WBC count c/b pancytopenia and elevated liver enzymes c/f viral infection vs medication adverse effect vs SLE flare

## 2023-07-16 NOTE — PROGRESS NOTE ADULT - PROBLEM SELECTOR PLAN 1
- No hx of pancytopenia   - Recent sick contacts w/ fevers, elevated liver enzymes and hx of SLE on immunosuppression makes infectious etiology more likely vs Azathioprine also on ddx as cause of pancytopenia vs SLE flare vs less likely malignant  - CXR unremarkable, UA negative and RVP negative  - Will obtain strep swab (tender cervical LN w/ fever), HIV, hepatitis panel, parvovirus and EBV  - Will also obtain retic count, ferritin and LDH  - Obtain C3, C4, dsDNA and B12, folate  - c/w Cefepime for now given SIRS+ and immunocompromised  - Hold off on CT A/P given renal impairment      #Elevated LFTs  -No abd pain. F/u acute hep panel, RUQ US; trend LFTs

## 2023-07-16 NOTE — CONSULT NOTE ADULT - ASSESSMENT
33yo F w/ PMH of HTN, SLE, lupus nephritis Class III, CKDIII pw pancytopenia. Daron reports having recently been at a large wedding w/ over 500 people, some "sick" symptoms including her , uncle, and child. Reports fevers, chills, fatigue, non-productive cough, and sore throat, noted to have pancytopenia. She is on Azathioprine for SLE.    In the ED febrile to 100.4 w/ tachycardia to 111 neg RVP, bland UA w no hematuria    RECOMMENDATIONS  Certainly concerning with fever in an individual with an ANC of 1130, pancytopenia, but of note presentation suggesting viral illness, no suggestion of PNA on imaging or exam, bland UA and no urinary symptoms. So differential include infectious etiology such as viral including URI viral and others as well as bacterial process, lupus flair perhaps triggered by viral etiology, so rec  -cefepime for now  -follow results of micro  -results of lupus flair eval   hold AZA    Thank you for consulting us and involving us in the management of this most interesting and challenging case.  We will follow along in the care of this patient. Please call us at [phone] or text me directly on my cell# at [phone] with any concerns.   33yo F w/ PMH of HTN, SLE, lupus nephritis Class III, CKDIII pw pancytopenia. Daron reports having recently been at a large wedding w/ over 500 people, some "sick" symptoms including her , uncle, and child. Reports fevers, chills, fatigue, non-productive cough, and sore throat, noted to have pancytopenia. She is on Azathioprine for SLE.    In the ED febrile to 100.4 w/ tachycardia to 111 neg RVP, bland UA w no hematuria    RECOMMENDATIONS  Certainly concerning with fever in an individual with an ANC of 1130, pancytopenia, but of note presentation suggesting viral illness, no suggestion of PNA on imaging or exam, bland UA and no urinary symptoms. So differential include infectious etiology such as viral including URI viral and others as well as bacterial process, lupus flair perhaps triggered by viral etiology, so rec  -cefepime for now but with concern for 'serious infection' and eGFR 30-60 range will increase to q8  -follow results of micro  -results of lupus flair eval   bryce VARGAS    Thank you for consulting us and involving us in the management of this most interesting and challenging case.  We will follow along in the care of this patient. Please call us at [phone] or text me directly on my cell# at [phone] with any concerns.

## 2023-07-16 NOTE — PROGRESS NOTE ADULT - SUBJECTIVE AND OBJECTIVE BOX
SUBJECTIVE / OVERNIGHT EVENTS:  Patient was seen and examined at bedside. Denies CP, SOB, n/v, abd pain, dysuria.     MEDICATIONS  (STANDING):  amLODIPine   Tablet 10 milliGRAM(s) Oral daily  cefepime   IVPB 2000 milliGRAM(s) IV Intermittent every 12 hours  chlorhexidine 2% Cloths 1 Application(s) Topical daily  famotidine    Tablet 20 milliGRAM(s) Oral daily  hydroxychloroquine 200 milliGRAM(s) Oral daily    MEDICATIONS  (PRN):  acetaminophen     Tablet .. 650 milliGRAM(s) Oral every 6 hours PRN Temp greater or equal to 38C (100.4F), Mild Pain (1 - 3)      I&O's Summary      PHYSICAL EXAM:  Vital Signs Last 24 Hrs  T(C): 36.7 (16 Jul 2023 11:05), Max: 37.4 (16 Jul 2023 05:13)  T(F): 98.1 (16 Jul 2023 11:05), Max: 99.3 (16 Jul 2023 05:13)  HR: 98 (16 Jul 2023 11:05) (83 - 99)  BP: 116/83 (16 Jul 2023 11:05) (108/64 - 125/87)  BP(mean): --  RR: 18 (16 Jul 2023 11:05) (17 - 18)  SpO2: 99% (16 Jul 2023 11:05) (97% - 100%)    Parameters below as of 16 Jul 2023 11:05  Patient On (Oxygen Delivery Method): room air      CONSTITUTIONAL: NAD, well-developed, well-groomed  EYES: EOMI; conjunctiva and sclera clear  ENMT: Moist oral mucosa, no pharyngeal injection or exudates; normal dentition  NECK: Supple, trachea midline   RESPIRATORY: Normal respiratory effort; lungs are clear to auscultation bilaterally  CARDIOVASCULAR: Regular rate and rhythm, normal S1 and S2; No lower extremity edema; Peripheral pulses are 2+ bilaterally  ABDOMEN: Nontender to palpation, normoactive bowel sounds, no rebound/guarding  MUSCULOSKELETAL:  No clubbing or cyanosis of digits; no joint swelling or tenderness to palpation  PSYCH: A+O to person, place, and time; affect appropriate  NEUROLOGY: CN 2-12 grossly intact       LABS:                        8.7    2.04  )-----------( 117      ( 16 Jul 2023 05:23 )             26.3     07-16    138  |  108  |  20  ----------------------------<  100<H>  5.3   |  19<L>  |  1.83<H>    Ca    8.8      16 Jul 2023 05:23  Phos  3.5     07-16  Mg     2.0     07-16    TPro  6.8  /  Alb  3.4  /  TBili  0.4  /  DBili  x   /  AST  70<H>  /  ALT  53<H>  /  AlkPhos  140<H>  07-16    PT/INR - ( 16 Jul 2023 05:23 )   PT: 11.4 sec;   INR: 0.98 ratio         PTT - ( 16 Jul 2023 05:23 )  PTT:26.7 sec      Urinalysis Basic - ( 16 Jul 2023 05:23 )    Color: x / Appearance: x / SG: x / pH: x  Gluc: 100 mg/dL / Ketone: x  / Bili: x / Urobili: x   Blood: x / Protein: x / Nitrite: x   Leuk Esterase: x / RBC: x / WBC x   Sq Epi: x / Non Sq Epi: x / Bacteria: x        SARS-CoV-2: NotDetec (15 Jul 2023 13:55)

## 2023-07-17 ENCOUNTER — TRANSCRIPTION ENCOUNTER (OUTPATIENT)
Age: 33
End: 2023-07-17

## 2023-07-17 DIAGNOSIS — D61.818 OTHER PANCYTOPENIA: ICD-10-CM

## 2023-07-17 LAB
ALBUMIN SERPL ELPH-MCNC: 3.6 G/DL — SIGNIFICANT CHANGE UP (ref 3.3–5)
ALP SERPL-CCNC: 136 U/L — HIGH (ref 40–120)
ALT FLD-CCNC: 48 U/L — HIGH (ref 10–45)
ANION GAP SERPL CALC-SCNC: 14 MMOL/L — SIGNIFICANT CHANGE UP (ref 5–17)
APPEARANCE UR: CLEAR — SIGNIFICANT CHANGE UP
AST SERPL-CCNC: 51 U/L — HIGH (ref 10–40)
B BURGDOR C6 AB SER-ACNC: NEGATIVE — SIGNIFICANT CHANGE UP
B BURGDOR IGG+IGM SER-ACNC: 0.82 INDEX — SIGNIFICANT CHANGE UP (ref 0.01–0.89)
BACTERIA # UR AUTO: NEGATIVE — SIGNIFICANT CHANGE UP
BILIRUB SERPL-MCNC: 0.5 MG/DL — SIGNIFICANT CHANGE UP (ref 0.2–1.2)
BILIRUB UR-MCNC: NEGATIVE — SIGNIFICANT CHANGE UP
BUN SERPL-MCNC: 19 MG/DL — SIGNIFICANT CHANGE UP (ref 7–23)
C3 SERPL-MCNC: 110 MG/DL — SIGNIFICANT CHANGE UP (ref 81–157)
C4 SERPL-MCNC: 23 MG/DL — SIGNIFICANT CHANGE UP (ref 13–39)
CALCIUM SERPL-MCNC: 9.2 MG/DL — SIGNIFICANT CHANGE UP (ref 8.4–10.5)
CHLORIDE SERPL-SCNC: 105 MMOL/L — SIGNIFICANT CHANGE UP (ref 96–108)
CO2 SERPL-SCNC: 19 MMOL/L — LOW (ref 22–31)
COLOR SPEC: SIGNIFICANT CHANGE UP
CREAT ?TM UR-MCNC: 45 MG/DL — SIGNIFICANT CHANGE UP
CREAT SERPL-MCNC: 1.82 MG/DL — HIGH (ref 0.5–1.3)
DIFF PNL FLD: ABNORMAL
EBV DNA SERPL NAA+PROBE-ACNC: 934 IU/ML — HIGH
EBVPCR LOG: 2.97 LOG10IU/ML — HIGH
EGFR: 38 ML/MIN/1.73M2 — LOW
EPI CELLS # UR: 1 /HPF — SIGNIFICANT CHANGE UP
GLUCOSE SERPL-MCNC: 90 MG/DL — SIGNIFICANT CHANGE UP (ref 70–99)
GLUCOSE UR QL: NEGATIVE — SIGNIFICANT CHANGE UP
HAV IGM SER-ACNC: SIGNIFICANT CHANGE UP
HBV CORE IGM SER-ACNC: SIGNIFICANT CHANGE UP
HBV SURFACE AG SER-ACNC: SIGNIFICANT CHANGE UP
HCT VFR BLD CALC: 26.7 % — LOW (ref 34.5–45)
HCV AB S/CO SERPL IA: 0.2 S/CO — SIGNIFICANT CHANGE UP (ref 0–0.99)
HCV AB SERPL-IMP: SIGNIFICANT CHANGE UP
HGB BLD-MCNC: 8.9 G/DL — LOW (ref 11.5–15.5)
HYALINE CASTS # UR AUTO: 1 /LPF — SIGNIFICANT CHANGE UP (ref 0–2)
KETONES UR-MCNC: NEGATIVE — SIGNIFICANT CHANGE UP
LEUKOCYTE ESTERASE UR-ACNC: NEGATIVE — SIGNIFICANT CHANGE UP
MCHC RBC-ENTMCNC: 30 PG — SIGNIFICANT CHANGE UP (ref 27–34)
MCHC RBC-ENTMCNC: 33.3 GM/DL — SIGNIFICANT CHANGE UP (ref 32–36)
MCV RBC AUTO: 89.9 FL — SIGNIFICANT CHANGE UP (ref 80–100)
NITRITE UR-MCNC: NEGATIVE — SIGNIFICANT CHANGE UP
NRBC # BLD: 0 /100 WBCS — SIGNIFICANT CHANGE UP (ref 0–0)
PH UR: 5 — SIGNIFICANT CHANGE UP (ref 5–8)
PLATELET # BLD AUTO: 124 K/UL — LOW (ref 150–400)
POTASSIUM SERPL-MCNC: 4.9 MMOL/L — SIGNIFICANT CHANGE UP (ref 3.5–5.3)
POTASSIUM SERPL-SCNC: 4.9 MMOL/L — SIGNIFICANT CHANGE UP (ref 3.5–5.3)
PROT ?TM UR-MCNC: 24 MG/DL — HIGH (ref 0–12)
PROT SERPL-MCNC: 6.7 G/DL — SIGNIFICANT CHANGE UP (ref 6–8.3)
PROT UR-MCNC: ABNORMAL
PROT/CREAT UR-RTO: 0.5 RATIO — HIGH (ref 0–0.2)
RBC # BLD: 2.97 M/UL — LOW (ref 3.8–5.2)
RBC # FLD: 15.7 % — HIGH (ref 10.3–14.5)
RBC CASTS # UR COMP ASSIST: 4 /HPF — SIGNIFICANT CHANGE UP (ref 0–4)
SODIUM SERPL-SCNC: 138 MMOL/L — SIGNIFICANT CHANGE UP (ref 135–145)
SP GR SPEC: 1.01 — LOW (ref 1.01–1.02)
UROBILINOGEN FLD QL: NEGATIVE — SIGNIFICANT CHANGE UP
WBC # BLD: 2.36 K/UL — LOW (ref 3.8–10.5)
WBC # FLD AUTO: 2.36 K/UL — LOW (ref 3.8–10.5)
WBC UR QL: 1 /HPF — SIGNIFICANT CHANGE UP (ref 0–5)

## 2023-07-17 PROCEDURE — 99223 1ST HOSP IP/OBS HIGH 75: CPT

## 2023-07-17 PROCEDURE — 76700 US EXAM ABDOM COMPLETE: CPT | Mod: 26

## 2023-07-17 PROCEDURE — 99233 SBSQ HOSP IP/OBS HIGH 50: CPT

## 2023-07-17 PROCEDURE — 99222 1ST HOSP IP/OBS MODERATE 55: CPT

## 2023-07-17 RX ORDER — BENZOCAINE AND MENTHOL 5; 1 G/100ML; G/100ML
1 LIQUID ORAL ONCE
Refills: 0 | Status: COMPLETED | OUTPATIENT
Start: 2023-07-17 | End: 2023-07-17

## 2023-07-17 RX ADMIN — Medication 200 MILLIGRAM(S): at 13:02

## 2023-07-17 RX ADMIN — BENZOCAINE AND MENTHOL 1 LOZENGE: 5; 1 LIQUID ORAL at 07:21

## 2023-07-17 RX ADMIN — CEFEPIME 100 MILLIGRAM(S): 1 INJECTION, POWDER, FOR SOLUTION INTRAMUSCULAR; INTRAVENOUS at 05:09

## 2023-07-17 RX ADMIN — AMLODIPINE BESYLATE 10 MILLIGRAM(S): 2.5 TABLET ORAL at 05:09

## 2023-07-17 RX ADMIN — CHLORHEXIDINE GLUCONATE 1 APPLICATION(S): 213 SOLUTION TOPICAL at 13:02

## 2023-07-17 RX ADMIN — FAMOTIDINE 20 MILLIGRAM(S): 10 INJECTION INTRAVENOUS at 13:03

## 2023-07-17 RX ADMIN — CEFEPIME 100 MILLIGRAM(S): 1 INJECTION, POWDER, FOR SOLUTION INTRAMUSCULAR; INTRAVENOUS at 13:03

## 2023-07-17 NOTE — PROGRESS NOTE ADULT - TIME BILLING
reviewing documentation/labs/imaging, interviewing and examining patient, documentation, coordinating care with ACP/CM/Specialists
reviewing documentation/labs/imaging, interviewing and examining patient, documentation, coordinating care with ACP/CM/Specialists

## 2023-07-17 NOTE — CONSULT NOTE ADULT - CONSULT REASON
Thrombocytopenia and anemia
Hx of Lupus nephritis.  On Immunosuppression.
Hx of SLE with pancytopenia
fever

## 2023-07-17 NOTE — PROGRESS NOTE ADULT - SUBJECTIVE AND OBJECTIVE BOX
SUBJECTIVE / OVERNIGHT EVENTS:  Patient was seen and examined at bedside. Denies CP, SOB, n/v, abd pain, dysuria.     MEDICATIONS  (STANDING):  amLODIPine   Tablet 10 milliGRAM(s) Oral daily  cefepime   IVPB 2000 milliGRAM(s) IV Intermittent every 12 hours  chlorhexidine 2% Cloths 1 Application(s) Topical daily  famotidine    Tablet 20 milliGRAM(s) Oral daily  hydroxychloroquine 200 milliGRAM(s) Oral daily    MEDICATIONS  (PRN):  acetaminophen     Tablet .. 650 milliGRAM(s) Oral every 6 hours PRN Temp greater or equal to 38C (100.4F), Mild Pain (1 - 3)      I&O's Summary      PHYSICAL EXAM:  Vital Signs Last 24 Hrs  T(C): 36.7 (16 Jul 2023 11:05), Max: 37.4 (16 Jul 2023 05:13)  T(F): 98.1 (16 Jul 2023 11:05), Max: 99.3 (16 Jul 2023 05:13)  HR: 98 (16 Jul 2023 11:05) (83 - 99)  BP: 116/83 (16 Jul 2023 11:05) (108/64 - 125/87)  BP(mean): --  RR: 18 (16 Jul 2023 11:05) (17 - 18)  SpO2: 99% (16 Jul 2023 11:05) (97% - 100%)    Parameters below as of 16 Jul 2023 11:05  Patient On (Oxygen Delivery Method): room air      CONSTITUTIONAL: NAD, well-developed, well-groomed  EYES: EOMI; conjunctiva and sclera clear  ENMT: Moist oral mucosa, no pharyngeal injection or exudates; normal dentition  NECK: Supple, trachea midline   RESPIRATORY: Normal respiratory effort; lungs are clear to auscultation bilaterally  CARDIOVASCULAR: Regular rate and rhythm, normal S1 and S2; No lower extremity edema; Peripheral pulses are 2+ bilaterally  ABDOMEN: Nontender to palpation, normoactive bowel sounds, no rebound/guarding  MUSCULOSKELETAL:  No clubbing or cyanosis of digits; no joint swelling or tenderness to palpation  PSYCH: A+O to person, place, and time; affect appropriate  NEUROLOGY: CN 2-12 grossly intact     LABS:                         8.9    2.36  )-----------( 124      ( 17 Jul 2023 06:25 )             26.7     07-17    138  |  105  |  19  ----------------------------<  90  4.9   |  19<L>  |  1.82<H>    Ca    9.2      17 Jul 2023 06:27  Phos  3.5     07-16  Mg     2.0     07-16    TPro  6.7  /  Alb  3.6  /  TBili  0.5  /  DBili  x   /  AST  51<H>  /  ALT  48<H>  /  AlkPhos  136<H>  07-17    PT/INR - ( 16 Jul 2023 05:23 )   PT: 11.4 sec;   INR: 0.98 ratio         PTT - ( 16 Jul 2023 05:23 )  PTT:26.7 sec  Urinalysis Basic - ( 17 Jul 2023 06:27 )    Color: x / Appearance: x / SG: x / pH: x  Gluc: 90 mg/dL / Ketone: x  / Bili: x / Urobili: x   Blood: x / Protein: x / Nitrite: x   Leuk Esterase: x / RBC: x / WBC x   Sq Epi: x / Non Sq Epi: x / Bacteria: x

## 2023-07-17 NOTE — CONSULT NOTE ADULT - SUBJECTIVE AND OBJECTIVE BOX
[facility] DIVISION OF KIDNEY DISEASES AND HYPERTENSION -- [phone]  -- INITIAL CONSULT NOTE  --------------------------------------------------------------------------------  HPI:  Pt is a 33yo F w/ PMH of HTN, SLE, lupus nephritis Class III, CKDIII was sent here from the PCP after the abnormal lab results ( Pancytopenia)   She is on immunosuppression. She is on imuran as she was pregnant recently. She attended a wedding recently and many members fell sick after the wedding including her  and child. She is compliant with the medications and follow up appointments.  In ED, she was found to have fever 100.4 and tachycardia. She was started on IV abx - Cefepime empirically.      Nephro - Dr. Wilson.  No Known allergies.      PAST HISTORY  --------------------------------------------------------------------------------  PAST MEDICAL & SURGICAL HISTORY:  Lupus nephritis      Stage 3 chronic kidney disease      HTN (hypertension)      SLE (systemic lupus erythematosus)      [place] teeth removed        FAMILY HISTORY:  FH: type 2 diabetes mellitus (Mother)    FH: HTN (hypertension) (Father)    FH: non-Hodgkin's lymphoma (Father)      PAST SOCIAL HISTORY:    ALLERGIES & MEDICATIONS  --------------------------------------------------------------------------------  Allergies    No Known Allergies    Intolerances      Standing Inpatient Medications  amLODIPine   Tablet 10 milliGRAM(s) Oral daily  cefepime   IVPB 1000 milliGRAM(s) IV Intermittent every 8 hours  chlorhexidine 2% Cloths 1 Application(s) Topical daily  famotidine    Tablet 20 milliGRAM(s) Oral daily  hydroxychloroquine 200 milliGRAM(s) Oral daily    PRN Inpatient Medications  acetaminophen     Tablet .. 650 milliGRAM(s) Oral every 6 hours PRN      REVIEW OF SYSTEMS  --------------------------------------------------------------------------------  ROS was negative today.    VITALS/PHYSICAL EXAM  --------------------------------------------------------------------------------  T(C): 37.1 (07-17-23 @ 12:20), Max: 37.4 (07-16-23 @ 20:01)  HR: 105 (07-17-23 @ 12:20) (82 - 105)  BP: 109/78 (07-17-23 @ 12:20) (109/78 - 127/87)  RR: 19 (07-17-23 @ 12:20) (17 - 19)  SpO2: 95% (07-17-23 @ 12:20) (95% - 100%)  Wt(kg): --        07-16-23 @ 07:01  -  07-17-23 @ 07:00  --------------------------------------------------------  IN: 290 mL / OUT: 0 mL / NET: 290 mL    07-17-23 @ 07:01  -  07-17-23 @ 13:39  --------------------------------------------------------  IN: 50 mL / OUT: 0 mL / NET: 50 mL      Physical Exam:  	Gen: NAD  	HEENT: MMM  	Pulm: CTA B/L  	CV: S1S2 No added sounds.  	Abd: Soft, +BS   	Ext: No LE edema B/L  	Neuro: Awake  	Skin: Warm and dry  	    LABS/STUDIES  --------------------------------------------------------------------------------              8.9    2.36  >-----------<  124      [07-17-23 @ 06:25]              26.7     138  |  105  |  19  ----------------------------<  90      [07-17-23 @ 06:27]  4.9   |  19  |  1.82        Ca     9.2     [07-17-23 @ 06:27]      Mg     2.0     [07-16-23 @ 05:23]      Phos  3.5     [07-16-23 @ 05:23]    TPro  6.7  /  Alb  3.6  /  TBili  0.5  /  DBili  x   /  AST  51  /  ALT  48  /  AlkPhos  136  [07-17-23 @ 06:27]    PT/INR: PT 11.4 , INR 0.98       [07-16-23 @ 05:23]  PTT: 26.7       [07-16-23 @ 05:23]          [07-16-23 @ 05:23]    Creatinine Trend:  SCr 1.82 [07-17 @ 06:27]  SCr 1.83 [07-16 @ 05:23]  SCr 1.97 [07-15 @ 13:56]    Urinalysis - [07-17-23 @ 06:27]      Color  / Appearance  / SG  / pH       Gluc 90 / Ketone   / Bili  / Urobili        Blood  / Protein  / Leuk Est  / Nitrite       RBC  / WBC  / Hyaline  / Gran  / Sq Epi  / Non Sq Epi  / Bacteria       Iron 38, TIBC 311, %sat 12      [01-09-23 @ 13:20]  Ferritin 261      [07-16-23 @ 05:23]  Lipid: chol 130, , HDL 23, LDL --      [07-16-23 @ 05:23]    HBsAg Nonreact      [07-17-23 @ 06:28]  HCV 0.20, Nonreact      [07-17-23 @ 06:28]  HIV Nonreact      [07-16-23 @ 05:23]    dsDNA <12      [01-09-23 @ 18:26]  C3 Complement 110      [07-16-23 @ 05:23]  C4 Complement 23      [07-16-23 @ 05:23]  Syphilis Screen (Treponema Pallidum Ab) Negative      [01-05-23 @ 16:11]

## 2023-07-17 NOTE — CONSULT NOTE ADULT - ATTENDING COMMENTS
32-yr-old woman with known SLE, lupus nephritis admitted with fever, tachycardia and pancytopenia. SHe has h/o sick contact. Patient was on Azathioprine for a long time which Is now  on hold. Anemia and thrombocytopenia (TCP) may be due to Azathioprine  along with other causes. TCP is only mild and will not need any intervention; f/u with surveillance. She has long standing h/o <20 haptoglobin. She also has splenomegaly. 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 Please proceed with the w/u suggested in fellow's note. Supportive.
I have seen this patient with the fellow and agree with their assessment and plan. I was physically present for significant portions of the evaluation and management (E/M) service provided.  I agree with the above history, physical, and plan which I have reviewed and edited where appropriate.    d/w with pt, family and Rheum attending    Gen Hinojosa MD  Office   Contact me directly via Microsoft Teams     (After 5 pm or on weekends please page the on-call fellow/attending, can check [email or web address] for schedule. Login is lanny steiner, schedule under [facility] medicine, psych, derm)
pt seen and examined by me personally w/ spouse at bedside  agree w/ above  will follow up as outpatient w/ rheum MD our Chief Dr. Mejia  pt aware and in agreement with impression and plans

## 2023-07-17 NOTE — PROGRESS NOTE ADULT - PROBLEM SELECTOR PLAN 2
- Unclear etiology at this time though URI possibility  - s/p Cefepime in the ED, will continue for now  - f/u blood and ur cx
likely due to EBV. Supportive management. See above.

## 2023-07-17 NOTE — CONSULT NOTE ADULT - SUBJECTIVE AND OBJECTIVE BOX
HPI:  Pt is a 31yo F w/ PMH of HTN, SLE, lupus nephritis Class III, CKDIII pw pancytopenia.    She reports having recently been at a large wedding w/ over 500 people, some of whom reported "sick" symptoms including her , uncle, and child. Initially they had similar symptoms as her including fevers, chills, fatigue, non-productive cough, and sore throat, their symptoms resolved, hers, however, have persisted and gotten worse over the past week.    She presented to her outpatient provider on Thursday and underwent blood work which revealed pancytopenia and she was advised to present to the ED for further w/u. She otherwise denies any CP, SOB, palpitations, abd pain, N/V, constipation, diarrhea or urinary symptoms.     Of note she is on Azathioprine for SLE which she has been holding for the past 2 days.    In the ED febrile to 100.4 w/ tachycardia to 111 and pancytopenia. CXR w/out consolidation, RVP negative and UA w/out UTI. She was administered Cefepime, Tylenol and 1L LR.   (15 Jul 2023 21:56)      14 point ROS otherwise negative    PAST MEDICAL & SURGICAL HISTORY:  Lupus nephritis      Stage 3 chronic kidney disease      HTN (hypertension)      SLE (systemic lupus erythematosus)      [place] teeth removed          Allergies    No Known Allergies    Intolerances        MEDICATIONS  (STANDING):  amLODIPine   Tablet 10 milliGRAM(s) Oral daily  chlorhexidine 2% Cloths 1 Application(s) Topical daily  famotidine    Tablet 20 milliGRAM(s) Oral daily  hydroxychloroquine 200 milliGRAM(s) Oral daily    MEDICATIONS  (PRN):  acetaminophen     Tablet .. 650 milliGRAM(s) Oral every 6 hours PRN Temp greater or equal to 38C (100.4F), Mild Pain (1 - 3)      FAMILY HISTORY:  FH: type 2 diabetes mellitus (Mother)    FH: HTN (hypertension) (Father)    FH: non-Hodgkin's lymphoma (Father)        SOCIAL HISTORY: No EtOH, no tobacco        VITALS:   T(F): 98.8 (07-17-23 @ 12:20), Max: 99.4 (07-16-23 @ 20:01)  HR: 105 (07-17-23 @ 12:20)  BP: 109/78 (07-17-23 @ 12:20)  RR: 19 (07-17-23 @ 12:20)  SpO2: 95% (07-17-23 @ 12:20)  Wt(kg): --    PHYSICAL EXAM    GENERAL: NAD, well-developed  HEAD:  Atraumatic, Normocephalic  EYES: EOMI, PERRLA, conjunctiva and sclera clear  NECK: Supple, No JVD  CHEST/LUNG: Clear to auscultation bilaterally; No wheeze  HEART: Regular rate and rhythm; No murmurs, rubs, or gallops  ABDOMEN: Soft, Nontender, Nondistended; Bowel sounds present  EXTREMITIES:  2+ Peripheral Pulses, No clubbing, cyanosis, or edema  NEUROLOGY: non-focal  SKIN: No rashes or lesions    LABS:                         8.9    2.36  )-----------( 124      ( 17 Jul 2023 06:25 )             26.7     07-17    138  |  105  |  19  ----------------------------<  90  4.9   |  19<L>  |  1.82<H>    Ca    9.2      17 Jul 2023 06:27  Phos  3.5     07-16  Mg     2.0     07-16    TPro  6.7  /  Alb  3.6  /  TBili  0.5  /  DBili  x   /  AST  51<H>  /  ALT  48<H>  /  AlkPhos  136<H>  07-17      PT/INR - ( 16 Jul 2023 05:23 )   PT: 11.4 sec;   INR: 0.98 ratio         PTT - ( 16 Jul 2023 05:23 )  PTT:26.7 sec  Clean Catch Clean Catch (Midstream)  07-15 @ 13:57   <10,000 CFU/mL Normal Urogenital Nat  --  --      .Blood Blood-Peripheral  07-15 @ 13:50   No growth at 24 hours  --  --      .Blood Blood-Peripheral  07-15 @ 13:35   No growth at 24 hours  --  --          IMAGING:

## 2023-07-17 NOTE — CONSULT NOTE ADULT - PROBLEM SELECTOR RECOMMENDATION 9
Pt has pancytopenia and fever.  She is on immunosuppession.  Lupus flare vs infection  Renal function - stable.        PLAN;  follow up with blood cultures. negative so far.  We can consider putting her back on cellcept as the pt delivered the baby 6 months ago.  Follow up with Rheum consult and Heme consult.  Please follow up with Parvovirus B19 PCR  Please send the ESR and CRP  Trend BMP and monitor.  Renally dose all the medications and avoid nephrotoxic medications as much as possible. Pt has pancytopenia and fever.  She is on immunosuppession.  Lupus flare vs infection  Renal function - stable.        PLAN;  follow up with blood cultures. negative so far.  We can consider putting her back on MMF as the pt delivered the baby 6 months ago and if not breastfeeding and if no active infection  Follow up with Rheum consult and Heme consult for potential bone marrow  Hemolysis vs TMA in ddx given low hapto and rising LDH  Please follow up with Parvovirus B19 PCR  Sent urinalysis and urine tp/crt ratio to make sure no SLE nephritis flare  She was planned for renal bx outpatient, if infection ruled out, will plan for that here as well  Please send the ESR and CRP  Trend BMP and monitor.  Renally dose all the medications and avoid nephrotoxic medications as much as possible.

## 2023-07-17 NOTE — PROGRESS NOTE ADULT - PROBLEM SELECTOR PLAN 1
CXR unremarkable, UA negative. BCX NGTD. RVP, HIV, acute hepatitis panel negative  Neutropenic fever likely due to EBV   -ID following   -Stop cefepime   -supportive care for EBV  -Mild transaminitis - improving. RUQ US reviewed.     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 Rec warner studies (iron, TIBC, ferritin, T sat), G6PD, osmotic fragility test, cold agglutinin, electrophoresis

## 2023-07-17 NOTE — PROGRESS NOTE ADULT - PROBLEM SELECTOR PLAN 3
- Hold Azathioprine for now given pancytopenia and fevers  - c/w hydroxychloroquine   - seen by rheum - recheck dsDNA, GRANT, and P/Cr. Will f/u outpatient
- Hold Azathioprine for now given pancytopenia and fevers  - c/w hydroxychloroquine   - f/u C3, C4 and dsDNA  - rheum eval (follows w/ Dr. Jackie mccormack who advised her to present to ED) in AM.

## 2023-07-17 NOTE — CONSULT NOTE ADULT - SUBJECTIVE AND OBJECTIVE BOX
PIA PAZ  [id number]    HISTORY OF PRESENT ILLNESS:    32-year-old F w/hx of SLE/LN admitted pancytopenia with episode of fever. Rheumatology was consulted for further workup    On admission, her VS was remarkable for , [place] temp of 100.3. Labs showed WBC 2.04, H/H 8.8/26.4, platelet 123, lympho and neutropenia, retic cound increased, creatinine 1.83, HCO3- 19, , AST/ALT 70/53, ferritin 261. Hemolytic panel was +ve w/elevate , and low hapto <20. Lupus marker showed normal complement. UA was bland w/protein of 30 mg/dl.      Consult day  Patient called the office on 7/13 w/fever and SOB after she came from a wedding. Her fever was not improving as well as her constitutional symptoms.       #SLE/LN  Dx 2019  Serology: negative for dsDNA  Rheumatologist Dr. Mejia  Clinical manifestation: fever, LN  Kidney bx showed 6/2020: class III LN w/mesangial proliferation, electron dense deposits, 50% interstitial fibrosis, global 56%, and focal 22% glomerulosclerosis.   Last creatinine on 6/16 1.92 (baseline 1.8-2.8)  Previous treatment high dose steorid, MMF (failed-was not able to tolerate GI)  Current treatment: Aza 100 mg/daily,  mg daily,   Last seen in the clinic 7/10  Last lab on 6/16 showed no hypocomplementemia, P/Cr 0.7, -ve dsDNA    #+ve APS lab  serology: [facility]  Previous hx: pre-eclampsia but no miscarriage         PAST MEDICAL & SURGICAL HISTORY:  Lupus nephritis      Stage 3 chronic kidney disease      HTN (hypertension)      SLE (systemic lupus erythematosus)      [place] teeth removed          Review of Systems:  Gen:  No fevers/chills, weight loss  HEENT: No blurry vision, no difficulty swallowing, no oral or nasal ulcers  CVS: No chest pain/palpitations  Resp: No SOB/wheezing  GI: No N/V/C/D/abdominal pain  MSK:  Skin: No new rashes  Neuro: No headaches    MEDICATIONS  (STANDING):  amLODIPine   Tablet 10 milliGRAM(s) Oral daily  cefepime   IVPB 1000 milliGRAM(s) IV Intermittent every 8 hours  chlorhexidine 2% Cloths 1 Application(s) Topical daily  famotidine    Tablet 20 milliGRAM(s) Oral daily  hydroxychloroquine 200 milliGRAM(s) Oral daily    MEDICATIONS  (PRN):  acetaminophen     Tablet .. 650 milliGRAM(s) Oral every 6 hours PRN Temp greater or equal to 38C (100.4F), Mild Pain (1 - 3)      Allergies    No Known Allergies    Intolerances        PERTINENT MEDICATION HISTORY:    SOCIAL HISTORY:  OCCUPATION:  TRAVEL HISTORY:    FAMILY HISTORY:  FH: type 2 diabetes mellitus (Mother)    FH: HTN (hypertension) (Father)    FH: non-Hodgkin's lymphoma (Father)        Vital Signs Last 24 Hrs  T(C): 37.1 (17 Jul 2023 12:20), Max: 37.4 (16 Jul 2023 20:01)  T(F): 98.8 (17 Jul 2023 12:20), Max: 99.4 (16 Jul 2023 20:01)  HR: 105 (17 Jul 2023 12:20) (82 - 105)  BP: 109/78 (17 Jul 2023 12:20) (109/78 - 127/87)  BP(mean): --  RR: 19 (17 Jul 2023 12:20) (17 - 19)  SpO2: 95% (17 Jul 2023 12:20) (95% - 100%)    Parameters below as of 17 Jul 2023 12:20  Patient On (Oxygen Delivery Method): room air        Physical Exam:  General: No apparent distress  HEENT: EOMI, MMM  CVS: +S1/S2, RRR, no murmurs/rubs/gallops  Resp: CTA b/l. No crackles/wheezing  GI: Soft, NT/ND +BS  MSK:  Neuro: AAOx3  Skin: no visible rashes    LABS:                        8.9    2.36  )-----------( 124      ( 17 Jul 2023 06:25 )             26.7     07-17    138  |  105  |  19  ----------------------------<  90  4.9   |  19<L>  |  1.82<H>    Ca    9.2      17 Jul 2023 06:27  Phos  3.5     07-16  Mg     2.0     07-16    TPro  6.7  /  Alb  3.6  /  TBili  0.5  /  DBili  x   /  AST  51<H>  /  ALT  48<H>  /  AlkPhos  136<H>  07-17    PT/INR - ( 16 Jul 2023 05:23 )   PT: 11.4 sec;   INR: 0.98 ratio         PTT - ( 16 Jul 2023 05:23 )  PTT:26.7 sec  Urinalysis Basic - ( 17 Jul 2023 06:27 )    Color: x / Appearance: x / SG: x / pH: x  Gluc: 90 mg/dL / Ketone: x  / Bili: x / Urobili: x   Blood: x / Protein: x / Nitrite: x   Leuk Esterase: x / RBC: x / WBC x   Sq Epi: x / Non Sq Epi: x / Bacteria: x      Manual Differential (07.16.23 @ 05:23)   Manual Smear Verification: Performed  Platelet Morphology: Normal  Reactive Lymphocytes %: 2.6 %  Anisocytosis: Moderate  Red Cell Morphology: Abnormal  Elliptocytes: Slight  Poikilocytosis: Slight  Microcytosis: Moderate  Band Neutrophils %: 0.9 %  Metamyelocytes %: 0.9 %Lactate Dehydrogenase, Serum in AM (07.16.23 @ 05:23)   Lactate Dehydrogenase, Serum: 622 U/LFerritin in AM (07.16.23 @ 05:23)   Ferritin: 261 ng/mLC4 Complement, Serum (07.16.23 @ 05:23)   C4 Complement, Serum: 23 mg/dLC3 Complement, Serum (07.16.23 @ 05:23)   C3 Complement, Serum: 110 mg/dL  RADIOLOGY & ADDITIONAL STUDIES:  < from: Xray Chest 1 View AP/PA (07.15.23 @ 15:11) >  Clear lungs.    < end of copied text >     PIA PAZ  [id number]    HISTORY OF PRESENT ILLNESS:    32-year-old F w/hx of SLE/LN admitted pancytopenia with episode of fever. Rheumatology was consulted for further workup    On admission, her VS was remarkable for , high temp of 100.3F. Labs showed WBC 2.04, H/H 8.8/26.4, platelet 123, lympho and neutropenia, retic cound increased, creatinine 1.83, HCO3- 19, , AST/ALT 70/53, ferritin 261. Hemolytic panel was +ve w/elevate , and low hapto <20. Lupus marker showed normal complement. UA was bland w/protein of 30 mg/dl.      Consult day  Patient called the office on  w/fever and SOB. Patient stated that she developed fever around 7/10 when she went to a wedding. She endorsed feeling sore throat and subsequently also noticed that her child was sick. Patient stated that her fever did not improve since then.     ROS:  Positive: sore throat, fever, night sweats, dry cough  Negative: sicca symptoms, RF, fevers, chills, night sweats, eye pain, eye redness, vision changes, photosensitivity, abdominal pain, n/v/d/c, changes in urinary freq, dysuria, hematuria, foamy urine      #SLE/LN  Dx 2019  Serology: negative for dsDNA  Rheumatologist Dr. Mejia  Clinical manifestation: fever, LN  Kidney bx showed 2020: class III LN w/mesangial proliferation, electron dense deposits, 50% interstitial fibrosis, global 56%, and focal 22% glomerulosclerosis.   Last creatinine on  1.92 (baseline 1.8-2.8)  Previous treatment high dose steroid, MMF (failed-was not able to tolerate GI)  Current treatment: Aza 100 mg/daily,  mg daily,   Last seen in the clinic 7/10  Last lab on  showed no hypocomplementemia, P/Cr 0.7, -ve dsDNA    #+ve APS lab  serology: L[facility]  Previous hx: pre-eclampsia but no miscarriage         PAST MEDICAL & SURGICAL HISTORY:  Lupus nephritis      Stage 3 chronic kidney disease      HTN (hypertension)      SLE (systemic lupus erythematosus)      [place] teeth removed          Review of Systems:  Gen:  No fevers/chills, weight loss  HEENT: No blurry vision, no difficulty swallowing, no oral or nasal ulcers  CVS: No chest pain/palpitations  Resp: No SOB/wheezing  GI: No N/V/C/D/abdominal pain  MSK:  Skin: No new rashes  Neuro: No headaches    MEDICATIONS  (STANDING):  amLODIPine   Tablet 10 milliGRAM(s) Oral daily  cefepime   IVPB 1000 milliGRAM(s) IV Intermittent every 8 hours  chlorhexidine 2% Cloths 1 Application(s) Topical daily  famotidine    Tablet 20 milliGRAM(s) Oral daily  hydroxychloroquine 200 milliGRAM(s) Oral daily    MEDICATIONS  (PRN):  acetaminophen     Tablet .. 650 milliGRAM(s) Oral every 6 hours PRN Temp greater or equal to 38C (100.4F), Mild Pain (1 - 3)      Allergies    No Known Allergies    Intolerances        PERTINENT MEDICATION HISTORY:    SOCIAL HISTORY:  OCCUPATION:  TRAVEL HISTORY:    FAMILY HISTORY:  FH: type 2 diabetes mellitus (Mother)    FH: HTN (hypertension) (Father)    FH: non-Hodgkin's lymphoma (Father)        Vital Signs Last 24 Hrs  T(C): 37.1 (2023 12:20), Max: 37.4 (2023 20:01)  T(F): 98.8 (2023 12:20), Max: 99.4 (2023 20:01)  HR: 105 (2023 12:20) (82 - 105)  BP: 109/78 (2023 12:20) (109/78 - 127/87)  BP(mean): --  RR: 19 (2023 12:20) (17 - 19)  SpO2: 95% (2023 12:20) (95% - 100%)    Parameters below as of 2023 12:20  Patient On (Oxygen Delivery Method): room air        Physical Exam:  General: No apparent distress  HEENT: EOMI, MMM  CVS: +S1/S2, RRR, no murmurs/rubs/gallops  Resp: CTA b/l. No crackles/wheezing  GI: Soft, NT/ND +BS  MSK:  Neuro: AAOx3  Skin: no visible rashes    LABS:                        8.9    2.36  )-----------( 124      ( 2023 06:25 )             26.7     07-17    138  |  105  |  19  ----------------------------<  90  4.9   |  19<L>  |  1.82<H>    Ca    9.2      2023 06:27  Phos  3.5       Mg     2.0         TPro  6.7  /  Alb  3.6  /  TBili  0.5  /  DBili  x   /  AST  51<H>  /  ALT  48<H>  /  AlkPhos  136<H>      PT/INR - ( 2023 05:23 )   PT: 11.4 sec;   INR: 0.98 ratio         PTT - ( 2023 05:23 )  PTT:26.7 sec  Urinalysis Basic - ( 2023 06:27 )    Color: x / Appearance: x / SG: x / pH: x  Gluc: 90 mg/dL / Ketone: x  / Bili: x / Urobili: x   Blood: x / Protein: x / Nitrite: x   Leuk Esterase: x / RBC: x / WBC x   Sq Epi: x / Non Sq Epi: x / Bacteria: x      Manual Differential (23 @ 05:23)   Manual Smear Verification: Performed  Platelet Morphology: Normal  Reactive Lymphocytes %: 2.6 %  Anisocytosis: Moderate  Red Cell Morphology: Abnormal  Elliptocytes: Slight  Poikilocytosis: Slight  Microcytosis: Moderate  Band Neutrophils %: 0.9 %  Metamyelocytes %: 0.9 %Lactate Dehydrogenase, Serum in AM (23 @ 05:23)   Lactate Dehydrogenase, Serum: 622 U/LFerritin in AM (23 @ 05:23)   Ferritin: 261 ng/mLC4 Complement, Serum (23 @ 05:23)   C4 Complement, Serum: 23 mg/dLC3 Complement, Serum (23 @ 05:23)   C3 Complement, Serum: 110 mg/dL    Tony Barr Virus DNA by PCR - Blood: 934 IU/mL (23 @ 05:23)   EBVPCR Lo.97: Assay Dynamic Range: 35 to 1.0E+08 IU/mL (1.54 to 8.00 Log10 IU/mL)   Assay lower limit of quantification (LLOQ) is 35 IU/mL (1.54 Log10 IU/mL)   EBV DNA detected below the LLOQ will be reported as Detected < 35 IU/mL   (<1.54 Log10 IU/mL)   RADIOLOGY & ADDITIONAL STUDIES:  < from: Xray Chest 1 View AP/PA (07.15.23 @ 15:11) >  Clear lungs.    < end of copied text >     PIA PAZ  [id number]    HISTORY OF PRESENT ILLNESS:    32-year-old F w/hx of SLE/LN admitted pancytopenia with episode of fever. Rheumatology was consulted for further workup    On admission, her VS was remarkable for , high temp of 100.3F. Labs showed WBC 2.04, H/H 8.8/26.4, platelet 123, lympho and neutropenia, retic cound increased, creatinine 1.83, HCO3- 19, , AST/ALT 70/53, ferritin 261. Hemolytic panel was +ve w/elevate , and low hapto <20. Lupus marker showed normal complement. UA was bland w/protein of 30 mg/dl.      Consult day  Patient called the office on  w/fever and SOB. Patient stated that she developed fever around 7/10 when she went to a wedding. She endorsed feeling sore throat and subsequently also noticed that her child was sick. Patient stated that her fever did not improve since then.     ROS:  Positive: sore throat, fever, night sweats, dry cough  Negative: sicca symptoms, RF, fevers, chills, night sweats, eye pain, eye redness, vision changes, photosensitivity, abdominal pain, n/v/d/c, changes in urinary freq, dysuria, hematuria, foamy urine      #SLE/LN  Dx 2019  Serology: negative for dsDNA  Rheumatologist Dr. Mejia  Clinical manifestation: fever, LN  Kidney bx showed 2020: class III LN w/mesangial proliferation, electron dense deposits, 50% interstitial fibrosis, global 56%, and focal 22% glomerulosclerosis.   Last creatinine on  1.92 (baseline 1.8-2.8)  Previous treatment high dose steroid, MMF (failed-was not able to tolerate GI)  Current treatment: Aza 100 mg/daily,  mg daily,   Last seen in the clinic 7/10  Last lab on  showed no hypocomplementemia, P/Cr 0.7, -ve dsDNA    #+ve APS lab  serology: [facility]  Previous hx: pre-eclampsia but no miscarriage         PAST MEDICAL & SURGICAL HISTORY:  Lupus nephritis      Stage 3 chronic kidney disease      HTN (hypertension)      SLE (systemic lupus erythematosus)      [place] teeth removed          Review of Systems:  See H&P    MEDICATIONS  (STANDING):  amLODIPine   Tablet 10 milliGRAM(s) Oral daily  cefepime   IVPB 1000 milliGRAM(s) IV Intermittent every 8 hours  chlorhexidine 2% Cloths 1 Application(s) Topical daily  famotidine    Tablet 20 milliGRAM(s) Oral daily  hydroxychloroquine 200 milliGRAM(s) Oral daily    MEDICATIONS  (PRN):  acetaminophen     Tablet .. 650 milliGRAM(s) Oral every 6 hours PRN Temp greater or equal to 38C (100.4F), Mild Pain (1 - 3)      Allergies    No Known Allergies    Intolerances        PERTINENT MEDICATION HISTORY:    SOCIAL HISTORY:  OCCUPATION:  TRAVEL HISTORY:    FAMILY HISTORY:  FH: type 2 diabetes mellitus (Mother)    FH: HTN (hypertension) (Father)    FH: non-Hodgkin's lymphoma (Father)        Vital Signs Last 24 Hrs  T(C): 37.1 (2023 12:20), Max: 37.4 (2023 20:01)  T(F): 98.8 (2023 12:20), Max: 99.4 (2023 20:01)  HR: 105 (2023 12:20) (82 - 105)  BP: 109/78 (2023 12:20) (109/78 - 127/87)  BP(mean): --  RR: 19 (2023 12:20) (17 - 19)  SpO2: 95% (2023 12:20) (95% - 100%)    Parameters below as of 2023 12:20  Patient On (Oxygen Delivery Method): room air        Physical Exam:  General: No apparent distress  HEENT: EOMI, MMM, erythematous throat   CVS: +S1/S2, RRR, no murmurs/rubs/gallops  Resp: CTA b/l. No crackles/wheezing  GI: Soft, NT/ND +BS  MSK: no sign of tenosynovitis and good ROM  Neuro: AAOx3  Skin: no visible rashes    LABS:                        8.9    2.36  )-----------( 124      ( 2023 06:25 )             26.7     07-17    138  |  105  |  19  ----------------------------<  90  4.9   |  19<L>  |  1.82<H>    Ca    9.2      2023 06:27  Phos  3.5     07-16  Mg     2.0     -    TPro  6.7  /  Alb  3.6  /  TBili  0.5  /  DBili  x   /  AST  51<H>  /  ALT  48<H>  /  AlkPhos  136<H>      PT/INR - ( 2023 05:23 )   PT: 11.4 sec;   INR: 0.98 ratio         PTT - ( 2023 05:23 )  PTT:26.7 sec  Urinalysis Basic - ( 2023 06:27 )    Color: x / Appearance: x / SG: x / pH: x  Gluc: 90 mg/dL / Ketone: x  / Bili: x / Urobili: x   Blood: x / Protein: x / Nitrite: x   Leuk Esterase: x / RBC: x / WBC x   Sq Epi: x / Non Sq Epi: x / Bacteria: x      Manual Differential (23 @ 05:23)   Manual Smear Verification: Performed  Platelet Morphology: Normal  Reactive Lymphocytes %: 2.6 %  Anisocytosis: Moderate  Red Cell Morphology: Abnormal  Elliptocytes: Slight  Poikilocytosis: Slight  Microcytosis: Moderate  Band Neutrophils %: 0.9 %  Metamyelocytes %: 0.9 %Lactate Dehydrogenase, Serum in AM (23 @ 05:23)   Lactate Dehydrogenase, Serum: 622 U/LFerritin in AM (23 @ 05:23)   Ferritin: 261 ng/mLC4 Complement, Serum (23 @ 05:23)   C4 Complement, Serum: 23 mg/dLC3 Complement, Serum (23 @ 05:23)   C3 Complement, Serum: 110 mg/dL    Tony Barr Virus DNA by PCR - Blood: 934 IU/mL (23 @ 05:23)   EBVPCR Lo.97: Assay Dynamic Range: 35 to 1.0E+08 IU/mL (1.54 to 8.00 Log10 IU/mL)   Assay lower limit of quantification (LLOQ) is 35 IU/mL (1.54 Log10 IU/mL)   EBV DNA detected below the LLOQ will be reported as Detected < 35 IU/mL   (<1.54 Log10 IU/mL)   RADIOLOGY & ADDITIONAL STUDIES:  < from: Xray Chest 1 View AP/PA (07.15.23 @ 15:11) >  Clear lungs.    < end of copied text >

## 2023-07-17 NOTE — PROGRESS NOTE ADULT - SUBJECTIVE AND OBJECTIVE BOX
[facility]FECTIOUS DISEASES  LV Marley Y. Patel, S. Shah, G. Dakotah  [phone]  ([phone] - weekdays after 5pm and weekends)    Name: PIA PAZ  Age/Gender: 32y Female  MRN: [id number]    Interval History:  Patient seen and examined this morning.   Continues to feel fatigued, Tm 99.4 over 24h.  Notes reviewed. No concerning overnight events.  Allergies: No Known Allergies      Objective:  Vitals:   T(F): 98.9 (07-17-23 @ 08:24), Max: 99.4 (07-16-23 @ 20:01)  HR: 93 (07-17-23 @ 08:24) (82 - 97)  BP: 117/80 (07-17-23 @ 08:24) (114/82 - 127/87)  RR: 17 (07-17-23 @ 08:24) (17 - 18)  SpO2: 97% (07-17-23 @ 08:24) (97% - 100%)  Physical Examination:  General: no acute distress  HEENT: NC/AT, EOMI, anicteric, neck supple  Cardio: S1, S2 present, normal rate, no murmur  Resp: clear to auscultation bilaterally  Abd: soft, NT, ND, + BS  Neuro: AAOx3, no obvious focal deficits  Ext: no edema, no cyanosis, moving extremities  Skin: warm, dry, no visible rash  Psych: appropriate affect and mood for situation  Lines: PIV    Laboratory Studies:  CBC:                       8.9    2.36  )-----------( 124      ( 17 Jul 2023 06:25 )             26.7     WBC Trend:  2.36 07-17-23 @ 06:25  2.04 07-16-23 @ 05:23  2.15 07-15-23 @ 13:56    CMP: 07-17    138  |  105  |  19  ----------------------------<  90  4.9   |  19<L>  |  1.82<H>    Ca    9.2      17 Jul 2023 06:27  Phos  3.5     07-16  Mg     2.0     07-16    TPro  6.7  /  Alb  3.6  /  TBili  0.5  /  DBili  x   /  AST  51<H>  /  ALT  48<H>  /  AlkPhos  136<H>  07-17    Creatinine: 1.82 mg/dL (07-17-23 @ 06:27)  Creatinine: 1.83 mg/dL (07-16-23 @ 05:23)  Creatinine: 1.97 mg/dL (07-15-23 @ 13:56)    LIVER FUNCTIONS - ( 17 Jul 2023 06:27 )  Alb: 3.6 g/dL / Pro: 6.7 g/dL / ALK PHOS: 136 U/L / ALT: 48 U/L / AST: 51 U/L / GGT: x           Microbiology: reviewed   Culture - Urine (collected 07-15-23 @ 13:57)  Source: Clean Catch Clean Catch (Midstream)  Final Report (07-16-23 @ 19:43):    <10,000 CFU/mL Normal Urogenital Nat    Culture - Blood (collected 07-15-23 @ 13:50)  Source: .Blood Blood-Peripheral  Preliminary Report (07-16-23 @ 20:01):    No growth at 24 hours    Culture - Blood (collected 07-15-23 @ 13:35)  Source: .Blood Blood-Peripheral  Preliminary Report (07-16-23 @ 20:01):    No growth at 24 hours    Radiology: reviewed     Medications:  acetaminophen     Tablet .. 650 milliGRAM(s) Oral every 6 hours PRN  amLODIPine   Tablet 10 milliGRAM(s) Oral daily  cefepime   IVPB 1000 milliGRAM(s) IV Intermittent every 8 hours  chlorhexidine 2% Cloths 1 Application(s) Topical daily  famotidine    Tablet 20 milliGRAM(s) Oral daily  hydroxychloroquine 200 milliGRAM(s) Oral daily    Current Antimicrobials:  cefepime   IVPB 1000 milliGRAM(s) IV Intermittent every 8 hours  hydroxychloroquine 200 milliGRAM(s) Oral daily    Prior/Completed Antimicrobials:  cefepime   IVPB   [facility]  [facility] Marley Y. Patel, S. Shah, G. Dakotah  [phone]  ([phone] - weekdays after 5pm and weekends)    Name: PIA PAZ  Age/Gender: 32y Female  MRN: [id number]    Interval History:  Patient seen and examined this morning.   Continues to feel fatigued, Tm 99.4 over 24h.  Notes reviewed. No concerning overnight events.  Allergies: No Known Allergies      Objective:  Vitals:   T(F): 98.9 (07-17-23 @ 08:24), Max: 99.4 (07-16-23 @ 20:01)  HR: 93 (07-17-23 @ 08:24) (82 - 97)  BP: 117/80 (07-17-23 @ 08:24) (114/82 - 127/87)  RR: 17 (07-17-23 @ 08:24) (17 - 18)  SpO2: 97% (07-17-23 @ 08:24) (97% - 100%)  Physical Examination:  General: no acute distress  HEENT: NC/AT, EOMI, anicteric, neck supple  Cardio: S1, S2 present, normal rate, no murmur  Resp: clear to auscultation bilaterally  Abd: soft, NT, ND, + BS  Neuro: AAOx3, no obvious focal deficits  Ext: no edema, no cyanosis, moving extremities  Skin: warm, dry, no visible rash  Psych: appropriate affect and mood for situation  Lines: PIV    Laboratory Studies:  CBC:                       8.9    2.36  )-----------( 124      ( 17 Jul 2023 06:25 )             26.7     WBC Trend:  2.36 07-17-23 @ 06:25  2.04 07-16-23 @ 05:23  2.15 07-15-23 @ 13:56    CMP: 07-17    138  |  105  |  19  ----------------------------<  90  4.9   |  19<L>  |  1.82<H>    Ca    9.2      17 Jul 2023 06:27  Phos  3.5     07-16  Mg     2.0     07-16    TPro  6.7  /  Alb  3.6  /  TBili  0.5  /  DBili  x   /  AST  51<H>  /  ALT  48<H>  /  AlkPhos  136<H>  07-17    Creatinine: 1.82 mg/dL (07-17-23 @ 06:27)  Creatinine: 1.83 mg/dL (07-16-23 @ 05:23)  Creatinine: 1.97 mg/dL (07-15-23 @ 13:56)    LIVER FUNCTIONS - ( 17 Jul 2023 06:27 )  Alb: 3.6 g/dL / Pro: 6.7 g/dL / ALK PHOS: 136 U/L / ALT: 48 U/L / AST: 51 U/L / GGT: x           Microbiology: reviewed   Culture - Urine (collected 07-15-23 @ 13:57)  Source: Clean Catch Clean Catch (Midstream)  Final Report (07-16-23 @ 19:43):    <10,000 CFU/mL Normal Urogenital Nat    Culture - Blood (collected 07-15-23 @ 13:50)  Source: .Blood Blood-Peripheral  Preliminary Report (07-16-23 @ 20:01):    No growth at 24 hours    Culture - Blood (collected 07-15-23 @ 13:35)  Source: .Blood Blood-Peripheral  Preliminary Report (07-16-23 @ 20:01):    No growth at 24 hours    Radiology: reviewed   < from: US Abdomen Complete (US Abdomen Complete .) (07.17.23 @ 11:33) >  IMPRESSION:  Bilateral increased renal echogenicity, which may represent renal medical   disease. Suggest clinical and laboratory correlation.    Splenomegaly.    < end of copied text >    Medications:  acetaminophen     Tablet .. 650 milliGRAM(s) Oral every 6 hours PRN  amLODIPine   Tablet 10 milliGRAM(s) Oral daily  cefepime   IVPB 1000 milliGRAM(s) IV Intermittent every 8 hours  chlorhexidine 2% Cloths 1 Application(s) Topical daily  famotidine    Tablet 20 milliGRAM(s) Oral daily  hydroxychloroquine 200 milliGRAM(s) Oral daily    Current Antimicrobials:  cefepime   IVPB 1000 milliGRAM(s) IV Intermittent every 8 hours  hydroxychloroquine 200 milliGRAM(s) Oral daily    Prior/Completed Antimicrobials:  cefepime   IVPB

## 2023-07-17 NOTE — PROGRESS NOTE ADULT - PROBLEM SELECTOR PLAN 4
- Renal outpt made aware of admission  - Cr appears at baseline since recent pregnancy (6 months ago)
- Renal outpt made aware of admission  - Cr appears at baseline since recent pregnancy (6 months ago)

## 2023-07-17 NOTE — CONSULT NOTE ADULT - ASSESSMENT
32-year-old F w/hx of SLE/LN admitted pancytopenia with episode of fever. Rheumatology was consulted for further workup    #Pancytopenia      #SLE/LN      Plan 32-year-old F w/hx of SLE/LN admitted pancytopenia with episode of fever. Rheumatology was consulted for further workup    #Pancytopenia  Last blood checked on 6/16/23 showed H/H baseline 12/34.1 platelet 240  Presented w/H/H 8.8/26.4, platelet 123, hemolytic panel was +ve w/elevate , and low hapto <20  Patient was Azathioprine 100 mg daily and started feeling fever and fatigue since 7/9 after a wedding. Patient also stated that her son was feeling sick  ROS: +ve sore throat, fever, dry cough   PCR EBV was elevated   Likely infection w/possible lupus flare up. 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 Avina     Serology: normal complement     Plan  hold Azathioprine for now since patient likely has infection   Her lupus markers were always negative, therefore her to access her disease activity. We will recheck dsDNA, GRANT, and P/Cr  Recommend expand other infection such as CMV, upper full respiratory panel   We will update her outpatient rheumatologist, Dr. Mejia    Note is incomplete, please wait for attending attestation  DW Dr. David Ley MD  PGY-5 Rheumatology Fellow  Pager: [phone]   Available in teams  32-year-old F w/hx of SLE/LN admitted pancytopenia with episode of fever. Rheumatology was consulted for further workup    #Pancytopenia  Last blood checked on 6/16/23 showed H/H baseline 12/34.1 platelet 240  Presented w/H/H 8.8/26.4, platelet 123, hemolytic panel was +ve w/elevate , and low hapto <20  Patient was Azathioprine 100 mg daily and started feeling fever and fatigue since 7/9 after a wedding. 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 Avina     Serology: normal complement     Plan  hold Azathioprine for now since patient likely has infection and c/w home dose HCQ  Her lupus markers were always negative, therefore hard to access disease activity. However, creatinine function is stable. We will recheck dsDNA, GRANT, and P/Cr  Recommend expand other infection such as CMV, upper full respiratory panel   Ok to be DC once the lab is sent. We will follow with the result as outpatient  Patient will follow with Dr. Mejia as outpatient -He is aware regarding her current admission      DW Dr. David Ley MD  PGY-5 Rheumatology Fellow  Pager: [phone]   Available in teams

## 2023-07-17 NOTE — CONSULT NOTE ADULT - ASSESSMENT
Pt is a 33yo F w/ PMH of HTN, SLE, lupus nephritis Class III, CKDIII pw pancytopenia. Of note she is on Azathioprine for SLE which she has been holding for 2 days prior to admission. In the ED febrile to 100.4 w/ tachycardia to 111 and pancytopenia. CXR w/out consolidation, RVP negative and UA w/out UTI. ID is following and recommending cefepime empirically for now.     Hematology consulted for bicytopenia. 7/17 labs: Hgb 8.9 (baseline 10s), platelets 124, fibrinogen 600s, ferritin 261, iron haptoglobin < 20, , hepatitis negative, B12 and folate normal, HIV negative. Lyme negative. Patient found to be EBV positive on ). Per patient, she became anemic during pregnancy. She is now found to have hemolysis. Differential diagnosis includes viral or tickborne illness, G6PD deficiency, consumptive process by spleen (found have splenomegaly on ultrasound), hemoglobinopathy, AIHA.    PLAN:  - Please obtain iron studies (iron, TIBC, ferritin, T sat), G6PD, osmotic fragility test, cold agglutinin, electrophoresis  - Please trend hemolysis labs (d-dimer, haptoglobin, LDH, bilirubin, retic count)  - Please follow up on Cristin, babesia, parvovirus.   - Peripheral smear (7/17): clumps of RBCs, tear drop cells, bite cells, helmet cells, no schistocytes  - Transfuse for hg < 7.0 and platelets < 10k, < 20k if febrile and < 50k if bleeding    ***************************************************************  Marleni Mills, PGY4  Fellow Hematology/Oncology  pager: [phone]   ***************************************************************

## 2023-07-17 NOTE — PROGRESS NOTE ADULT - ASSESSMENT
33yo F w/ PMH of HTN, SLE, lupus nephritis Class III, CKDIII pw pancytopenia. Daron reports having recently been at a large wedding w/ over 500 people, some "sick" symptoms including her , uncle, and child. Reports fevers, chills, fatigue, non-productive cough, and sore throat, noted to have pancytopenia. She is on Azathioprine for SLE.  In the ED febrile to 100.4 w/ tachycardia to 111 neg RVP, bland UA w no hematuria    Neutropenic fever    - patient with fever and neutropenic with an ANC of 1130, pancytopenia -- note presentation suggesting viral illness   - no suggestion of PNA on imaging or exam and bland UA and no urinary symptoms and negative Ucx    - RVP, HIV, acute hepatitis panel negative    - mild transaminitis-LFTs downtrended    - differential includes infectious etiology such as viral including URI viral and others as well as bacterial process, lupus flair perhaps triggered by viral etiology    Recommendations:  - follow blood cultures - NGTD x2 (24h)  - follow tick borne work up, EBV and parvovirus B19 PCRs, Strongyloides serology   - follow up RUQ ultrasound   - continue on cefepime 1g IV Q8h pending blood cultures -- if negative x48h - can look at discontinuing   - Rheumatology evaluation for lupus flair evaluation, AZA held   - monitor temps/CBC    D/w patient; Dr. Will Cleveland M.D.  [facility], Division of Infectious Diseases  [phone]  After 5pm on weekdays and all day on weekends - please call [phone] 33yo F w/ PMH of HTN, SLE, lupus nephritis Class III, CKDIII pw pancytopenia. Daron reports having recently been at a large wedding w/ over 500 people, some "sick" symptoms including her , uncle, and child. Reports fevers, chills, fatigue, non-productive cough, and sore throat, noted to have pancytopenia. She is on Azathioprine for SLE.  In the ED febrile to 100.4 w/ tachycardia to 111 neg RVP, bland UA w no hematuria    Neutropenic fever likely due to EBV  Possible lupus flair as triggered by viral etiology    - patient with fever and neutropenic with an ANC of 1130, pancytopenia -- note presentation suggesting viral illness   - no suggestion of PNA on imaging or exam and bland UA and no urinary symptoms and negative Ucx    - RVP, HIV, acute hepatitis panel negative    - mild transaminitis-LFTs downtrended   - abdominal U/S with splenomegaly    - noted this afternoon that EBV PCR is positive -- suspect likely etiology of above     Recommendations:  - follow blood cultures - NGTD x2 (24h)  - follow tick borne work up, parvovirus B19 PCRs, Strongyloides serology    - discontinued cefepime   - supportive care for EBV, no heavy lifting or strenuous activity for at least 4 weeks   - Rheumatology evaluation for lupus flair evaluation, AZA held     -- d/w Dr. Ley this afternoon  - monitor temps/CBC    D/w patient; Dr. Will Cleveland M.D.  [facility], Division of Infectious Diseases  [phone]  After 5pm on weekdays and all day on weekends - please call [phone] 33yo F w/ PMH of HTN, SLE, lupus nephritis Class III, CKDIII pw pancytopenia. Daron reports having recently been at a large wedding w/ over 500 people, some "sick" symptoms including her , uncle, and child. Reports fevers, chills, fatigue, non-productive cough, and sore throat, noted to have pancytopenia. She is on Azathioprine for SLE.  In the ED febrile to 100.4 w/ tachycardia to 111 neg RVP, bland UA w no hematuria    Neutropenic fever likely due to EBV  Possible lupus flair as triggered by viral etiology    - patient with fever and neutropenic with an ANC of 1130, pancytopenia -- note presentation suggesting viral illness   - no suggestion of PNA on imaging or exam and bland UA and no urinary symptoms and negative Ucx    - RVP, HIV, acute hepatitis panel negative    - mild transaminitis-LFTs downtrended   - abdominal U/S with splenomegaly    - noted this afternoon that EBV PCR is positive -- suspect likely etiology of above    - lyme testing negative     Recommendations:  - follow blood cultures - NGTD x2 (24h)  - follow tick borne work up, parvovirus B19 PCRs, Strongyloides serology    - discontinued cefepime   - supportive care for EBV, no heavy lifting or strenuous activity for at least 4 weeks   - Rheumatology evaluation for lupus flair evaluation, AZA held     -- d/w Dr. Ley this afternoon  - monitor temps/CBC    D/w patient; Dr. Will Cleveland M.D.  [facility], Division of Infectious Diseases  [phone]  After 5pm on weekdays and all day on weekends - please call [phone]

## 2023-07-18 ENCOUNTER — TRANSCRIPTION ENCOUNTER (OUTPATIENT)
Age: 33
End: 2023-07-18

## 2023-07-18 VITALS
SYSTOLIC BLOOD PRESSURE: 110 MMHG | OXYGEN SATURATION: 99 % | RESPIRATION RATE: 18 BRPM | DIASTOLIC BLOOD PRESSURE: 88 MMHG | HEART RATE: 97 BPM | TEMPERATURE: 99 F

## 2023-07-18 LAB
A PHAGOCYTOPH IGG TITR SER IF: SIGNIFICANT CHANGE UP TITER
ALBUMIN SERPL ELPH-MCNC: 3.7 G/DL — SIGNIFICANT CHANGE UP (ref 3.3–5)
ALP SERPL-CCNC: 144 U/L — HIGH (ref 40–120)
ALT FLD-CCNC: 45 U/L — SIGNIFICANT CHANGE UP (ref 10–45)
ANION GAP SERPL CALC-SCNC: 14 MMOL/L — SIGNIFICANT CHANGE UP (ref 5–17)
ANISOCYTOSIS BLD QL: SLIGHT — SIGNIFICANT CHANGE UP
AST SERPL-CCNC: 47 U/L — HIGH (ref 10–40)
B BURGDOR AB SER QL IA: NEGATIVE — SIGNIFICANT CHANGE UP
B MICROTI IGG TITR SER: SIGNIFICANT CHANGE UP TITER
B19V DNA FLD QL NAA+PROBE: SIGNIFICANT CHANGE UP IU/ML
BASOPHILS # BLD AUTO: 0 K/UL — SIGNIFICANT CHANGE UP (ref 0–0.2)
BASOPHILS NFR BLD AUTO: 0 % — SIGNIFICANT CHANGE UP (ref 0–2)
BILIRUB DIRECT SERPL-MCNC: <0.1 MG/DL — SIGNIFICANT CHANGE UP (ref 0–0.3)
BILIRUB INDIRECT FLD-MCNC: >0.3 MG/DL — SIGNIFICANT CHANGE UP (ref 0.2–1)
BILIRUB SERPL-MCNC: 0.4 MG/DL — SIGNIFICANT CHANGE UP (ref 0.2–1.2)
BUN SERPL-MCNC: 22 MG/DL — SIGNIFICANT CHANGE UP (ref 7–23)
CALCIUM SERPL-MCNC: 8.8 MG/DL — SIGNIFICANT CHANGE UP (ref 8.4–10.5)
CHLORIDE SERPL-SCNC: 103 MMOL/L — SIGNIFICANT CHANGE UP (ref 96–108)
CMV DNA CSF QL NAA+PROBE: SIGNIFICANT CHANGE UP IU/ML
CMV DNA SPEC NAA+PROBE-LOG#: SIGNIFICANT CHANGE UP LOG10IU/ML
CO2 SERPL-SCNC: 20 MMOL/L — LOW (ref 22–31)
CREAT SERPL-MCNC: 1.95 MG/DL — HIGH (ref 0.5–1.3)
CRP SERPL-MCNC: 7 MG/L — HIGH (ref 0–4)
D DIMER BLD IA.RAPID-MCNC: 1103 NG/ML DDU — HIGH
DACRYOCYTES BLD QL SMEAR: SLIGHT — SIGNIFICANT CHANGE UP
DAT POLY-SP REAG RBC QL: NEGATIVE — SIGNIFICANT CHANGE UP
DSDNA AB SER-ACNC: 20 IU/ML — SIGNIFICANT CHANGE UP
E CHAFFEENSIS IGG TITR SER IF: SIGNIFICANT CHANGE UP TITER
EGFR: 35 ML/MIN/1.73M2 — LOW
EOSINOPHIL # BLD AUTO: 0.03 K/UL — SIGNIFICANT CHANGE UP (ref 0–0.5)
EOSINOPHIL NFR BLD AUTO: 0.9 % — SIGNIFICANT CHANGE UP (ref 0–6)
ERYTHROCYTE [SEDIMENTATION RATE] IN BLOOD: 43 MM/HR — HIGH (ref 0–15)
FERRITIN SERPL-MCNC: 334 NG/ML — HIGH (ref 15–150)
FIBRINOGEN PPP-MCNC: 346 MG/DL — SIGNIFICANT CHANGE UP (ref 200–445)
GLUCOSE SERPL-MCNC: 92 MG/DL — SIGNIFICANT CHANGE UP (ref 70–99)
HAPTOGLOB SERPL-MCNC: <20 MG/DL — LOW (ref 34–200)
HCT VFR BLD CALC: 28.1 % — LOW (ref 34.5–45)
HGB BLD-MCNC: 9.1 G/DL — LOW (ref 11.5–15.5)
IRON SATN MFR SERPL: 65 UG/DL — SIGNIFICANT CHANGE UP (ref 30–160)
LDH SERPL L TO P-CCNC: 578 U/L — HIGH (ref 50–242)
LYMPHOCYTES # BLD AUTO: 1.27 K/UL — SIGNIFICANT CHANGE UP (ref 1–3.3)
LYMPHOCYTES # BLD AUTO: 39.8 % — SIGNIFICANT CHANGE UP (ref 13–44)
MACROCYTES BLD QL: SLIGHT — SIGNIFICANT CHANGE UP
MANUAL SMEAR VERIFICATION: SIGNIFICANT CHANGE UP
MCHC RBC-ENTMCNC: 29.7 PG — SIGNIFICANT CHANGE UP (ref 27–34)
MCHC RBC-ENTMCNC: 32.4 GM/DL — SIGNIFICANT CHANGE UP (ref 32–36)
MCV RBC AUTO: 91.8 FL — SIGNIFICANT CHANGE UP (ref 80–100)
METAMYELOCYTES # FLD: 1.9 % — HIGH (ref 0–0)
MICROCYTES BLD QL: SLIGHT — SIGNIFICANT CHANGE UP
MONOCYTES # BLD AUTO: 0.24 K/UL — SIGNIFICANT CHANGE UP (ref 0–0.9)
MONOCYTES NFR BLD AUTO: 7.4 % — SIGNIFICANT CHANGE UP (ref 2–14)
MRSA PCR RESULT.: SIGNIFICANT CHANGE UP
NEUTROPHILS # BLD AUTO: 1.57 K/UL — LOW (ref 1.8–7.4)
NEUTROPHILS NFR BLD AUTO: 47.2 % — SIGNIFICANT CHANGE UP (ref 43–77)
NEUTS BAND # BLD: 1.9 % — SIGNIFICANT CHANGE UP (ref 0–8)
OVALOCYTES BLD QL SMEAR: SLIGHT — SIGNIFICANT CHANGE UP
PLAT MORPH BLD: NORMAL — SIGNIFICANT CHANGE UP
PLATELET # BLD AUTO: 142 K/UL — LOW (ref 150–400)
POIKILOCYTOSIS BLD QL AUTO: SLIGHT — SIGNIFICANT CHANGE UP
POLYCHROMASIA BLD QL SMEAR: SLIGHT — SIGNIFICANT CHANGE UP
POTASSIUM SERPL-MCNC: 5.2 MMOL/L — SIGNIFICANT CHANGE UP (ref 3.5–5.3)
POTASSIUM SERPL-SCNC: 5.2 MMOL/L — SIGNIFICANT CHANGE UP (ref 3.5–5.3)
PROCALCITONIN SERPL-MCNC: 0.44 NG/ML — HIGH (ref 0.02–0.1)
PROT SERPL-MCNC: 6.7 G/DL — SIGNIFICANT CHANGE UP (ref 6–8.3)
PROT SERPL-MCNC: 6.7 G/DL — SIGNIFICANT CHANGE UP (ref 6–8.3)
PROT SERPL-MCNC: 7.3 G/DL — SIGNIFICANT CHANGE UP (ref 6–8.3)
RBC # BLD: 3.06 M/UL — LOW (ref 3.8–5.2)
RBC # BLD: 3.06 M/UL — LOW (ref 3.8–5.2)
RBC # FLD: 15.8 % — HIGH (ref 10.3–14.5)
RBC BLD AUTO: ABNORMAL
RETICS #: 125.8 K/UL — HIGH (ref 25–125)
RETICS/RBC NFR: 4.1 % — HIGH (ref 0.5–2.5)
S AUREUS DNA NOSE QL NAA+PROBE: DETECTED
S PYO AG SPEC QL IA: POSITIVE
SMUDGE CELLS # BLD: PRESENT — SIGNIFICANT CHANGE UP
SODIUM SERPL-SCNC: 137 MMOL/L — SIGNIFICANT CHANGE UP (ref 135–145)
VARIANT LYMPHS # BLD: 0.9 % — SIGNIFICANT CHANGE UP (ref 0–6)
WBC # BLD: 3.19 K/UL — LOW (ref 3.8–10.5)
WBC # FLD AUTO: 3.19 K/UL — LOW (ref 3.8–10.5)

## 2023-07-18 PROCEDURE — 85652 RBC SED RATE AUTOMATED: CPT

## 2023-07-18 PROCEDURE — 85025 COMPLETE CBC W/AUTO DIFF WBC: CPT

## 2023-07-18 PROCEDURE — 82728 ASSAY OF FERRITIN: CPT

## 2023-07-18 PROCEDURE — 83010 ASSAY OF HAPTOGLOBIN QUANT: CPT

## 2023-07-18 PROCEDURE — 83615 LACTATE (LD) (LDH) ENZYME: CPT

## 2023-07-18 PROCEDURE — 36415 COLL VENOUS BLD VENIPUNCTURE: CPT

## 2023-07-18 PROCEDURE — 84165 PROTEIN E-PHORESIS SERUM: CPT

## 2023-07-18 PROCEDURE — 96374 THER/PROPH/DIAG INJ IV PUSH: CPT

## 2023-07-18 PROCEDURE — 87799 DETECT AGENT NOS DNA QUANT: CPT

## 2023-07-18 PROCEDURE — 86666 EHRLICHIA ANTIBODY: CPT

## 2023-07-18 PROCEDURE — 86140 C-REACTIVE PROTEIN: CPT

## 2023-07-18 PROCEDURE — 84156 ASSAY OF PROTEIN URINE: CPT

## 2023-07-18 PROCEDURE — 87389 HIV-1 AG W/HIV-1&-2 AB AG IA: CPT

## 2023-07-18 PROCEDURE — 87040 BLOOD CULTURE FOR BACTERIA: CPT

## 2023-07-18 PROCEDURE — 82330 ASSAY OF CALCIUM: CPT

## 2023-07-18 PROCEDURE — 0225U NFCT DS DNA&RNA 21 SARSCOV2: CPT

## 2023-07-18 PROCEDURE — 82803 BLOOD GASES ANY COMBINATION: CPT

## 2023-07-18 PROCEDURE — 86618 LYME DISEASE ANTIBODY: CPT

## 2023-07-18 PROCEDURE — 82570 ASSAY OF URINE CREATININE: CPT

## 2023-07-18 PROCEDURE — 85045 AUTOMATED RETICULOCYTE COUNT: CPT

## 2023-07-18 PROCEDURE — 86157 COLD AGGLUTININ TITER: CPT

## 2023-07-18 PROCEDURE — 84132 ASSAY OF SERUM POTASSIUM: CPT

## 2023-07-18 PROCEDURE — 99285 EMERGENCY DEPT VISIT HI MDM: CPT | Mod: 25

## 2023-07-18 PROCEDURE — 87880 STREP A ASSAY W/OPTIC: CPT

## 2023-07-18 PROCEDURE — 86753 PROTOZOA ANTIBODY NOS: CPT

## 2023-07-18 PROCEDURE — 85027 COMPLETE CBC AUTOMATED: CPT

## 2023-07-18 PROCEDURE — 84100 ASSAY OF PHOSPHORUS: CPT

## 2023-07-18 PROCEDURE — 85384 FIBRINOGEN ACTIVITY: CPT

## 2023-07-18 PROCEDURE — 86901 BLOOD TYPING SEROLOGIC RH(D): CPT

## 2023-07-18 PROCEDURE — 82435 ASSAY OF BLOOD CHLORIDE: CPT

## 2023-07-18 PROCEDURE — 82397 CHEMILUMINESCENT ASSAY: CPT

## 2023-07-18 PROCEDURE — 87640 STAPH A DNA AMP PROBE: CPT

## 2023-07-18 PROCEDURE — 85379 FIBRIN DEGRADATION QUANT: CPT

## 2023-07-18 PROCEDURE — 83735 ASSAY OF MAGNESIUM: CPT

## 2023-07-18 PROCEDURE — 76700 US EXAM ABDOM COMPLETE: CPT

## 2023-07-18 PROCEDURE — 82947 ASSAY GLUCOSE BLOOD QUANT: CPT

## 2023-07-18 PROCEDURE — 86334 IMMUNOFIX E-PHORESIS SERUM: CPT

## 2023-07-18 PROCEDURE — 80074 ACUTE HEPATITIS PANEL: CPT

## 2023-07-18 PROCEDURE — 85730 THROMBOPLASTIN TIME PARTIAL: CPT

## 2023-07-18 PROCEDURE — 82746 ASSAY OF FOLIC ACID SERUM: CPT

## 2023-07-18 PROCEDURE — 83540 ASSAY OF IRON: CPT

## 2023-07-18 PROCEDURE — 81001 URINALYSIS AUTO W/SCOPE: CPT

## 2023-07-18 PROCEDURE — 86900 BLOOD TYPING SEROLOGIC ABO: CPT

## 2023-07-18 PROCEDURE — 86160 COMPLEMENT ANTIGEN: CPT

## 2023-07-18 PROCEDURE — 80053 COMPREHEN METABOLIC PANEL: CPT

## 2023-07-18 PROCEDURE — 85014 HEMATOCRIT: CPT

## 2023-07-18 PROCEDURE — 83529 ASAY OF INTERLEUKIN-6 (IL-6): CPT

## 2023-07-18 PROCEDURE — 87641 MR-STAPH DNA AMP PROBE: CPT

## 2023-07-18 PROCEDURE — 86880 COOMBS TEST DIRECT: CPT

## 2023-07-18 PROCEDURE — 84295 ASSAY OF SERUM SODIUM: CPT

## 2023-07-18 PROCEDURE — 86850 RBC ANTIBODY SCREEN: CPT

## 2023-07-18 PROCEDURE — 99239 HOSP IP/OBS DSCHRG MGMT >30: CPT

## 2023-07-18 PROCEDURE — 86682 HELMINTH ANTIBODY: CPT

## 2023-07-18 PROCEDURE — 82607 VITAMIN B-12: CPT

## 2023-07-18 PROCEDURE — 86225 DNA ANTIBODY NATIVE: CPT

## 2023-07-18 PROCEDURE — 82955 ASSAY OF G6PD ENZYME: CPT

## 2023-07-18 PROCEDURE — 85555 RBC OSMOTIC FRAGILITY: CPT

## 2023-07-18 PROCEDURE — 85018 HEMOGLOBIN: CPT

## 2023-07-18 PROCEDURE — 80061 LIPID PANEL: CPT

## 2023-07-18 PROCEDURE — 80076 HEPATIC FUNCTION PANEL: CPT

## 2023-07-18 PROCEDURE — 84155 ASSAY OF PROTEIN SERUM: CPT

## 2023-07-18 PROCEDURE — 83520 IMMUNOASSAY QUANT NOS NONAB: CPT

## 2023-07-18 PROCEDURE — 71045 X-RAY EXAM CHEST 1 VIEW: CPT

## 2023-07-18 PROCEDURE — 85610 PROTHROMBIN TIME: CPT

## 2023-07-18 PROCEDURE — 87086 URINE CULTURE/COLONY COUNT: CPT

## 2023-07-18 PROCEDURE — 83605 ASSAY OF LACTIC ACID: CPT

## 2023-07-18 PROCEDURE — 84145 PROCALCITONIN (PCT): CPT

## 2023-07-18 PROCEDURE — 80048 BASIC METABOLIC PNL TOTAL CA: CPT

## 2023-07-18 RX ORDER — AMOXICILLIN 250 MG/5ML
1 SUSPENSION, RECONSTITUTED, ORAL (ML) ORAL
Qty: 20 | Refills: 0
Start: 2023-07-18 | End: 2023-07-27

## 2023-07-18 RX ADMIN — FAMOTIDINE 20 MILLIGRAM(S): 10 INJECTION INTRAVENOUS at 13:34

## 2023-07-18 RX ADMIN — CHLORHEXIDINE GLUCONATE 1 APPLICATION(S): 213 SOLUTION TOPICAL at 13:35

## 2023-07-18 RX ADMIN — AMLODIPINE BESYLATE 10 MILLIGRAM(S): 2.5 TABLET ORAL at 05:53

## 2023-07-18 RX ADMIN — Medication 200 MILLIGRAM(S): at 13:34

## 2023-07-18 NOTE — DISCHARGE NOTE PROVIDER - NSDCFUADDAPPT_GEN_ALL_CORE_FT
APPTS ARE READY TO BE MADE: [X] YES    Best Family or Patient Contact (if needed):    Additional Information about above appointments (if needed):    1: Hematology  2: Rheumatology  3: Nephrology  4: ID   5: PCP (repeat lab work in 1 week)     Other comments or requests:    APPTS ARE READY TO BE MADE: [X] YES    Best Family or Patient Contact (if needed):    Additional Information about above appointments (if needed):    1: Hematology 2 weeks  2: Rheumatology 1 week  3: Nephrology 2 weeks  4: ID 2 weeks  5: PCP (repeat lab work in 1 week)     Other comments or requests:    APPTS ARE READY TO BE MADE: [X] YES    Best Family or Patient Contact (if needed):    Additional Information about above appointments (if needed):    1: Hematology 2 weeks  2: Rheumatology 1 week  3: Nephrology 2 weeks  4: ID 2 weeks  5: PCP (repeat lab work in 1 week)     Other comments or requests:     Patient was previously scheduled to see Dr. Wilson at 2:00PM on 8/9/23 at [address] [place] [address], [address] [place] [id number] APPTS ARE READY TO BE MADE: [X] YES    Best Family or Patient Contact (if needed):    Additional Information about above appointments (if needed):    1: Hematology 2 weeks  2: Rheumatology 1 week  3: Nephrology 2 weeks  4: ID 2 weeks  5: PCP (repeat lab work in 1 week)     Other comments or requests:     Patient was previously scheduled to see Dr. Wilson at 2:00PM on 8/9/23 at [address] [place] [address], [address] [place] [address]  Patient was scheduled for an appointment on  7/25 3:20pm at [address] [place] [address] with Dr. Mejia. Patient/Caregiver was advised of appointment details.    Providers Dr. Simms and Dr. Mata office was contacted to secure an appointment, however the office will follow up with the patient/caregiver directly.   Patient/Caregiver was provided with follow up request details and prefers to call the providers office on their own to schedule.

## 2023-07-18 NOTE — DISCHARGE NOTE PROVIDER - NPI NUMBER (FOR SYSADMIN USE ONLY) :
[[id number]] [[id number]],[[id number]]

## 2023-07-18 NOTE — PROGRESS NOTE ADULT - ASSESSMENT
31yo F w/ PMH of HTN, SLE, lupus nephritis Class III, CKDIII pw pancytopenia. Daron reports having recently been at a large wedding w/ over 500 people, some "sick" symptoms including her , uncle, and child. Reports fevers, chills, fatigue, non-productive cough, and sore throat, noted to have pancytopenia. She is on Azathioprine for SLE.  In the ED febrile to 100.4 w/ tachycardia to 111 neg RVP, bland UA w no hematuria    Neutropenic fever likely due to EBV  Possible lupus flair as triggered by viral etiology    - patient with fever and neutropenic with an ANC of 1130, pancytopenia -- note presentation suggesting viral illness and now noted with +EBV PCR    - no suggestion of PNA on imaging or exam and bland UA and no urinary symptoms and negative Ucx    - RVP, HIV, acute hepatitis panel negative    - mild transaminitis-LFTs downtrending   - abdominal U/S with splenomegaly    - lyme testing negative    - Bcx NGTD x2 (48h)   - afebrile x24h, WBC trending up   - s/p cefepime     Recommendations:  - follow tick borne work up, parvovirus B19 PCRs, Strongyloides serology    - continue to observe off antibiotics    - supportive care for EBV, no heavy lifting or strenuous activity for at least 4 weeks   - Rheumatology following, work up in process   - monitor temps/CBC  - patient can follow up with me as outpatient in 1-2 weeks     Stable from ID standpoint at this time   Discharge planning per primary team     Sita Cleveland M.D.  [facility], Division of Infectious Diseases  [phone]  After 5pm on weekdays and all day on weekends - please call [phone]

## 2023-07-18 NOTE — DISCHARGE NOTE PROVIDER - NSDCFUSCHEDAPPT_GEN_ALL_CORE_FT
Sarah Wilson  [facility]  NEPHRO [address]  Scheduled Appointment: 08/09/2023     Uri Mejia  [facility]  RHEUM [address]  Scheduled Appointment: 07/25/2023    Sarah Wilson  [facility]  [address]PHRO [address]  Scheduled Appointment: 08/09/2023    Sugar Mata  [facility]  INFDISEASE [address]  Scheduled Appointment: 09/25/2023    Corina Simms  [facility]  INTMED [address] [facility]  Scheduled Appointment: 09/27/2023

## 2023-07-18 NOTE — DISCHARGE NOTE NURSING/CASE MANAGEMENT/SOCIAL WORK - NSDCFUADDAPPT_GEN_ALL_CORE_FT
APPTS ARE READY TO BE MADE: [X] YES    Best Family or Patient Contact (if needed):    Additional Information about above appointments (if needed):    1: Hematology 2 weeks  2: Rheumatology 1 week  3: Nephrology 2 weeks  4: ID 2 weeks  5: PCP (repeat lab work in 1 week)     Other comments or requests:

## 2023-07-18 NOTE — CHART NOTE - NSCHARTNOTEFT_GEN_A_CORE
Patient discharged today. Strep Group A culture (throat) resulted positive. Discussed with attending Dr. Solis, Rx for Amoxicillin 500mg BID x 10 days sent to patients preferred pharmacy. ID Dr. Cleveland made aware via TEAMS. Attempted to call patient's personal number on file, inactive. Spoke with patient's  Mr. Mazariegos who is aware to obtain prescription and patient to promptly follow up with BILLY Cleveland outpatient for continued care.       Jo-Ann Tijerina PA-C Patient discharged today. Strep Group A culture (throat) resulted positive. Discussed with attending Dr. Solis, was advised to send Rx for Amoxicillin 500mg BID x 10 days. Rx sent to patients preferred pharmacy. ID Dr. Cleveland made aware via TEAMS. Attempted to call patient's personal number on file, inactive. Spoke with patient's  Mr. Mazariegos who is aware to obtain prescription and patient to promptly follow up with BILLY Cleveland outpatient for continued care.       Jo-Ann Tijerina PA-C

## 2023-07-18 NOTE — PROGRESS NOTE ADULT - SUBJECTIVE AND OBJECTIVE BOX
[facility] DIVISION OF INFECTIOUS DISEASES  [facility] Marley Y. Patel, S. Shah, G. [place]otah  [phone]  ([phone] - weekdays after 5pm and weekends)    Name: PIA PAZ  Age/Gender: 32y Female  MRN: [id number]    Interval History:  Patient seen and examined this morning.   Feels fatigued, sore throat slightly less.   No new complaints  Notes reviewed.   No concerning overnight events.  Afebrile.   Allergies: No Known Allergies      Objective:  Vitals:   T(F): 99.2 (23 @ 06:13), Max: 99.2 (23 @ 23:36)  HR: 94 (23 @ 06:13) (87 - 105)  BP: 108/76 (23 @ 06:13) (108/75 - 128/91)  RR: 18 (23 @ 06:13) (18 - 19)  SpO2: 99% (23 @ 06:13) (95% - 100%)  Physical Examination:  General: no acute distress  HEENT: NC/AT, EOMI, anicteric, neck supple  Cardio: S1, S2 present, normal rate, no murmur  Resp: clear to auscultation bilaterally  Abd: soft, NT, ND, + BS  Neuro: AAOx3, no obvious focal deficits  Ext: no edema, no cyanosis, moving extremities  Skin: warm, dry, no visible rash  Psych: appropriate affect and mood for situation  Lines: PIV    Laboratory Studies:  CBC:                       9.1    3.19  )-----------( 142      ( 2023 07:03 )             28.1     WBC Trend:  3.19 23 @ 07:03  2.36 23 @ 06:25  2.04 23 @ 05:23  2.15 07-15-23 @ 13:56    CMP:     137  |  103  |  22  ----------------------------<  92  5.2   |  20<L>  |  1.95<H>    Ca    8.8      2023 07:02    TPro  6.7  /  Alb  x   /  TBili  x   /  DBili  x   /  AST  x   /  ALT  x   /  AlkPhos  x       Creatinine: 1.95 mg/dL (23 @ 07:02)  Creatinine: 1.82 mg/dL (23 @ 06:27)  Creatinine: 1.83 mg/dL (23 @ 05:23)  Creatinine: 1.97 mg/dL (07-15-23 @ 13:56)      LIVER FUNCTIONS - ( 2023 07:04 )  Alb: x     / Pro: 6.7 g/dL / ALK PHOS: x     / ALT: x     / AST: x     / GGT: x           Microbiology: reviewed   Tony Barr Virus DNA by PCR - Blood (23 @ 05:23)    EBVPCR Lo.97: Assay Dynamic Range: 35 to 1.0E+08 IU/mL (1.54 to 8.00 Log10 IU/mL)  Assay lower limit of quantification (LLOQ) is 35 IU/mL (1.54 Log10 IU/mL)  EBV DNA detected below the LLOQ will be reported as Detected < 35 IU/mL  (<1.54 Log10 IU/mL)  Renate Tony-Barr Virus (EBV) is an FDA-cleared quantitative test that  enables the detection and quantitation of EBV DNA in EDTA plasma of  infected transplant patients on the renate 8800 system. This test was  verified by [facility]. Results should be interpreted  with consideration of all clinical findings and laboratory findings and  should not form the sole basis for a diagnosis or treatment decision. Ofg47AQ/mL      Culture - Urine (collected 07-15-23 @ 13:57)  Source: Clean Catch Clean Catch (Midstream)  Final Report (23 @ 19:43):    <10,000 CFU/mL Normal Urogenital Nat    Culture - Blood (collected 07-15-23 @ 13:50)  Source: .Blood Blood-Peripheral  Preliminary Report (23 @ 20:01):    No growth at 48 Hours    Culture - Blood (collected 07-15-23 @ 13:35)  Source: .Blood Blood-Peripheral  Preliminary Report (23 @ 20:01):    No growth at 48 Hours    Radiology: reviewed     Medications:  acetaminophen     Tablet .. 650 milliGRAM(s) Oral every 6 hours PRN  amLODIPine   Tablet 10 milliGRAM(s) Oral daily  chlorhexidine 2% Cloths 1 Application(s) Topical daily  famotidine    Tablet 20 milliGRAM(s) Oral daily  hydroxychloroquine 200 milliGRAM(s) Oral daily    Current Antimicrobials:  hydroxychloroquine 200 milliGRAM(s) Oral daily    Prior/Completed Antimicrobials:  cefepime   IVPB

## 2023-07-18 NOTE — DISCHARGE NOTE PROVIDER - HOSPITAL COURSE
33yo F w/ PMH of HTN, SLE, lupus nephritis Class III, CKDIII pw F/C, fatigue, sore throat and non-productive cough, meeting sepsis criteria w/ fever, HR and WBC count c/b pancytopenia and elevated liver enzymes c/f viral infection vs medication adverse effect vs SLE flare        Problem/Plan - 1:  ·  Problem: Pancytopenia with fever.   ·  Plan: CXR unremarkable, UA negative. BCX NGTD. RVP, HIV, acute hepatitis panel negative  Neutropenic fever likely due to EBV   -ID following   -Stop cefepime   -supportive care for EBV  -Mild transaminitis - improving. RUQ US reviewed.     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 Rec warner studies (iron, TIBC, ferritin, T sat), G6PD, osmotic fragility test, cold agglutinin, electrophoresis.     Problem/Plan - 2:  ·  Problem: Sepsis due to undetermined organism.   ·  Plan: likely due to EBV. Supportive management. See above.     Problem/Plan - 3:  ·  Problem: SLE (systemic lupus erythematosus).   ·  Plan: - Hold Azathioprine for now given pancytopenia and fevers  - c/w hydroxychloroquine   - seen by rheum - recheck dsDNA, GRANT, and P/Cr. Will f/u outpatient.     Problem/Plan - 4:  ·  Problem: Lupus nephritis.   ·  Plan: - Renal outpt made aware of admission  - Cr appears at baseline since recent pregnancy (6 months ago).     Problem/Plan - 5:  ·  Problem: HTN (hypertension).   ·  Plan: - c/w Amlodipine 10mg qd w/ hold parameters. 33yo F w/ PMH of HTN, SLE, lupus nephritis Class III, CKDIII pw F/C, fatigue, sore throat and non-productive cough, meeting sepsis criteria w/ fever, HR and WBC count c/b pancytopenia and elevated liver enzymes c/f viral infection vs medication adverse effect vs SLE flare        Problem/Plan - 1:  ·  Problem: Pancytopenia with fever.   ·  Plan: CXR unremarkable, UA negative. BCX NGTD. RVP, HIV, acute hepatitis panel negative  Neutropenic fever likely due to EBV   -ID following   -Stop cefepime   -supportive care for EBV  -Mild transaminitis - improving. RUQ US reviewed.     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 Rec warner studies (iron, TIBC, ferritin, T sat), G6PD, osmotic fragility test, cold agglutinin, electrophoresis.     Problem/Plan - 2:  ·  Problem: Sepsis due to undetermined organism.   ·  Plan: likely due to EBV. Supportive management. See above.     Problem/Plan - 3:  ·  Problem: SLE (systemic lupus erythematosus).   ·  Plan: - Hold Azathioprine for now given pancytopenia and fevers  - c/w hydroxychloroquine   - seen by rheum - recheck dsDNA, GRANT, and P/Cr. Will f/u outpatient.     Problem/Plan - 4:  ·  Problem: Lupus nephritis.   ·  Plan: - Renal outpt made aware of admission  - Cr appears at baseline since recent pregnancy (6 months ago).     Problem/Plan - 5:  ·  Problem: HTN (hypertension).   ·  Plan: - c/w Amlodipine 10mg qd w/ hold parameters.    Per attending, D/w renal, ID, and rheum - ok for d/c w/ close outpatient f/u. Renal to arrange for renal biopsy outpatient. D/w heme labs drawn and pending in lab. med cleared by heme/onc for discharge to follow up outpatient to review lab results and determination of treatment plan and continued management.    Discharge/dispo/med rec discussed with Dr. Solis who determined patient stable and medically cleared for discharge home with prompt outpatient follow up rheumatology/nephrology/hematology/ID and PCP. 33yo F w/ PMH of HTN, SLE, lupus nephritis Class III, CKDIII pw F/C, fatigue, sore throat and non-productive cough, meeting sepsis criteria w/ fever, HR and WBC count c/b pancytopenia and elevated liver enzymes c/f viral infection vs medication adverse effect vs SLE flare        Problem/Plan - 1:  ·  Problem: Pancytopenia with fever.   ·  Plan: CXR unremarkable, UA negative. BCX NGTD. RVP, HIV, acute hepatitis panel negative  fever likely due to EBV   -ID following   -Stop cefepime   -supportive care for EBV  -Mild transaminitis - improving. RUQ US reviewed.     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 Rec warner studies (iron, TIBC, ferritin, T sat), G6PD, osmotic fragility test, cold agglutinin, electrophoresis.     Problem/Plan - 2:  ·  Problem: Sepsis due to undetermined organism.   ·  Plan: likely due to EBV. Supportive management. See above.     Problem/Plan - 3:  ·  Problem: SLE (systemic lupus erythematosus).   ·  Plan: - Hold Azathioprine for now given pancytopenia and fevers  - c/w hydroxychloroquine   - seen by rheum - recheck dsDNA, GRANT, and P/Cr. Will f/u outpatient.     Problem/Plan - 4:  ·  Problem: Lupus nephritis.   ·  Plan: - Renal outpt made aware of admission  - Cr appears at baseline since recent pregnancy (6 months ago).     Problem/Plan - 5:  ·  Problem: HTN (hypertension).   ·  Plan: - c/w Amlodipine 10mg qd w/ hold parameters.      D/w ID - f/u outpatient. Supportive care for EBV   D/w renal - will arrange for outpatient renal bx. Cr stable   D/w rhuem - hold azathioprine. F/u outpt one week to discuss resuming and f/u pending labs   D/w heme - heme w/u as above pending. H/H, plt stable. F/u outpt w/ heme team   F/u PCP 1 week for repeat labs

## 2023-07-18 NOTE — DISCHARGE NOTE PROVIDER - ATTENDING DISCHARGE PHYSICAL EXAMINATION:
Seen and examined at bedside. NAC. Denies CP, SOB, n/v, abd pain. Feels well; anxious to return home.   General: NAD  HEENT: NC/AT; EOMI; MMM  Cards: RRR, S1/S2  Resp: CTA b/l  Abd: soft, NT/ND   Extremities: moves all extremities  Neuro: grossly nonfocal; A&Ox3

## 2023-07-18 NOTE — DISCHARGE NOTE PROVIDER - PROVIDER TOKENS
PROVIDER:[TOKEN:[[id number]],FOLLOWUP:[1 week]],PROVIDER:[TOKEN:[[id number]],FOLLOWUP:[1 week]],PROVIDER:[TOKEN:[[id number]],FOLLOWUP:[1 week]] PROVIDER:[TOKEN:[[id number]],FOLLOWUP:[1 week]],PROVIDER:[TOKEN:[[id number]],FOLLOWUP:[1 week]],PROVIDER:[TOKEN:[[id number]],FOLLOWUP:[1 week]],PROVIDER:[TOKEN:[[id number]],FOLLOWUP:[1 week]],PROVIDER:[TOKEN:[[id number]],FOLLOWUP:[1 week]]

## 2023-07-18 NOTE — DISCHARGE NOTE PROVIDER - NSDCMRMEDTOKEN_GEN_ALL_CORE_FT
amLODIPine 10 mg oral tablet: 1 tab(s) orally once a day  famotidine 20 mg oral tablet: 1 tab(s) orally once a day  hydroxychloroquine 200 mg oral tablet: 1 tab(s) orally once a day   amLODIPine 10 mg oral tablet: 1 tab(s) orally once a day  amoxicillin 500 mg oral tablet: 1 tab(s) orally 2 times a day  famotidine 20 mg oral tablet: 1 tab(s) orally once a day  hydroxychloroquine 200 mg oral tablet: 1 tab(s) orally once a day

## 2023-07-18 NOTE — DISCHARGE NOTE NURSING/CASE MANAGEMENT/SOCIAL WORK - PATIENT PORTAL LINK FT
You can access the FollowMyHealth Patient Portal offered by [facility] by registering at the following website: [email or web address]. By joining [employer]’s FollowMyHealth portal, you will also be able to view your health information using other applications (apps) compatible with our system.

## 2023-07-18 NOTE — DISCHARGE NOTE NURSING/CASE MANAGEMENT/SOCIAL WORK - NSDCPEFALRISK_GEN_ALL_CORE
For information on Fall & Injury Prevention, visit: [email or web address] OR  [email or web address] OR  https://www.cdc.gov/steadi/patient.html

## 2023-07-18 NOTE — DISCHARGE NOTE PROVIDER - NSDCCPCAREPLAN_GEN_ALL_CORE_FT
PRINCIPAL DISCHARGE DIAGNOSIS  Diagnosis: Pancytopenia with fever  Assessment and Plan of Treatment: - CXR unremarkable, UA negative. BCX NGTD. RVP, HIV, acute hepatitis panel negative  - Neutropenic fever likely due to EBV   - Now afebrile  - Please maintain close follow up with PCP, ID and hematology for continued management and care      SECONDARY DISCHARGE DIAGNOSES  Diagnosis: SLE (systemic lupus erythematosus)  Assessment and Plan of Treatment: You have a history of SLE. Please maintain close follow up with rheumatology and PCP for continued management and care.  HOLD Azathioprine on discharge until prompt follow up with hematology / rheumatology in 1 week to determine resumption of medication    Diagnosis: Lupus nephritis  Assessment and Plan of Treatment: Please maintain close follow up with nephrology Dr. Black for outpatient renal biopsy and continued management and care.    Diagnosis: EBV infection  Assessment and Plan of Treatment: - The treatment for EBV is supportive care, fever control and good hydration/nutrition  - No heavy lifting or strenuous activity for at least 4 weeks   - Follow up with ID for continued care    Diagnosis: HTN (hypertension)  Assessment and Plan of Treatment: Low salt diet  Activity as tolerated.  Take all medication as prescribed.  Follow up with your medical doctor for routine blood pressure monitoring at your next visit.  Notify your doctor if you have any of the following symptoms:   Dizziness, Lightheadedness, Blurry vision, Headache, Chest pain, Shortness of breath

## 2023-07-18 NOTE — DISCHARGE NOTE PROVIDER - CARE PROVIDER_API CALL
Uri Mejia  Rheumatology  [address], [address]  [place] [address]  Phone: [phone]  Fax: [phone]  Follow Up Time: 1 week    Sita Cleveland.  Infectious Disease  [address], [address]  [place] [address]  Phone: [phone]  Fax: [phone]  Follow Up Time: 1 week    Gen Hinojosa  Nephrology  [address] [place] [address], [address] [place] [address]  Phone: [phone]  Fax: [phone]  Follow Up Time: 1 week   Uri Mejia  Rheumatology  [address], [address]  [place] [address]  Phone: [phone]  Fax: [phone]  Follow Up Time: 1 week    Sita Cleveland.  Infectious Disease  [address], [address]  [place] [address]  Phone: [phone]  Fax: [phone]  Follow Up Time: 1 week    Gen Hinojosa  Nephrology  [address] [place] [address], [address]nd [place] [address]  Phone: [phone]  Fax: [phone]  Follow Up Time: 1 week    Corina Simms  Internal Medicine  [address], [address]  [place] [address]  Phone: [phone]  Fax: [phone]  Follow Up Time: 1 week    Troy Gomez  Internal Medicine  [address] [place] [address]  Phone: [phone]  Fax: [phone]  Follow Up Time: 1 week

## 2023-07-19 ENCOUNTER — NON-APPOINTMENT (OUTPATIENT)
Age: 33
End: 2023-07-19

## 2023-07-19 ENCOUNTER — TRANSCRIPTION ENCOUNTER (OUTPATIENT)
Age: 33
End: 2023-07-19

## 2023-07-19 LAB
% ALBUMIN: 51.1 % — SIGNIFICANT CHANGE UP
% ALPHA 1: 7.2 % — SIGNIFICANT CHANGE UP
% ALPHA 2: 8.1 % — SIGNIFICANT CHANGE UP
% BETA: 12.4 % — SIGNIFICANT CHANGE UP
% GAMMA: 21.2 % — SIGNIFICANT CHANGE UP
ALBUMIN SERPL ELPH-MCNC: 3.4 G/DL — LOW (ref 3.6–5.5)
ALBUMIN/GLOB SERPL ELPH: 1 RATIO — SIGNIFICANT CHANGE UP
ALPHA1 GLOB SERPL ELPH-MCNC: 0.5 G/DL — HIGH (ref 0.1–0.4)
ALPHA2 GLOB SERPL ELPH-MCNC: 0.5 G/DL — SIGNIFICANT CHANGE UP (ref 0.5–1)
B-GLOBULIN SERPL ELPH-MCNC: 0.8 G/DL — SIGNIFICANT CHANGE UP (ref 0.5–1)
CA TITR SERPL: ABNORMAL
DSDNA AB SER-ACNC: 13 IU/ML — SIGNIFICANT CHANGE UP
GAMMA GLOBULIN: 1.4 G/DL — SIGNIFICANT CHANGE UP (ref 0.6–1.6)
INTERPRETATION SERPL IFE-IMP: SIGNIFICANT CHANGE UP
PROT PATTERN SERPL ELPH-IMP: SIGNIFICANT CHANGE UP
STRONGYLOIDES AB SER-ACNC: NEGATIVE — SIGNIFICANT CHANGE UP

## 2023-07-20 PROBLEM — M32.9 SYSTEMIC LUPUS ERYTHEMATOSUS, UNSPECIFIED: Chronic | Status: ACTIVE | Noted: 2023-07-15

## 2023-07-20 PROBLEM — I10 ESSENTIAL (PRIMARY) HYPERTENSION: Chronic | Status: ACTIVE | Noted: 2023-07-15

## 2023-07-20 PROBLEM — N18.30 CHRONIC KIDNEY DISEASE, STAGE 3 UNSPECIFIED: Chronic | Status: ACTIVE | Noted: 2023-07-15

## 2023-07-20 LAB
CULTURE RESULTS: SIGNIFICANT CHANGE UP
CULTURE RESULTS: SIGNIFICANT CHANGE UP
IL6 FLD-MCNC: 8.2 PG/ML — SIGNIFICANT CHANGE UP (ref 0–13)
INTERFERON GAMMA, BLOOD: 33.9 PG/ML — HIGH
OF RESULTS REVIEWED: NORMAL — SIGNIFICANT CHANGE UP
SPECIMEN SOURCE: SIGNIFICANT CHANGE UP
SPECIMEN SOURCE: SIGNIFICANT CHANGE UP

## 2023-07-23 LAB — G6PD RBC-CCNC: 17.3 U/G HGB — SIGNIFICANT CHANGE UP (ref 7–20.5)

## 2023-07-25 ENCOUNTER — TRANSCRIPTION ENCOUNTER (OUTPATIENT)
Age: 33
End: 2023-07-25

## 2023-07-25 LAB — IL2 FLD-MCNC: <31.2 PG/ML — SIGNIFICANT CHANGE UP (ref 0–31.2)

## 2023-07-27 ENCOUNTER — LABORATORY RESULT (OUTPATIENT)
Age: 33
End: 2023-07-27

## 2023-07-27 LAB
APPEARANCE: CLEAR
BACTERIA: NEGATIVE /HPF
BILIRUBIN URINE: NEGATIVE
BLOOD URINE: NEGATIVE
CAST: 0 /LPF
COLOR: YELLOW
EPITHELIAL CELLS: 6 /HPF
GLUCOSE QUALITATIVE U: NEGATIVE MG/DL
KETONES URINE: NEGATIVE MG/DL
LEUKOCYTE ESTERASE URINE: NEGATIVE
MICROSCOPIC-UA: NORMAL
NITRITE URINE: NEGATIVE
PH URINE: 6
PROTEIN URINE: NORMAL MG/DL
RED BLOOD CELLS URINE: 2 /HPF
SPECIFIC GRAVITY URINE: 1.01
UROBILINOGEN URINE: 0.2 MG/DL
WHITE BLOOD CELLS URINE: 0 /HPF

## 2023-07-28 LAB
ALBUMIN SERPL ELPH-MCNC: 4.1 G/DL
ALP BLD-CCNC: 100 U/L
ALT SERPL-CCNC: 22 U/L
ANION GAP SERPL CALC-SCNC: 11 MMOL/L
AST SERPL-CCNC: 25 U/L
BILIRUB SERPL-MCNC: 0.3 MG/DL
BUN SERPL-MCNC: 22 MG/DL
C3 SERPL-MCNC: 106 MG/DL
C4 SERPL-MCNC: 27 MG/DL
CALCIUM SERPL-MCNC: 9 MG/DL
CHLORIDE SERPL-SCNC: 108 MMOL/L
CK SERPL-CCNC: 61 U/L
CO2 SERPL-SCNC: 21 MMOL/L
CREAT SERPL-MCNC: 1.85 MG/DL
CREAT SPEC-SCNC: 73 MG/DL
CREAT/PROT UR: 0.2 RATIO
EGFR: 37 ML/MIN/1.73M2
POTASSIUM SERPL-SCNC: 5.9 MMOL/L
PROT SERPL-MCNC: 7 G/DL
PROT UR-MCNC: 17 MG/DL
SODIUM SERPL-SCNC: 141 MMOL/L

## 2023-07-28 RX ORDER — PATIROMER 8.4 G/1
8.4 POWDER, FOR SUSPENSION ORAL DAILY
Qty: 30 | Refills: 3 | Status: DISCONTINUED | COMMUNITY
Start: 2023-07-27 | End: 2023-07-28

## 2023-07-28 RX ORDER — SODIUM POLYSTYRENE SULFONATE 4.1 MEQ/G
POWDER, FOR SUSPENSION ORAL; RECTAL
Qty: 1 | Refills: 0 | Status: DISCONTINUED | COMMUNITY
Start: 2023-07-27 | End: 2023-07-28

## 2023-07-31 LAB
DSDNA AB SER-ACNC: <12 IU/ML
ENA RNP AB SER IA-ACNC: 0.4 AL
ENA SM AB SER IA-ACNC: <0.2 AL
ENA SS-A AB SER IA-ACNC: <0.2 AL
ENA SS-B AB SER IA-ACNC: <0.2 AL

## 2023-08-01 ENCOUNTER — APPOINTMENT (OUTPATIENT)
Dept: INTERNAL MEDICINE | Facility: CLINIC | Age: 33
End: 2023-08-01
Payer: COMMERCIAL

## 2023-08-01 PROCEDURE — 99443: CPT

## 2023-08-01 RX ORDER — LABETALOL HYDROCHLORIDE 100 MG/1
100 TABLET, FILM COATED ORAL
Qty: 180 | Refills: 1 | Status: DISCONTINUED | COMMUNITY
Start: 2023-06-16 | End: 2023-08-01

## 2023-08-01 RX ORDER — FAMOTIDINE 20 MG/1
20 TABLET, FILM COATED ORAL DAILY
Qty: 90 | Refills: 2 | Status: DISCONTINUED | COMMUNITY
Start: 2023-04-12 | End: 2023-08-01

## 2023-08-01 NOTE — ASSESSMENT
[FreeTextEntry1] : Ms. PIA PAZ is a 32 year old   female  with history of h/o SLE/lupus nephritis class III, hypertension  presented today for f/up hospitalization.   # Pancytopenia with fever, EBV infection afebrile, stable condtion at home, able to tolerate oral intake. Continue supportive care, rest, plenty of oral fluid intake.  Instructed the fatigue my last for a few months and should avoid heavy lifting or strenuous activities for 4 weeks.  Patient was advised to call us for any worsening sx or if no resolution. f/up Sita Patel.Infectious Disease 9/27/23  # SLE holding Azathioprine due to pancytopenia. continue take hydroxychloroquine 200 mg oral tablet: 1 tab(s) orally once a day f/up Uri Macias, Rheumatology 8/8/23.  # Lupus nephritis Creatinine 1.8 stable. f/up Gen Pepe, Nephrology 8/9/23.  # HTN Home BP is acceptable 120/80. Continue current management including low salt diet and regular exercise. Continue take amlodipine 10mg po qd.  RTO in 4-6 months to see Dr. Simms.

## 2023-08-01 NOTE — REVIEW OF SYSTEMS
[Fatigue] : fatigue [FreeTextEntry2] : Constitutional:  no fever and no chills. \par Eyes:  no discharge. \par HEENT:  no earache. \par Cardiovascular:  no chest pain, no palpitations and no lower extremity edema. \par Respiratory:  no shortness of breath, no wheezing and no cough. \par Gastrointestinal:  no abdominal pain, no nausea and no vomiting. \par Genitourinary:  no dysuria. \par Musculoskeletal:  no joint pain. \par Integumentary:  no itching. \par Neurological:  no headache. \par Psychiatric:  not suicidal. \par Hematologic/Lymphatic:  no easy bleeding.

## 2023-08-01 NOTE — HISTORY OF PRESENT ILLNESS
[Post-hospitalization from ___ Hospital] : Post-hospitalization from [unfilled] Hospital [Admitted on: ___] : The patient was admitted on [unfilled] [Discharged on ___] : discharged on [unfilled] [Discharge Summary] : discharge summary [Pertinent Labs] : pertinent labs [Radiology Findings] : radiology findings [Discharge Med List] : discharge medication list [Med Reconciliation] : medication reconciliation has been completed [Patient Contacted By: ____] : and contacted by [unfilled] [Home] : at home, [unfilled] , at the time of the visit. [Medical Office: ([facility])___] : at the medical office located in  [Verbal consent obtained from patient] : the patient, [unfilled] [FreeTextEntry2] : Ms. PIA PAZ is a 32 year old   female  with history of h/o SLE/lupus nephritis class III, hypertension  presented today for f/up hospitalization.  We tried multiple times for TEB via [employer] platform but it did not work. By Pt's agreement, we changed visit to TTM.  [Hospital Course] 31yo F w/ PMH of HTN, SLE, lupus nephritis Class III, CKDIII pw F/C, fatigue, sore throat and non-productive cough, meeting sepsis criteria w/ fever, HR and WBC count c/b pancytopenia and elevated liver enzymes was hospitalized for evaluation. CXR unremarkable, UA negative. BCX NGTD. RVP, HIV, acute hepatitis panel negative, Neutropenic fever likely due to EBV( 7/16/23 EBV DNA by PCR in blood 934). Also Throat strep test positive and she was treated with amoxicillin for total 10 days course including after discharge. HOLD Azathioprine on discharge until prompt follow up with hematology / rheumatology in 1 week to determine resumption of medication. Regarding Lupus nephritis, pt will f/up with nephrology Dr. Black for outpatient renal biopsy and continued management. The treatment for EBV is supportive care, fever control and good hydration/nutrition, and follow up with ID. Pt was instructed to have No heavy lifting or strenuous activity for at least 4 weeks.   Since came back home, she reportedly stable without fever, chills, RESP/GI/URO issues. Current sx is moderate fatigue. Appetite is okay and no bleeding issues. Her 5 month old son is by her and she barely went outside her house due to childcare too. She took  lab work on 7/23 that noted with hyperkalemia 5.9, Cr 1.85 e GFR 37. Repeated lab 7/30 K+ normalized to 4.4 Cr 1.8 Ferritin 75, Iron saturation 22%, chronic proteinuria 30 noted.  She made all follow up appointment. Will see Dr. Mejia rheumatology 8/8/23 for medication management and possible lab work. Her home BP was 120/70s with amlodipine 10mg po qd and no vision change, leg swelling, CP or palpitation.

## 2023-08-01 NOTE — PHYSICAL EXAM
[04331 - Moderate Complexity requires multiple possible diagnoses and/or the management options, moderate complexity of the medical data (tests, etc.) to be reviewed, and moderate risk of significant complications, morbidity, and/or mortality as well as co] : Moderate Complexity [de-identified] : TEB. General: Well-developed well-nourished in no apparent distress. Appears mildly ill.  Respiratory: Speaking in complete sentences, no respiratory distress noted. Neuro: No focal deficits noted.

## 2023-08-08 ENCOUNTER — APPOINTMENT (OUTPATIENT)
Dept: RHEUMATOLOGY | Facility: CLINIC | Age: 33
End: 2023-08-08
Payer: COMMERCIAL

## 2023-08-08 VITALS
HEIGHT: 62 IN | BODY MASS INDEX: 28.52 KG/M2 | OXYGEN SATURATION: 98 % | SYSTOLIC BLOOD PRESSURE: 122 MMHG | WEIGHT: 155 LBS | HEART RATE: 71 BPM | DIASTOLIC BLOOD PRESSURE: 87 MMHG

## 2023-08-08 DIAGNOSIS — M32.9 SYSTEMIC LUPUS ERYTHEMATOSUS, UNSPECIFIED: ICD-10-CM

## 2023-08-08 DIAGNOSIS — Z79.899 OTHER LONG TERM (CURRENT) DRUG THERAPY: ICD-10-CM

## 2023-08-08 DIAGNOSIS — R76.0 RAISED ANTIBODY TITER: ICD-10-CM

## 2023-08-08 PROCEDURE — 99214 OFFICE O/P EST MOD 30 MIN: CPT

## 2023-08-08 NOTE — ASSESSMENT
[FreeTextEntry1] : 1.  SLE/LN: Well until  at which time she developed fever and hypertension. She was referred to cardiology who noted proteinuria. There was a delay in seeing a nephrologist, but she eventually was evaluated in .  At that time her creat was 2.3.  A kidney biopsy according to the patient demonstrated class III LN.  She received high dose oral steroids but was intolerant of both MMF and mycophenolate sodium (even at low doses).  She was tried on tacrolimus and azathioprine.  The TAC was stopped after 6 months, but the AZA was continued to present. She stated that her baseline creat has been around 1.8 and that proteinuria disappeared by .  She has not had extra-renal disease. Subsequent years  through  have been quiet. She got through the pregnancy with a slight increase in her baseline creatinine. A repeat kidney biopsy was planned for mid-. 2.  OB history      a. Pregnancy No. 1:  Delivered by  on 2023 at ~36 weeks because of preeclampsia (Boy: Juancho [5 lbs 3 oz]). She stated that aPL Ab have been mostly negative. On repeat here she had a prolonged SCT.  3.  Low haptoglobin: platelets have been normal , and Hb in Oct 2022 = 10.2 4.  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  Constitutional symptoms: she reported fever, fatigue, chills during the  visit.  While she thought her SLE was flaring, her lab tests were about the same. Await repeat kidney biopsy.  6.  Pancytopenia: acute onset of pancytopenia in mid-summer 2023 requiring admission to [facility].  This episode was attributed to EBV infection.  Her counts improved, and she was discharged.   7.  Depression: being followed by a psychologist.  Plan: 1.  Lab tests   2.  Kidney biopsy being planned 3.  ECHO previously suggested 4.  Return 2 months 5.  Systemic Lupus Erythematosus, known as lupus, is a chronic autoimmune disease that can affect any organ in the body posing threats to proper organ function and even to life. Therefore, close surveillance of all bodily functions is required, including but not limited to central and peripheral nervous system, ocular and auditory systems, cardiopulmonary function, kidney function, mucocutaneous and musculoskeletal systems as well as constitutional manifestations. Surveillance consists of history, physical, and laboratory tests. Treatment varies, but most of the drugs used are high risk and therefore also require close monitoring in the form of blood and urine tests. 6.  High risk medications used in the treatment of rheumatic diseases include steroids, disease modifying agents, immunosuppressive therapies, antimalarials, biologics, and chemotherapy. Regardless of which drug or class of drug, the potential toxicities of these therapies mandate close monitoring in the form of a history, physical, and laboratory tests. 7.  Antiphospholipid Syndrome, known as APS, is a chronic autoimmune disease that leads to arterial and/or venous thrombotic events and therefore poses threats to proper organ function and even to life. Therefore, close surveillance of all bodily functions is required, including but not limited to central and peripheral nervous system, ocular and auditory systems, cardiopulmonary function, kidney function, mucocutaneous and musculoskeletal systems. Surveillance consists of history, physical, and laboratory tests. Treatment varies, but those at high risk are generally anticoagulated.  Therefore, patients with APS require close monitoring in the form of blood and urine tests.

## 2023-08-08 NOTE — HISTORY OF PRESENT ILLNESS
[FreeTextEntry1] : 1.  SLE/LN: Well until  at which time she developed fever and hypertension. She was referred to cardiology who noted proteinuria. There was a delay in seeing a nephrologist, but she eventually was evaluated in .  At that time her creat was 2.3.  A kidney biopsy according to the patient demonstrated class III LN.  She received high dose oral steroids but was intolerant of both MMF and mycophenolate sodium (even at low doses).  She was tried on tacrolimus and azathioprine.  The TAC was stopped after 6 months, but the AZA was continued to present. She stated that her baseline creat has been around 1.8 and that proteinuria disappeared by .  She has not had extra-renal disease. Subsequent years  through  have been quiet. She got through the pregnancy with a slight increase in her baseline creatinine. A repeat kidney biopsy was planned for mid-. 2.  OB history      a. Pregnancy No. 1:  Delivered by  on 2023 at ~36 weeks because of preeclampsia (Boy: Juancho [5 lbs 3 oz]). She stated that aPL Ab have been mostly negative. On repeat here she had a prolonged SCT.  3.  Low haptoglobin: platelets have been normal , and Hb in Oct 2022 = 10.2 4.  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  Constitutional symptoms: she reported fever, fatigue, chills during the  visit.  While she thought her SLE was flaring, her lab tests were about the same. Await repeat kidney biopsy.  6.  Pancytopenia: acute onset of pancytopenia in mid-summer 2023 requiring admission to [facility].  This episode was attributed to EBV infection.  Her counts improved, and she was discharged.   7.  Depression: being followed by a psychologist.  Meds  mg/d stopped hydroxychloroquine 200 mg/d amlodipine 10 mg/d azathioprine 100 mg/d stopped famotidine 20 mg/d  Vaccines PNVX 23 Prevnar 13

## 2023-08-08 NOTE — PHYSICAL EXAM
[General Appearance - Alert] : alert [General Appearance - In No Acute Distress] : in no acute distress [Sclera] : the sclera and conjunctiva were normal [Outer Ear] : the ears and nose were normal in appearance [Neck Appearance] : the appearance of the neck was normal [Heart Rate And Rhythm] : heart rate was normal and rhythm regular [Auscultation Breath Sounds / Voice Sounds] : lungs were clear to auscultation bilaterally [Heart Sounds] : normal S1 and S2 [Heart Sounds Gallop] : no gallops [Murmurs] : no murmurs [Heart Sounds Pericardial Friction Rub] : no pericardial rub [Full Pulse] : the pedal pulses are present [Edema] : there was no peripheral edema [Abdomen Soft] : soft [Abdomen Tenderness] : non-tender [Abdomen Mass (___ Cm)] : no abdominal mass palpated [Cervical Lymph Nodes Enlarged Posterior Bilaterally] : posterior cervical [Cervical Lymph Nodes Enlarged Anterior Bilaterally] : anterior cervical [Supraclavicular Lymph Nodes Enlarged Bilaterally] : supraclavicular [No CVA Tenderness] : no ~M costovertebral angle tenderness [No Spinal Tenderness] : no spinal tenderness [Abnormal Walk] : normal gait [Nail Clubbing] : no clubbing  or cyanosis of the fingernails [Musculoskeletal - Swelling] : no joint swelling seen [Motor Tone] : muscle strength and tone were normal [Skin Color & Pigmentation] : normal skin color and pigmentation [Skin Turgor] : normal skin turgor [] : no rash [Sensation] : the sensory exam was normal to light touch and pinprick [Motor Exam] : the motor exam was normal [Oriented To Time, Place, And Person] : oriented to person, place, and time [Impaired Insight] : insight and judgment were intact [Affect] : the affect was normal

## 2023-08-09 ENCOUNTER — APPOINTMENT (OUTPATIENT)
Dept: NEPHROLOGY | Facility: CLINIC | Age: 33
End: 2023-08-09
Payer: COMMERCIAL

## 2023-08-09 VITALS
TEMPERATURE: 97 F | DIASTOLIC BLOOD PRESSURE: 67 MMHG | HEART RATE: 76 BPM | BODY MASS INDEX: 28.16 KG/M2 | WEIGHT: 153 LBS | HEIGHT: 62 IN | OXYGEN SATURATION: 97 % | SYSTOLIC BLOOD PRESSURE: 119 MMHG

## 2023-08-09 LAB
ALBUMIN SERPL ELPH-MCNC: 4.1 G/DL
ALP BLD-CCNC: 81 U/L
ALT SERPL-CCNC: 15 U/L
ANION GAP SERPL CALC-SCNC: 12 MMOL/L
AST SERPL-CCNC: 24 U/L
BILIRUB SERPL-MCNC: 0.3 MG/DL
BUN SERPL-MCNC: 30 MG/DL
CALCIUM SERPL-MCNC: 9.1 MG/DL
CHLORIDE SERPL-SCNC: 106 MMOL/L
CO2 SERPL-SCNC: 20 MMOL/L
CREAT SERPL-MCNC: 1.85 MG/DL
DIRECT COOMBS: NORMAL
EBV DNA SERPL NAA+PROBE-ACNC: NOT DETECTED IU/ML
EBVPCR LOG: NOT DETECTED LOG10IU/ML
EGFR: 37 ML/MIN/1.73M2
HAPTOGLOB SERPL-MCNC: <20 MG/DL
POTASSIUM SERPL-SCNC: 4.8 MMOL/L
PROT SERPL-MCNC: 7.3 G/DL
RBC # BLD: 3.69 M/UL
RETICS # AUTO: 3.6 %
RETICS AGGREG/RBC NFR: 132.8 K/UL
SODIUM SERPL-SCNC: 137 MMOL/L

## 2023-08-09 PROCEDURE — 99214 OFFICE O/P EST MOD 30 MIN: CPT | Mod: GC

## 2023-08-09 RX ORDER — PANTOPRAZOLE 20 MG/1
20 TABLET, DELAYED RELEASE ORAL DAILY
Qty: 90 | Refills: 3 | Status: DISCONTINUED | COMMUNITY
Start: 2022-07-26 | End: 2023-08-09

## 2023-08-09 RX ORDER — FERROUS GLUCONATE 324(38)MG
324 (38 FE) TABLET ORAL DAILY
Qty: 30 | Refills: 3 | Status: DISCONTINUED | COMMUNITY
Start: 2022-11-22 | End: 2023-08-09

## 2023-08-09 RX ORDER — AZATHIOPRINE 50 1/1
50 TABLET ORAL
Qty: 180 | Refills: 3 | Status: DISCONTINUED | COMMUNITY
Start: 2022-07-26 | End: 2023-08-09

## 2023-08-11 LAB
APPEARANCE: CLEAR
BACTERIA: NEGATIVE /HPF
BILIRUBIN URINE: NEGATIVE
BLOOD URINE: NEGATIVE
CAST: 0 /LPF
COLOR: YELLOW
CREAT SPEC-SCNC: 61 MG/DL
CREAT/PROT UR: 0.4 RATIO
EPITHELIAL CELLS: 1 /HPF
GLUCOSE QUALITATIVE U: NEGATIVE MG/DL
KETONES URINE: NEGATIVE MG/DL
LEUKOCYTE ESTERASE URINE: NEGATIVE
MICROSCOPIC-UA: NORMAL
NITRITE URINE: NEGATIVE
PH URINE: 6
PROT UR-MCNC: 21 MG/DL
PROTEIN URINE: 30 MG/DL
RED BLOOD CELLS URINE: 1 /HPF
SPECIFIC GRAVITY URINE: 1.01
UROBILINOGEN URINE: 0.2 MG/DL
WHITE BLOOD CELLS URINE: 0 /HPF

## 2023-08-11 NOTE — ASSESSMENT
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 She was also started on tacro and MMF. Tacrolimus was 3/3 tapered and she was weaned off of it summer 2021 due to concern for 'interstitial nephritis.' Was on Mycophenolate 2 tabs bid which she didn't tolerate due to GI side effects, and was changed to Myfortic with which she still had diarrhea, vomiting; Then transitioned 3 mos later to myfortic 320mg bid which she didn't tolerate and it was switched to Imuran in March 2021.   -Creatinine hx per pt:  2019 cr 1.7-1.9 2020 cr 2.3 at time of biopsy cr downtrended with treatment 1.9 Dec 2020 cr 1.5 2021 (3 tabs aza) cr 1.6-1.7 2022 when aza decreased and approx for one year it has been stable  -cr July 2022 1.4  -cr Aug 2022 1.3 -cr sept 1.43 -cr oct 1.40 -cr nov with OB 1.49 -Nov 9th 1.43 -nov 22 1.46 -Dec 1.43 -Dec 1.69 -cr mild uptrend and proteinuria to approx 600mg-700mg (from stable 300mg) so she was sent to hospital for delivery; Had KATIE in hospital with downtrending cr to 1.79;  -creatinine since DC postpartum mild uptrending 1.89 to 2.08 then on labs in Feb 2023 1.85, prot/cr 0.7  -Creat 1.85 on 8/9/23 - Will discuss with Dr. Forrest  ?need for imuran at this time -cont Plaquenil 200 qD. Imuran has been on hold since admission to hospital in July 2023, cont to hold for now; no evidence of renal ctivity -needs ACE or ARB but she is breastfeeding   #Non nephrotic range proteinuria-  june 2020 prot/cr 1.5g dec 29 2020 prot.cr 0.1 june 2022 0.1 July 0.3 July 0.2 August 0.7  August 0.2 Sept 0.7  oct 0.2 nov 0.3  dec 0.7 dec 0.6 postpartum:  Jan 0.8 prot/cr Feb 0.7 prot/cr July 0.2 prot/cr  #SLE-Pt has had negative lupus serologies in the past & lupus serologies negative during pregnancy; got repeat labs on 8/8. Will discuss with Dr. Forrest if Imuran needs to be restarted at this point for SLE reasons  #HTN- on Amlodipine 10mg daily; Monitor BP  #Anemia-stable; hapto was low during pregnancy; low hapto and platets during mono admission #serology sent at dr forrest office July 2022- mild phosphotidylserine + and + silica clotting but neg DRVVT off Asa per Rheum

## 2023-08-11 NOTE — HISTORY OF PRESENT ILLNESS
[FreeTextEntry1] : Here for follow up  post partum. stopped breast feeding last chela Since last visit patient admitted to [facility] for Mono, strep, pancytopenia/ tma in July.   Imuran has been held since that admission.  Course at home complicated by hyperkalemia- requiring low k diet, and dose of lokelma/lasix; repeat k ok  BP at home around 120/80s She is hydrating well and feeling well overall;  just feels overwhelmed by the doctor appointments Saw heme and they report prolong ptt time from silica clotting time. mild anticoagulant but not the full IAPLS disorder. Not a bleeding condition no leg swelling with walking decrease in foamy or bubbly urine no burning or pain with urination no sob other ROS neg  Meds: Pepcid 20mg Plaquenil 200mg daily Amlodipine 10mg daily

## 2023-08-22 LAB — VWF MULTIMERS PPP IA-ACNC: NORMAL

## 2023-09-01 LAB
ALBUMIN SERPL ELPH-MCNC: 4.1 G/DL
ANION GAP SERPL CALC-SCNC: 13 MMOL/L
APPEARANCE: CLEAR
BACTERIA: NEGATIVE /HPF
BASOPHILS # BLD AUTO: 0.02 K/UL
BASOPHILS NFR BLD AUTO: 0.6 %
BILIRUBIN URINE: NEGATIVE
BLOOD URINE: ABNORMAL
BUN SERPL-MCNC: 28 MG/DL
C3 SERPL-MCNC: 114 MG/DL
C4 SERPL-MCNC: 24 MG/DL
CALCIUM SERPL-MCNC: 9.1 MG/DL
CAST: 2 /LPF
CHLORIDE SERPL-SCNC: 107 MMOL/L
CO2 SERPL-SCNC: 20 MMOL/L
COLOR: YELLOW
CREAT SERPL-MCNC: 1.82 MG/DL
CREAT SPEC-SCNC: 80 MG/DL
CREAT/PROT UR: 0.5 RATIO
DSDNA AB SER-ACNC: <12 IU/ML
EGFR: 38 ML/MIN/1.73M2
EOSINOPHIL # BLD AUTO: 0.1 K/UL
EOSINOPHIL NFR BLD AUTO: 3 %
EPITHELIAL CELLS: 2 /HPF
FERRITIN SERPL-MCNC: 16 NG/ML
GLUCOSE QUALITATIVE U: NEGATIVE MG/DL
GLUCOSE SERPL-MCNC: 88 MG/DL
HAPTOGLOB SERPL-MCNC: <20 MG/DL
HCT VFR BLD CALC: 35.4 %
HGB BLD-MCNC: 11.8 G/DL
IMM GRANULOCYTES NFR BLD AUTO: 0 %
IRON SATN MFR SERPL: 15 %
IRON SERPL-MCNC: 51 UG/DL
KETONES URINE: NEGATIVE MG/DL
LEUKOCYTE ESTERASE URINE: NEGATIVE
LYMPHOCYTES # BLD AUTO: 1.43 K/UL
LYMPHOCYTES NFR BLD AUTO: 42.8 %
MAN DIFF?: NORMAL
MCHC RBC-ENTMCNC: 29.4 PG
MCHC RBC-ENTMCNC: 33.3 GM/DL
MCV RBC AUTO: 88.3 FL
MICROSCOPIC-UA: NORMAL
MONOCYTES # BLD AUTO: 0.26 K/UL
MONOCYTES NFR BLD AUTO: 7.8 %
NEUTROPHILS # BLD AUTO: 1.53 K/UL
NEUTROPHILS NFR BLD AUTO: 45.8 %
NITRITE URINE: NEGATIVE
PH URINE: 5.5
PHOSPHATE SERPL-MCNC: 4.1 MG/DL
PLATELET # BLD AUTO: 177 K/UL
POTASSIUM SERPL-SCNC: 5.1 MMOL/L
PROT UR-MCNC: 40 MG/DL
PROTEIN URINE: 30 MG/DL
RBC # BLD: 4.01 M/UL
RBC # FLD: 13.6 %
RED BLOOD CELLS URINE: 1 /HPF
SODIUM SERPL-SCNC: 140 MMOL/L
SPECIFIC GRAVITY URINE: 1.01
TIBC SERPL-MCNC: 333 UG/DL
UIBC SERPL-MCNC: 282 UG/DL
UROBILINOGEN URINE: 0.2 MG/DL
WBC # FLD AUTO: 3.34 K/UL
WHITE BLOOD CELLS URINE: 0 /HPF

## 2023-09-19 ENCOUNTER — RX RENEWAL (OUTPATIENT)
Age: 33
End: 2023-09-19

## 2023-09-19 RX ORDER — AMLODIPINE BESYLATE 10 MG/1
10 TABLET ORAL
Qty: 90 | Refills: 2 | Status: ACTIVE | COMMUNITY
Start: 2022-07-26 | End: 1900-01-01

## 2023-09-25 ENCOUNTER — APPOINTMENT (OUTPATIENT)
Dept: INFECTIOUS DISEASE | Facility: CLINIC | Age: 33
End: 2023-09-25

## 2023-09-27 ENCOUNTER — APPOINTMENT (OUTPATIENT)
Dept: INTERNAL MEDICINE | Facility: CLINIC | Age: 33
End: 2023-09-27

## 2023-10-11 ENCOUNTER — APPOINTMENT (OUTPATIENT)
Dept: NEPHROLOGY | Facility: CLINIC | Age: 33
End: 2023-10-11
Payer: COMMERCIAL

## 2023-10-11 VITALS
HEART RATE: 74 BPM | TEMPERATURE: 97.7 F | SYSTOLIC BLOOD PRESSURE: 132 MMHG | BODY MASS INDEX: 28.8 KG/M2 | OXYGEN SATURATION: 99 % | DIASTOLIC BLOOD PRESSURE: 94 MMHG | WEIGHT: 156.53 LBS | HEIGHT: 62 IN

## 2023-10-11 VITALS — DIASTOLIC BLOOD PRESSURE: 80 MMHG | SYSTOLIC BLOOD PRESSURE: 122 MMHG

## 2023-10-11 DIAGNOSIS — M32.14 GLOMERULAR DISEASE IN SYSTEMIC LUPUS ERYTHEMATOSUS: ICD-10-CM

## 2023-10-11 DIAGNOSIS — R80.9 PROTEINURIA, UNSPECIFIED: ICD-10-CM

## 2023-10-11 PROCEDURE — 99214 OFFICE O/P EST MOD 30 MIN: CPT

## 2023-10-11 RX ORDER — SODIUM ZIRCONIUM CYCLOSILICATE 10 G/10G
10 POWDER, FOR SUSPENSION ORAL DAILY
Qty: 14 | Refills: 0 | Status: DISCONTINUED | COMMUNITY
Start: 2023-07-28 | End: 2023-10-11

## 2023-10-11 RX ORDER — ESTRADIOL 0.1 MG/G
0.1 CREAM VAGINAL
Qty: 1 | Refills: 1 | Status: DISCONTINUED | COMMUNITY
Start: 2023-02-23 | End: 2023-10-11

## 2023-10-11 RX ORDER — FUROSEMIDE 40 MG/1
40 TABLET ORAL
Qty: 7 | Refills: 0 | Status: DISCONTINUED | COMMUNITY
Start: 2023-07-28 | End: 2023-10-11

## 2023-10-13 ENCOUNTER — TRANSCRIPTION ENCOUNTER (OUTPATIENT)
Age: 33
End: 2023-10-13

## 2023-10-13 PROBLEM — R80.9 PROTEINURIA: Status: ACTIVE | Noted: 2022-08-23

## 2023-10-13 PROBLEM — M32.14 LUPUS NEPHRITIS: Status: ACTIVE | Noted: 2022-07-08

## 2023-10-13 LAB
25(OH)D3 SERPL-MCNC: 25.4 NG/ML
ALBUMIN SERPL ELPH-MCNC: 4.1 G/DL
ANION GAP SERPL CALC-SCNC: 16 MMOL/L
APPEARANCE: CLEAR
APTT BLD: 34.7 SEC
BACTERIA: NEGATIVE /HPF
BASOPHILS # BLD AUTO: 0.02 K/UL
BASOPHILS NFR BLD AUTO: 0.5 %
BILIRUBIN URINE: NEGATIVE
BLOOD URINE: NEGATIVE
BUN SERPL-MCNC: 29 MG/DL
C3 SERPL-MCNC: 133 MG/DL
C4 SERPL-MCNC: 30 MG/DL
CALCIUM SERPL-MCNC: 9.4 MG/DL
CAST: 0 /LPF
CHLORIDE SERPL-SCNC: 105 MMOL/L
CHOLEST SERPL-MCNC: 229 MG/DL
CO2 SERPL-SCNC: 20 MMOL/L
COLOR: YELLOW
CREAT SERPL-MCNC: 2.01 MG/DL
CREAT SPEC-SCNC: 38 MG/DL
CREAT/PROT UR: 0.8 RATIO
DSDNA AB SER-ACNC: <12 IU/ML
EGFR: 34 ML/MIN/1.73M2
EOSINOPHIL # BLD AUTO: 0.11 K/UL
EOSINOPHIL NFR BLD AUTO: 2.6 %
EPITHELIAL CELLS: 4 /HPF
ESTIMATED AVERAGE GLUCOSE: 85 MG/DL
FERRITIN SERPL-MCNC: 17 NG/ML
GLUCOSE QUALITATIVE U: NEGATIVE MG/DL
GLUCOSE SERPL-MCNC: 95 MG/DL
HAPTOGLOB SERPL-MCNC: 20 MG/DL
HBA1C MFR BLD HPLC: 4.6 %
HCT VFR BLD CALC: 35.7 %
HDLC SERPL-MCNC: 45 MG/DL
HGB BLD-MCNC: 11.6 G/DL
IMM GRANULOCYTES NFR BLD AUTO: 0.2 %
INR PPP: 0.94 RATIO
IRON SATN MFR SERPL: 26 %
IRON SERPL-MCNC: 86 UG/DL
KETONES URINE: NEGATIVE MG/DL
LDLC SERPL CALC-MCNC: 153 MG/DL
LEUKOCYTE ESTERASE URINE: NEGATIVE
LYMPHOCYTES # BLD AUTO: 1.23 K/UL
LYMPHOCYTES NFR BLD AUTO: 28.8 %
MAN DIFF?: NORMAL
MCHC RBC-ENTMCNC: 27.7 PG
MCHC RBC-ENTMCNC: 32.5 GM/DL
MCV RBC AUTO: 85.2 FL
MICROSCOPIC-UA: NORMAL
MONOCYTES # BLD AUTO: 0.26 K/UL
MONOCYTES NFR BLD AUTO: 6.1 %
NEUTROPHILS # BLD AUTO: 2.64 K/UL
NEUTROPHILS NFR BLD AUTO: 61.8 %
NITRITE URINE: NEGATIVE
NONHDLC SERPL-MCNC: 184 MG/DL
PH URINE: 7
PHOSPHATE SERPL-MCNC: 4.1 MG/DL
PLATELET # BLD AUTO: 215 K/UL
POTASSIUM SERPL-SCNC: 4.6 MMOL/L
PROT UR-MCNC: 30 MG/DL
PROTEIN URINE: 30 MG/DL
PT BLD: 10.6 SEC
RBC # BLD: 4.19 M/UL
RBC # FLD: 13.8 %
RED BLOOD CELLS URINE: 0 /HPF
SODIUM SERPL-SCNC: 140 MMOL/L
SPECIFIC GRAVITY URINE: 1.01
TIBC SERPL-MCNC: 337 UG/DL
TRIGL SERPL-MCNC: 171 MG/DL
TSH SERPL-ACNC: 3.31 UIU/ML
UIBC SERPL-MCNC: 251 UG/DL
URATE SERPL-MCNC: 10.3 MG/DL
UROBILINOGEN URINE: 0.2 MG/DL
WBC # FLD AUTO: 4.27 K/UL
WHITE BLOOD CELLS URINE: 1 /HPF

## 2023-10-18 DIAGNOSIS — F41.8 OTHER SPECIFIED ANXIETY DISORDERS: ICD-10-CM

## 2023-10-18 RX ORDER — LORAZEPAM 0.5 MG/1
0.5 TABLET ORAL
Qty: 2 | Refills: 0 | Status: ACTIVE | COMMUNITY
Start: 2023-10-18 | End: 1900-01-01

## 2023-10-19 ENCOUNTER — RESULT REVIEW (OUTPATIENT)
Age: 33
End: 2023-10-19

## 2023-10-19 ENCOUNTER — OUTPATIENT (OUTPATIENT)
Dept: OUTPATIENT SERVICES | Facility: HOSPITAL | Age: 33
LOS: 1 days | End: 2023-10-19
Payer: COMMERCIAL

## 2023-10-19 ENCOUNTER — APPOINTMENT (OUTPATIENT)
Dept: ULTRASOUND IMAGING | Facility: HOSPITAL | Age: 33
End: 2023-10-19

## 2023-10-19 DIAGNOSIS — N18.30 CHRONIC KIDNEY DISEASE, STAGE 3 UNSPECIFIED: ICD-10-CM

## 2023-10-19 DIAGNOSIS — K08.409 PARTIAL LOSS OF TEETH, UNSPECIFIED CAUSE, UNSPECIFIED CLASS: Chronic | ICD-10-CM

## 2023-10-19 PROCEDURE — 88313 SPECIAL STAINS GROUP 2: CPT | Mod: 26

## 2023-10-19 PROCEDURE — 50200 RENAL BIOPSY PERQ: CPT | Mod: LT

## 2023-10-19 PROCEDURE — 88350 IMFLUOR EA ADDL 1ANTB STN PX: CPT

## 2023-10-19 PROCEDURE — 76942 ECHO GUIDE FOR BIOPSY: CPT

## 2023-10-19 PROCEDURE — 76942 ECHO GUIDE FOR BIOPSY: CPT | Mod: 26

## 2023-10-19 PROCEDURE — 50200 RENAL BIOPSY PERQ: CPT

## 2023-10-19 PROCEDURE — 88313 SPECIAL STAINS GROUP 2: CPT

## 2023-10-19 PROCEDURE — 88346 IMFLUOR 1ST 1ANTB STAIN PX: CPT

## 2023-10-19 PROCEDURE — 88348 ELECTRON MICROSCOPY DX: CPT | Mod: 26

## 2023-10-19 PROCEDURE — 88305 TISSUE EXAM BY PATHOLOGIST: CPT

## 2023-10-19 PROCEDURE — 88348 ELECTRON MICROSCOPY DX: CPT

## 2023-10-19 PROCEDURE — 88346 IMFLUOR 1ST 1ANTB STAIN PX: CPT | Mod: 26

## 2023-10-19 PROCEDURE — 88305 TISSUE EXAM BY PATHOLOGIST: CPT | Mod: 26

## 2023-10-19 PROCEDURE — 88350 IMFLUOR EA ADDL 1ANTB STN PX: CPT | Mod: 26

## 2023-10-24 LAB
SURGICAL PATHOLOGY STUDY: SIGNIFICANT CHANGE UP
SURGICAL PATHOLOGY STUDY: SIGNIFICANT CHANGE UP

## 2023-10-26 ENCOUNTER — TRANSCRIPTION ENCOUNTER (OUTPATIENT)
Age: 33
End: 2023-10-26

## 2023-11-01 ENCOUNTER — TRANSCRIPTION ENCOUNTER (OUTPATIENT)
Age: 33
End: 2023-11-01

## 2023-11-17 ENCOUNTER — NON-APPOINTMENT (OUTPATIENT)
Age: 33
End: 2023-11-17

## 2023-11-17 LAB
ALBUMIN SERPL ELPH-MCNC: 4.6 G/DL
ANION GAP SERPL CALC-SCNC: 13 MMOL/L
BUN SERPL-MCNC: 43 MG/DL
CALCIUM SERPL-MCNC: 9.5 MG/DL
CHLORIDE SERPL-SCNC: 104 MMOL/L
CO2 SERPL-SCNC: 20 MMOL/L
CREAT SERPL-MCNC: 1.98 MG/DL
EGFR: 34 ML/MIN/1.73M2
GLUCOSE SERPL-MCNC: 82 MG/DL
PHOSPHATE SERPL-MCNC: 4.3 MG/DL
POTASSIUM SERPL-SCNC: 4.7 MMOL/L
SODIUM SERPL-SCNC: 137 MMOL/L

## 2024-01-02 NOTE — H&P ADULT - PROBLEM SELECTOR PLAN 5
- c/w Amlodipine 10mg qd w/ hold parameters decreased ability to use arms for pushing/pulling/decreased ability to use legs for bridging/pushing

## 2024-02-09 ENCOUNTER — NON-APPOINTMENT (OUTPATIENT)
Age: 34
End: 2024-02-09

## 2024-02-23 NOTE — OB RN PATIENT PROFILE - NS_PRENATALSTART_OBGYN_ALL_OB
Detail Level: Detailed
Quality 358: Patient-Centered Surgical Risk Assessment And Communication: Documentation of patient-specific risk assessment with a risk calculator based on multi-institutional clinical data, the specific risk calculator used, and communication of risk assessment from risk calculator with the patient or family.
Started first trimester

## 2024-03-12 ENCOUNTER — APPOINTMENT (OUTPATIENT)
Dept: NEPHROLOGY | Facility: CLINIC | Age: 34
End: 2024-03-12
Payer: COMMERCIAL

## 2024-03-12 VITALS
TEMPERATURE: 97.9 F | DIASTOLIC BLOOD PRESSURE: 79 MMHG | OXYGEN SATURATION: 99 % | SYSTOLIC BLOOD PRESSURE: 116 MMHG | BODY MASS INDEX: 29.41 KG/M2 | HEIGHT: 62 IN | WEIGHT: 159.83 LBS | HEART RATE: 68 BPM

## 2024-03-12 VITALS — SYSTOLIC BLOOD PRESSURE: 106 MMHG | DIASTOLIC BLOOD PRESSURE: 78 MMHG

## 2024-03-12 DIAGNOSIS — I10 ESSENTIAL (PRIMARY) HYPERTENSION: ICD-10-CM

## 2024-03-12 DIAGNOSIS — E87.5 HYPERKALEMIA: ICD-10-CM

## 2024-03-12 LAB
ALBUMIN SERPL ELPH-MCNC: 4.3 G/DL
ANION GAP SERPL CALC-SCNC: 10 MMOL/L
APPEARANCE: CLEAR
BACTERIA: NEGATIVE /HPF
BILIRUBIN URINE: NEGATIVE
BLOOD URINE: NEGATIVE
BUN SERPL-MCNC: 31 MG/DL
C3 SERPL-MCNC: 133 MG/DL
C4 SERPL-MCNC: 29 MG/DL
CALCIUM SERPL-MCNC: 9.4 MG/DL
CAST: 0 /LPF
CHLORIDE SERPL-SCNC: 105 MMOL/L
CO2 SERPL-SCNC: 22 MMOL/L
COLOR: YELLOW
CREAT SERPL-MCNC: 1.9 MG/DL
CREAT SPEC-SCNC: 38 MG/DL
CREAT/PROT UR: 0.6 RATIO
DSDNA AB SER-ACNC: <12 IU/ML
EGFR: 36 ML/MIN/1.73M2
EPITHELIAL CELLS: 4 /HPF
FERRITIN SERPL-MCNC: 20 NG/ML
GLUCOSE QUALITATIVE U: NEGATIVE MG/DL
GLUCOSE SERPL-MCNC: 89 MG/DL
HAPTOGLOB SERPL-MCNC: <20 MG/DL
HCT VFR BLD CALC: 33.5 %
HGB BLD-MCNC: 11.1 G/DL
IRON SATN MFR SERPL: 14 %
IRON SERPL-MCNC: 46 UG/DL
KETONES URINE: NEGATIVE MG/DL
LDH SERPL-CCNC: 260 U/L
LEUKOCYTE ESTERASE URINE: NEGATIVE
MCHC RBC-ENTMCNC: 28.6 PG
MCHC RBC-ENTMCNC: 33.1 GM/DL
MCV RBC AUTO: 86.3 FL
MICROSCOPIC-UA: NORMAL
NITRITE URINE: NEGATIVE
PH URINE: 6
PHOSPHATE SERPL-MCNC: 4 MG/DL
PLATELET # BLD AUTO: 256 K/UL
POTASSIUM SERPL-SCNC: 4.7 MMOL/L
PROT UR-MCNC: 23 MG/DL
PROTEIN URINE: 30 MG/DL
RBC # BLD: 3.88 M/UL
RBC # FLD: 14.5 %
RED BLOOD CELLS URINE: 0 /HPF
SODIUM SERPL-SCNC: 136 MMOL/L
SPECIFIC GRAVITY URINE: 1.01
TIBC SERPL-MCNC: 330 UG/DL
UIBC SERPL-MCNC: 284 UG/DL
UROBILINOGEN URINE: 0.2 MG/DL
WBC # FLD AUTO: 4.09 K/UL
WHITE BLOOD CELLS URINE: 0 /HPF

## 2024-03-12 PROCEDURE — 99214 OFFICE O/P EST MOD 30 MIN: CPT

## 2024-03-12 NOTE — ASSESSMENT
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 She was also started on tacro and MMF. Tacrolimus was 3/3 tapered and she was weaned off of it summer 2021 due to concern for 'interstitial nephritis.' Was on Mycophenolate 2 tabs bid which she didn't tolerate due to GI side effects, and was changed to Myfortic with which she still had diarrhea, vomiting; Then transitioned 3 mos later to myfortic 320mg bid which she didn't tolerate and it was switched to Imuran in March 2021 to get pregnant Renal Biopsy Oct 2023: insuffient sample on biopsy; 50-83% GS; mild-mod ifta; no overt activity IF: neg; EM no significant glomeruli- --prepregnancy cr 1.6 lauryn; -postpartum now 1.9-2.0, off IS -  Imuran on hold since July admission 2023; also no evidence of activity on biopsy -plan to monitor cr and proteinuria  -cont Plaquenil 200 qD   -max acei -consider farxiga in future  #Non nephrotic range proteinuria- june 2020 prot/cr 1.5g dec 29 2020 prot.cr 0.1 june 2022 0.1 postpartum: Jan 0.8 prot/cr Feb 0.7 prot/cr July 0.2 prot/cr Aug 0.5 prot/cr March 2024 prot/cr 0.6; plan to max acei  #met acidosis- on bicarb tabs 650mg tid; cont;  #hyperkalemia- low k diet; monitor k trend on ace #SLE-Pt has had negative lupus serologies in the past & lupus serologies negative during pregnancy; on plaquenil; advised follow up with Dr Forrest #HTN- on Amlodipine 10mg daily; Uptitrae ace1 from Lisinopril 2.5 to 5mg daily; downtitrate amlodipine; repeat k/cr in two weeks march 29th; she understands  #Anemia-stable; hapto was low during pregnancy; low hapto and platets during mono admission; hb stable now; hapto remains low #serology sent at dr forrest office July 2022- mild phosphotidylserine + and + silica clotting but neg DRVVT h/o high PTT- cleared by heme  # check  TSH Vit D advised pmd follow up # repeat labs week of march 29

## 2024-03-12 NOTE — HISTORY OF PRESENT ILLNESS
[FreeTextEntry1] : Here for follow up  One year post partum now  Feels tired, fatigue Son co-sleeps with her, can keep her up on/off States she has not lost weight    Denies leg swelling No foamy urine/bubbly urine Other ROS neg Checks BP at home 120s/80s  Meds: Plaquenil 200mg daily Lisinopril 2.5mg daily Amlodipine 10mg daily Famotidine 20mg daily Sodium bicarb 650mg tid iron 3x/week  Labs reviewed from Feb 2024 labs  prot/cr 0.6

## 2024-03-12 NOTE — PHYSICAL EXAM
[General Appearance - Alert] : alert [General Appearance - In No Acute Distress] : in no acute distress [General Appearance - Well Nourished] : well nourished [General Appearance - Well Developed] : well developed [Sclera] : the sclera and conjunctiva were normal [Outer Ear] : the ears and nose were normal in appearance [Neck Appearance] : the appearance of the neck was normal [Respiration, Rhythm And Depth] : normal respiratory rhythm and effort [] : no respiratory distress [Auscultation Breath Sounds / Voice Sounds] : lungs were clear to auscultation bilaterally [Heart Rate And Rhythm] : heart rate was normal and rhythm regular [Apical Impulse] : the apical impulse was normal [Heart Sounds] : normal S1 and S2 [Edema] : there was no peripheral edema [FreeTextEntry1] :  1+ edema to midshin  1+ edema to midshin 1+ edema midshin b/l [Bowel Sounds] : normal bowel sounds [Abdomen Soft] : soft [Abdomen Tenderness] : non-tender [No CVA Tenderness] : no ~M costovertebral angle tenderness [Abnormal Walk] : normal gait [Skin Color & Pigmentation] : normal skin color and pigmentation [Skin Turgor] : normal skin turgor [No Focal Deficits] : no focal deficits [Oriented To Time, Place, And Person] : oriented to person, place, and time [Impaired Insight] : insight and judgment were intact [Affect] : the affect was normal

## 2024-04-02 NOTE — PROGRESS NOTE ADULT - PROBLEM SELECTOR PROBLEM 2
Nurse already addressed  
Lupus nephritis

## 2024-04-03 DIAGNOSIS — E87.20 ACIDOSIS, UNSPECIFIED: ICD-10-CM

## 2024-04-03 RX ORDER — ERGOCALCIFEROL 1.25 MG/1
1.25 MG CAPSULE ORAL
Qty: 4 | Refills: 5 | Status: ACTIVE | COMMUNITY
Start: 2024-04-03 | End: 1900-01-01

## 2024-04-03 RX ORDER — SODIUM BICARBONATE 650 MG/1
650 TABLET ORAL 3 TIMES DAILY
Qty: 540 | Refills: 3 | Status: DISCONTINUED | COMMUNITY
Start: 2023-09-01 | End: 2024-04-03

## 2024-04-08 ENCOUNTER — NON-APPOINTMENT (OUTPATIENT)
Age: 34
End: 2024-04-08

## 2024-04-08 LAB
25(OH)D3 SERPL-MCNC: 21 NG/ML
ALBUMIN SERPL ELPH-MCNC: 4.3 G/DL
ANION GAP SERPL CALC-SCNC: 12 MMOL/L
BUN SERPL-MCNC: 35 MG/DL
CALCIUM SERPL-MCNC: 9.6 MG/DL
CHLORIDE SERPL-SCNC: 104 MMOL/L
CO2 SERPL-SCNC: 19 MMOL/L
CREAT SERPL-MCNC: 1.96 MG/DL
EGFR: 34 ML/MIN/1.73M2
GLUCOSE SERPL-MCNC: 90 MG/DL
HCT VFR BLD CALC: 35 %
HGB BLD-MCNC: 11.8 G/DL
MCHC RBC-ENTMCNC: 28.5 PG
MCHC RBC-ENTMCNC: 33.7 GM/DL
MCV RBC AUTO: 84.5 FL
PHOSPHATE SERPL-MCNC: 3.8 MG/DL
PLATELET # BLD AUTO: 200 K/UL
POTASSIUM SERPL-SCNC: 5.3 MMOL/L
RBC # BLD: 4.14 M/UL
RBC # FLD: 14.1 %
SODIUM SERPL-SCNC: 136 MMOL/L
TSH SERPL-ACNC: 2.31 UIU/ML
WBC # FLD AUTO: 4.5 K/UL

## 2024-04-11 ENCOUNTER — NON-APPOINTMENT (OUTPATIENT)
Age: 34
End: 2024-04-11

## 2024-04-11 DIAGNOSIS — E87.5 HYPERKALEMIA: ICD-10-CM

## 2024-04-11 LAB
ALBUMIN SERPL ELPH-MCNC: 4.4 G/DL
ANION GAP SERPL CALC-SCNC: 12 MMOL/L
BUN SERPL-MCNC: 36 MG/DL
CALCIUM SERPL-MCNC: 9.5 MG/DL
CHLORIDE SERPL-SCNC: 104 MMOL/L
CO2 SERPL-SCNC: 21 MMOL/L
CREAT SERPL-MCNC: 2.2 MG/DL
EGFR: 30 ML/MIN/1.73M2
GLUCOSE SERPL-MCNC: 83 MG/DL
PHOSPHATE SERPL-MCNC: 4.8 MG/DL
POTASSIUM SERPL-SCNC: 6 MMOL/L
SODIUM SERPL-SCNC: 137 MMOL/L

## 2024-04-11 RX ORDER — SODIUM ZIRCONIUM CYCLOSILICATE 10 G/10G
10 POWDER, FOR SUSPENSION ORAL 3 TIMES DAILY
Qty: 10 | Refills: 0 | Status: ACTIVE | COMMUNITY
Start: 2024-04-11 | End: 1900-01-01

## 2024-04-11 RX ORDER — FUROSEMIDE 40 MG/1
40 TABLET ORAL
Qty: 7 | Refills: 0 | Status: ACTIVE | COMMUNITY
Start: 2024-04-11 | End: 1900-01-01

## 2024-04-12 ENCOUNTER — NON-APPOINTMENT (OUTPATIENT)
Age: 34
End: 2024-04-12

## 2024-04-12 LAB
ALBUMIN SERPL ELPH-MCNC: 4.6 G/DL
ANION GAP SERPL CALC-SCNC: 14 MMOL/L
BUN SERPL-MCNC: 41 MG/DL
CALCIUM SERPL-MCNC: 9.1 MG/DL
CHLORIDE SERPL-SCNC: 100 MMOL/L
CO2 SERPL-SCNC: 22 MMOL/L
CREAT SERPL-MCNC: 2.18 MG/DL
EGFR: 30 ML/MIN/1.73M2
GLUCOSE SERPL-MCNC: 65 MG/DL
PHOSPHATE SERPL-MCNC: 3.9 MG/DL
POTASSIUM SERPL-SCNC: 4.8 MMOL/L
SODIUM SERPL-SCNC: 135 MMOL/L

## 2024-04-12 RX ORDER — SODIUM ZIRCONIUM CYCLOSILICATE 10 G/10G
10 POWDER, FOR SUSPENSION ORAL
Qty: 30 | Refills: 0 | Status: ACTIVE | COMMUNITY
Start: 2024-04-12 | End: 1900-01-01

## 2024-04-25 ENCOUNTER — NON-APPOINTMENT (OUTPATIENT)
Age: 34
End: 2024-04-25

## 2024-04-26 RX ORDER — LISINOPRIL 2.5 MG/1
2.5 TABLET ORAL
Qty: 90 | Refills: 3 | Status: ACTIVE | COMMUNITY
Start: 2023-10-27 | End: 1900-01-01

## 2024-04-29 RX ORDER — HYDROXYCHLOROQUINE SULFATE 200 MG/1
200 TABLET, FILM COATED ORAL DAILY
Qty: 90 | Refills: 3 | Status: ACTIVE | COMMUNITY
Start: 2022-07-26 | End: 1900-01-01

## 2024-05-14 RX ORDER — FAMOTIDINE 20 MG/1
20 TABLET, FILM COATED ORAL DAILY
Qty: 90 | Refills: 3 | Status: ACTIVE | COMMUNITY
Start: 2023-04-19 | End: 1900-01-01

## 2024-05-17 NOTE — HISTORY OF PRESENT ILLNESS
[FreeTextEntry1] : New patient here to [facility]  [de-identified] : Ms. PIA PAZ is a 33 year old   female  with history of SLE/lupus nephritis class 3, HTN, anemia presented today for comprehensive evaluation.  ------ RE preg: being managed by Bertha Frey (maternal fetal medicine).  RE anemia: seen by PAULA Montez NP/ Troy Gomez MD - w/u in  progress RE SLE: presented in 2020 - followed by João Mejia, on Plaquenil and azathioprine.  RE LN: bx c/w class 3, followed by Sarah Wilson, Cr 2.3 prior to tx, most recent 1.4 RE HTN: followed by Robyn Ashraf NP - on amlodipine and labetalol. most recent bp 112/68  Now that she is getting closer to delivery date, has questions about labor, delivery, nursing.

## 2024-05-17 NOTE — HEALTH RISK ASSESSMENT
[Never (0 pts)] : Never (0 points) [No falls in past year] : Patient reported no falls in the past year [0] : 2) Feeling down, depressed, or hopeless: Not at all (0) [No Retinopathy] : No retinopathy [Patient reported PAP Smear was normal] : Patient reported PAP Smear was normal [HIV test declined] : HIV test declined [Hepatitis C test declined] : Hepatitis C test declined [None] : None [With Family] : lives with family [] :  [Sexually Active] : sexually active [Feels Safe at Home] : Feels safe at home [Fully functional (bathing, dressing, toileting, transferring, walking, feeding)] : Fully functional (bathing, dressing, toileting, transferring, walking, feeding) [Fully functional (using the telephone, shopping, preparing meals, housekeeping, doing laundry, using] : Fully functional and needs no help or supervision to perform IADLs (using the telephone, shopping, preparing meals, housekeeping, doing laundry, using transportation, managing medications and managing finances) [Smoke Detector] : smoke detector [Carbon Monoxide Detector] : carbon monoxide detector [Seat Belt] :  uses seat belt [With Patient/Caregiver] : , with patient/caregiver [Name: ___] : Health Care Proxy's Name: [unfilled]  [de-identified] : pre-preg - rare - now during preg - no ETOH at all [Audit-CScore] : 0 [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  [de-identified] : vegetarian - for protein - nut , cheese , beans, no fish, rare eggs [AKK3Cslme] : 0 [EyeExamDate] : 04/21 [Change in mental status noted] : No change in mental status noted [Reports changes in hearing] : Reports no changes in hearing [Reports changes in vision] : Reports no changes in vision [Guns at Home] : no guns at home [MammogramComments] : na [PapSmearDate] : 02/22 [BoneDensityComments] : na [ColonoscopyComments] : na - no fam hx [FreeTextEntry3] : first preg [AdvancecareDate] : 10/22

## 2024-05-17 NOTE — ASSESSMENT
[FreeTextEntry1] : 32 yo F with h/o SLE/lupus nephritis class 3, HTN, currently ~ 26 wks pregnant, recently seen by Heme for anemia   RE preg: being managed by Bertha Frey (maternal fetal medicine).  RE anemia: seen by PAULA Montez NP/ Troy Gomez MD - w/u in  progress RE SLE: presented in 2020 - followed by João Mejia, on Plaquenil and azathioprine. Overdue for ophtho - referral provided.  RE LN: bx c/w class 3, followed by Sarah Wilson, Ellis 2.3 prior to tx, most recent 1.4 RE HTN: followed by Robyn Ashraf NP - on amlodipine and labetalol. most recent bp 112/68  Now that she is getting closer to delivery date, has questions about labor, delivery, nursing - discussed at length. She needs resources - Will research on her behalf - review at next visit.   EKG unremarkable  Annual alcohol screen completed this visit.Currently pregnant - not drinking any ETOH.  Healthy drinking guidelines shared with patient (Male no more than 4 SSD on any day, no more than 14 SSD per week; Female and male overage 65 no more than 3 SSD on any day, no more than 7 per week). Positive reinforcement provided given patient currently within healthy guidelines.   Annual depression screen completed this visit and reviewed.  Screening not suggestive of diagnosis of MDD.

## 2024-05-17 NOTE — HEALTH RISK ASSESSMENT
[Never (0 pts)] : Never (0 points) [No falls in past year] : Patient reported no falls in the past year [0] : 2) Feeling down, depressed, or hopeless: Not at all (0) [No Retinopathy] : No retinopathy [Patient reported PAP Smear was normal] : Patient reported PAP Smear was normal [HIV test declined] : HIV test declined [Hepatitis C test declined] : Hepatitis C test declined [None] : None [With Family] : lives with family [] :  [Sexually Active] : sexually active [Feels Safe at Home] : Feels safe at home [Fully functional (bathing, dressing, toileting, transferring, walking, feeding)] : Fully functional (bathing, dressing, toileting, transferring, walking, feeding) [Fully functional (using the telephone, shopping, preparing meals, housekeeping, doing laundry, using] : Fully functional and needs no help or supervision to perform IADLs (using the telephone, shopping, preparing meals, housekeeping, doing laundry, using transportation, managing medications and managing finances) [Smoke Detector] : smoke detector [Carbon Monoxide Detector] : carbon monoxide detector [Seat Belt] :  uses seat belt [With Patient/Caregiver] : , with patient/caregiver [Name: ___] : Health Care Proxy's Name: [unfilled]  [de-identified] : pre-preg - rare - now during preg - no ETOH at all [Audit-CScore] : 0 [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  [de-identified] : vegetarian - for protein - nut , cheese , beans, no fish, rare eggs [LDN1Eejkt] : 0 [EyeExamDate] : 04/21 [Change in mental status noted] : No change in mental status noted [Reports changes in hearing] : Reports no changes in hearing [Reports changes in vision] : Reports no changes in vision [Guns at Home] : no guns at home [MammogramComments] : na [PapSmearDate] : 02/22 [BoneDensityComments] : na [ColonoscopyComments] : na - no fam hx [FreeTextEntry3] : first preg [AdvancecareDate] : 10/22

## 2024-05-17 NOTE — ASSESSMENT
[FreeTextEntry1] : 30 yo F with h/o SLE/lupus nephritis class 3, HTN, currently ~ 26 wks pregnant, recently seen by Heme for anemia   RE preg: being managed by Bertha Frey (maternal fetal medicine).  RE anemia: seen by PAULA Montez NP/ Troy Gomez MD - w/u in  progress RE SLE: presented in 2020 - followed by João Mejia, on Plaquenil and azathioprine. Overdue for ophtho - referral provided.  RE LN: bx c/w class 3, followed by Sarah Wilson, Ellis 2.3 prior to tx, most recent 1.4 RE HTN: followed by Robyn Ashraf NP - on amlodipine and labetalol. most recent bp 112/68  Now that she is getting closer to delivery date, has questions about labor, delivery, nursing - discussed at length. She needs resources - Will research on her behalf - review at next visit.   EKG unremarkable  Annual alcohol screen completed this visit.Currently pregnant - not drinking any ETOH.  Healthy drinking guidelines shared with patient (Male no more than 4 SSD on any day, no more than 14 SSD per week; Female and male overage 65 no more than 3 SSD on any day, no more than 7 per week). Positive reinforcement provided given patient currently within healthy guidelines.   Annual depression screen completed this visit and reviewed.  Screening not suggestive of diagnosis of MDD.

## 2024-05-17 NOTE — HISTORY OF PRESENT ILLNESS
[FreeTextEntry1] : New patient here to [facility]  [de-identified] : Ms. PIA PAZ is a 33 year old   female  with history of SLE/lupus nephritis class 3, HTN, anemia presented today for comprehensive evaluation.  ------ RE preg: being managed by Bertha Frey (maternal fetal medicine).  RE anemia: seen by PAULA Montez NP/ Troy Gomez MD - w/u in  progress RE SLE: presented in 2020 - followed by João Mejia, on Plaquenil and azathioprine.  RE LN: bx c/w class 3, followed by Sarah Wilson, Cr 2.3 prior to tx, most recent 1.4 RE HTN: followed by Robyn Ashraf NP - on amlodipine and labetalol. most recent bp 112/68  Now that she is getting closer to delivery date, has questions about labor, delivery, nursing.

## 2024-05-21 ENCOUNTER — APPOINTMENT (OUTPATIENT)
Dept: INTERNAL MEDICINE | Facility: CLINIC | Age: 34
End: 2024-05-21

## 2024-05-23 ENCOUNTER — NON-APPOINTMENT (OUTPATIENT)
Age: 34
End: 2024-05-23

## 2024-06-03 ENCOUNTER — NON-APPOINTMENT (OUTPATIENT)
Age: 34
End: 2024-06-03

## 2024-06-03 DIAGNOSIS — N18.30 CHRONIC KIDNEY DISEASE, STAGE 3 UNSPECIFIED: ICD-10-CM

## 2024-06-03 LAB
25(OH)D3 SERPL-MCNC: 39.1 NG/ML
ALBUMIN SERPL ELPH-MCNC: 4.3 G/DL
ALBUMIN SERPL ELPH-MCNC: 4.3 G/DL
ANION GAP SERPL CALC-SCNC: 11 MMOL/L
ANION GAP SERPL CALC-SCNC: 14 MMOL/L
APPEARANCE: CLEAR
APPEARANCE: CLEAR
BACTERIA: NEGATIVE /HPF
BACTERIA: NEGATIVE /HPF
BILIRUBIN URINE: NEGATIVE
BILIRUBIN URINE: NEGATIVE
BLOOD URINE: ABNORMAL
BLOOD URINE: ABNORMAL
BUN SERPL-MCNC: 34 MG/DL
BUN SERPL-MCNC: 40 MG/DL
C3 SERPL-MCNC: 129 MG/DL
C3 SERPL-MCNC: 132 MG/DL
C4 SERPL-MCNC: 30 MG/DL
C4 SERPL-MCNC: 31 MG/DL
CALCIUM SERPL-MCNC: 9.1 MG/DL
CALCIUM SERPL-MCNC: 9.6 MG/DL
CAST: 0 /LPF
CAST: 0 /LPF
CHLORIDE SERPL-SCNC: 103 MMOL/L
CHLORIDE SERPL-SCNC: 105 MMOL/L
CO2 SERPL-SCNC: 18 MMOL/L
CO2 SERPL-SCNC: 22 MMOL/L
COLOR: ABNORMAL
COLOR: YELLOW
CREAT SERPL-MCNC: 2.02 MG/DL
CREAT SERPL-MCNC: 2.05 MG/DL
CREAT SPEC-SCNC: 16 MG/DL
CREAT SPEC-SCNC: 52 MG/DL
CREAT/PROT UR: 0.5 RATIO
CREAT/PROT UR: 0.6 RATIO
DSDNA AB SER-ACNC: 1 IU/ML
DSDNA AB SER-ACNC: 1 IU/ML
EGFR: 33 ML/MIN/1.73M2
EGFR: 33 ML/MIN/1.73M2
EPITHELIAL CELLS: 0 /HPF
EPITHELIAL CELLS: 2 /HPF
FERRITIN SERPL-MCNC: 25 NG/ML
GLUCOSE QUALITATIVE U: NEGATIVE MG/DL
GLUCOSE QUALITATIVE U: NEGATIVE MG/DL
GLUCOSE SERPL-MCNC: 114 MG/DL
GLUCOSE SERPL-MCNC: 99 MG/DL
HAPTOGLOB SERPL-MCNC: <20 MG/DL
HAPTOGLOB SERPL-MCNC: <20 MG/DL
HCT VFR BLD CALC: 34.5 %
HCT VFR BLD CALC: 35 %
HGB BLD-MCNC: 11 G/DL
HGB BLD-MCNC: 11.7 G/DL
IRON SATN MFR SERPL: 18 %
IRON SERPL-MCNC: 56 UG/DL
KETONES URINE: NEGATIVE MG/DL
KETONES URINE: NEGATIVE MG/DL
LDH SERPL-CCNC: 255 U/L
LDH SERPL-CCNC: 257 U/L
LEUKOCYTE ESTERASE URINE: NEGATIVE
LEUKOCYTE ESTERASE URINE: NEGATIVE
MCHC RBC-ENTMCNC: 27.8 PG
MCHC RBC-ENTMCNC: 28.1 PG
MCHC RBC-ENTMCNC: 31.9 GM/DL
MCHC RBC-ENTMCNC: 33.4 GM/DL
MCV RBC AUTO: 84.1 FL
MCV RBC AUTO: 87.1 FL
MICROSCOPIC-UA: NORMAL
MICROSCOPIC-UA: NORMAL
NITRITE URINE: NEGATIVE
NITRITE URINE: NEGATIVE
PH URINE: 6
PH URINE: 6
PHOSPHATE SERPL-MCNC: 3.8 MG/DL
PHOSPHATE SERPL-MCNC: 4.6 MG/DL
PLATELET # BLD AUTO: 230 K/UL
PLATELET # BLD AUTO: 231 K/UL
POTASSIUM SERPL-SCNC: 4.7 MMOL/L
POTASSIUM SERPL-SCNC: 4.9 MMOL/L
PROT UR-MCNC: 10 MG/DL
PROT UR-MCNC: 24 MG/DL
PROTEIN URINE: 30 MG/DL
PROTEIN URINE: NEGATIVE MG/DL
RBC # BLD: 3.96 M/UL
RBC # BLD: 4.16 M/UL
RBC # FLD: 13.6 %
RBC # FLD: 14.3 %
RED BLOOD CELLS URINE: 266 /HPF
RED BLOOD CELLS URINE: NORMAL /HPF
REVIEW: NORMAL
SODIUM SERPL-SCNC: 137 MMOL/L
SODIUM SERPL-SCNC: 137 MMOL/L
SPECIFIC GRAVITY URINE: 1
SPECIFIC GRAVITY URINE: 1.01
TIBC SERPL-MCNC: 304 UG/DL
UIBC SERPL-MCNC: 248 UG/DL
UROBILINOGEN URINE: 0.2 MG/DL
UROBILINOGEN URINE: 0.2 MG/DL
WBC # FLD AUTO: 4.16 K/UL
WBC # FLD AUTO: 4.63 K/UL
WHITE BLOOD CELLS URINE: 0 /HPF
WHITE BLOOD CELLS URINE: 1 /HPF

## 2024-08-13 ENCOUNTER — APPOINTMENT (OUTPATIENT)
Dept: NEPHROLOGY | Facility: CLINIC | Age: 34
End: 2024-08-13
Payer: COMMERCIAL

## 2024-08-13 VITALS
SYSTOLIC BLOOD PRESSURE: 121 MMHG | WEIGHT: 156.53 LBS | HEIGHT: 62 IN | DIASTOLIC BLOOD PRESSURE: 87 MMHG | OXYGEN SATURATION: 97 % | BODY MASS INDEX: 28.8 KG/M2 | HEART RATE: 82 BPM | TEMPERATURE: 97.9 F

## 2024-08-13 DIAGNOSIS — E87.5 HYPERKALEMIA: ICD-10-CM

## 2024-08-13 DIAGNOSIS — E87.20 ACIDOSIS, UNSPECIFIED: ICD-10-CM

## 2024-08-13 DIAGNOSIS — I10 ESSENTIAL (PRIMARY) HYPERTENSION: ICD-10-CM

## 2024-08-13 PROCEDURE — 99214 OFFICE O/P EST MOD 30 MIN: CPT

## 2024-08-13 RX ORDER — LOSARTAN POTASSIUM 25 MG/1
25 TABLET, FILM COATED ORAL
Qty: 30 | Refills: 3 | Status: ACTIVE | COMMUNITY
Start: 2024-08-13 | End: 1900-01-01

## 2024-08-13 NOTE — ASSESSMENT
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 She was also started on tacro and MMF. Tacrolimus was 3/3 tapered and she was weaned off of it summer 2021 due to concern for 'interstitial nephritis.' Was on Mycophenolate 2 tabs bid which she didn't tolerate due to GI side effects, and was changed to Myfortic with which she still had diarrhea, vomiting; Then transitioned 3 mos later to myfortic 320mg bid which she didn't tolerate and it was switched to Imuran in March 2021 to get pregnant Renal Biopsy Oct 2023: insuffient sample on biopsy; 50-83% GS; mild-mod ifta; no overt activity IF: neg; EM no significant glomeruli- --prepregnancy cr 1.6 lauryn; -postpartum now 1.9-2.0, off IS - Imuran on hold since July admission 2023; also no evidence of activity on biopsy -last cr was stable in July but possible uptrending proteinuria from 600mg to 900mg -plan to repeat ua, prot/cr today and start ARB for SANDY inhibition -cont Plaquenil 200 qD- but takes it only 2-3x/week  -start ARB- Losartan 25mg daily sent to [facility] -consider farxiga in future  #Non nephrotic range proteinuria- june 2020 prot/cr 1.5g dec 29 2020 prot.cr 0.1 june 2022 0.1 postpartum: Jan 0.8 prot/cr Feb 0.7 prot/cr July 0.2 prot/cr Aug 0.5 prot/cr March 2024 prot/cr 0.6;   june 2024 prot/cr0.9  check prot/cr today  #met acidosis- on bicarb tabs 650mg tid;  1-2 tid  #hyperkalemia- low k diet; monitor k trend on ace #SLE-Pt has had negative lupus serologies in the past & lupus serologies negative during pregnancy; on plaquenil; advised follow up with Dr Forrest #HTN- on Amlodipine 10mg daily;start Losartan 25mg daily; limited with ACE due to dry cough and hyperK. repeat k/cr in two weeks after starting ARB  #Anemia-stable;  hapto remains low #serology sent at dr forrest office July 2022- mild phosphotidylserine + and + silica clotting but neg DRVVT h/o high PTT

## 2024-08-13 NOTE — PHYSICAL EXAM
[General Appearance - Alert] : alert [General Appearance - In No Acute Distress] : in no acute distress [General Appearance - Well Nourished] : well nourished [General Appearance - Well Developed] : well developed [Sclera] : the sclera and conjunctiva were normal [Outer Ear] : the ears and nose were normal in appearance [Neck Appearance] : the appearance of the neck was normal [Respiration, Rhythm And Depth] : normal respiratory rhythm and effort [Auscultation Breath Sounds / Voice Sounds] : lungs were clear to auscultation bilaterally [Apical Impulse] : the apical impulse was normal [Heart Sounds] : normal S1 and S2 [Heart Rate And Rhythm] : heart rate was normal and rhythm regular [Edema] : there was no peripheral edema [Bowel Sounds] : normal bowel sounds [Abdomen Soft] : soft [Abdomen Tenderness] : non-tender [No CVA Tenderness] : no ~M costovertebral angle tenderness [Abnormal Walk] : normal gait [Skin Color & Pigmentation] : normal skin color and pigmentation [Skin Turgor] : normal skin turgor [] : no rash [No Focal Deficits] : no focal deficits [Oriented To Time, Place, And Person] : oriented to person, place, and time [Impaired Insight] : insight and judgment were intact [Affect] : the affect was normal [FreeTextEntry1] :  1+ edema to midshin  1+ edema to midshin 1+ edema midshin b/l

## 2024-08-13 NOTE — HISTORY OF PRESENT ILLNESS
[FreeTextEntry1] : Here for follow up  No SOB States fatigue, chronically States on/off frothy urine Denies leg swelling Other ROS neg  Has Rheum appnt in Sept and DR Simms in two weeks Bp at home 130/90s   Meds: Sodium bicarb tid 1-2 tabs- takes 3-5 tablets daily [id number] daily Famotidine 20mg daily Plaquenil on/off 2-3x/week No Lisinopril bc of cough/hyperK -  since april

## 2024-08-16 DIAGNOSIS — N18.30 CHRONIC KIDNEY DISEASE, STAGE 3 UNSPECIFIED: ICD-10-CM

## 2024-08-16 LAB
25(OH)D3 SERPL-MCNC: 21.6 NG/ML
ALBUMIN SERPL ELPH-MCNC: 4.2 G/DL
ANION GAP SERPL CALC-SCNC: 14 MMOL/L
APPEARANCE: CLEAR
BACTERIA: NEGATIVE /HPF
BILIRUBIN URINE: NEGATIVE
BLOOD URINE: NEGATIVE
BUN SERPL-MCNC: 28 MG/DL
C3 SERPL-MCNC: 132 MG/DL
C4 SERPL-MCNC: 32 MG/DL
CALCIUM SERPL-MCNC: 9.3 MG/DL
CAST: 0 /LPF
CHLORIDE SERPL-SCNC: 105 MMOL/L
CO2 SERPL-SCNC: 20 MMOL/L
COLOR: YELLOW
CREAT SERPL-MCNC: 1.96 MG/DL
CREAT SPEC-SCNC: 92 MG/DL
CREAT/PROT UR: 0.8 RATIO
DSDNA AB SER-ACNC: 1 IU/ML
EGFR: 34 ML/MIN/1.73M2
EPITHELIAL CELLS: 3 /HPF
ESTIMATED AVERAGE GLUCOSE: 91 MG/DL
FERRITIN SERPL-MCNC: 19 NG/ML
GLUCOSE QUALITATIVE U: NEGATIVE MG/DL
GLUCOSE SERPL-MCNC: 104 MG/DL
HAPTOGLOB SERPL-MCNC: 49 MG/DL
HBA1C MFR BLD HPLC: 4.8 %
HCT VFR BLD CALC: 37 %
HGB BLD-MCNC: 11.8 G/DL
IRON SATN MFR SERPL: 21 %
IRON SERPL-MCNC: 70 UG/DL
KETONES URINE: NEGATIVE MG/DL
LDH SERPL-CCNC: 256 U/L
LEUKOCYTE ESTERASE URINE: NEGATIVE
MCHC RBC-ENTMCNC: 26.9 PG
MCHC RBC-ENTMCNC: 31.9 GM/DL
MCV RBC AUTO: 84.5 FL
MICROSCOPIC-UA: NORMAL
NITRITE URINE: NEGATIVE
PH URINE: 6
PHOSPHATE SERPL-MCNC: 3.4 MG/DL
PLATELET # BLD AUTO: 253 K/UL
POTASSIUM SERPL-SCNC: 4.4 MMOL/L
PROT UR-MCNC: 71 MG/DL
PROTEIN URINE: 100 MG/DL
RBC # BLD: 4.38 M/UL
RBC # FLD: 13.4 %
RED BLOOD CELLS URINE: 1 /HPF
SODIUM SERPL-SCNC: 140 MMOL/L
SPECIFIC GRAVITY URINE: 1.01
TIBC SERPL-MCNC: 336 UG/DL
UIBC SERPL-MCNC: 266 UG/DL
UROBILINOGEN URINE: 0.2 MG/DL
WBC # FLD AUTO: 5.19 K/UL
WHITE BLOOD CELLS URINE: 0 /HPF

## 2024-08-19 ENCOUNTER — RX RENEWAL (OUTPATIENT)
Age: 34
End: 2024-08-19

## 2024-08-28 ENCOUNTER — APPOINTMENT (OUTPATIENT)
Dept: INTERNAL MEDICINE | Facility: CLINIC | Age: 34
End: 2024-08-28
Payer: COMMERCIAL

## 2024-08-28 DIAGNOSIS — M54.2 CERVICALGIA: ICD-10-CM

## 2024-08-28 DIAGNOSIS — N18.30 CHRONIC KIDNEY DISEASE, STAGE 3 UNSPECIFIED: ICD-10-CM

## 2024-08-28 DIAGNOSIS — M32.14 GLOMERULAR DISEASE IN SYSTEMIC LUPUS ERYTHEMATOSUS: ICD-10-CM

## 2024-08-28 DIAGNOSIS — R63.5 ABNORMAL WEIGHT GAIN: ICD-10-CM

## 2024-08-28 DIAGNOSIS — M32.9 SYSTEMIC LUPUS ERYTHEMATOSUS, UNSPECIFIED: ICD-10-CM

## 2024-08-28 DIAGNOSIS — I10 ESSENTIAL (PRIMARY) HYPERTENSION: ICD-10-CM

## 2024-08-28 PROCEDURE — 99214 OFFICE O/P EST MOD 30 MIN: CPT | Mod: 95

## 2024-09-02 PROBLEM — M54.2 POSTERIOR NECK PAIN: Status: ACTIVE | Noted: 2024-09-02

## 2024-09-02 PROBLEM — R63.5 WEIGHT GAIN: Status: ACTIVE | Noted: 2024-09-02

## 2024-09-03 ENCOUNTER — NON-APPOINTMENT (OUTPATIENT)
Age: 34
End: 2024-09-03

## 2024-09-04 ENCOUNTER — TRANSCRIPTION ENCOUNTER (OUTPATIENT)
Age: 34
End: 2024-09-04

## 2024-09-04 NOTE — ADDENDUM
[FreeTextEntry1] : 36 minutes spent in preparation of this visit, including review of previous notes and test results, interview and examination of patient, discussion of plan, arranging for appropriate testing and treatment, and documentation.   9/4/24 Addm: Contact information provided for medical weight management division.  [phone]

## 2024-09-04 NOTE — ADDENDUM
[FreeTextEntry1] : 36 minutes spent in preparation of this visit, including review of previous notes and test results, interview and examination of patient, discussion of plan, arranging for appropriate testing and treatment, and documentation.   9/4/24 Addm: Contact information provided for medical weight management division.  [phone]

## 2024-09-04 NOTE — HISTORY OF PRESENT ILLNESS
[Home] : at home, [unfilled] , at the time of the visit. [Medical Office: ([facility])___] : at the medical office located in  [Verbal consent obtained from patient] : the patient, [unfilled] [de-identified] : 32 yo F with h/o SLE/lupus nephritis class III, hypertension, anemia  today, she has 3 issues she would like to address.  1. RE CKD: reports her Cr has stabilized ~ 2.  Has been on lisinopril which resulted in hyperkalemia.  Currently on Nabicarb TID.  Also having proteinuria. She has been discussing this with Dr Wilson - she has gained some weight since her son was born (wt 130 -> 156) and was advised that wt loss would likely help improve her kidney dz/proteinuria. Dr LESLIE suggested she consider starting on Ozempic to help her with the wt loss she thinks will help improve her renal dz.    2. 2nd issue is fatigue. she is walking 10.000 steps daily so getting good exercise regardless.  3. Last is cervical radiculopathy. she is requesting PT  Prior 2/28/23 Stopped breast-feeding in July Status post hospitalization in July for mono, strep, sepsis, pancytopenia (likely due to EBV), elevated LFTs, Imuran on hold since that time.   Since discharge, has seen rheumatology and nephrology.   - notes reviewed now on low potassium diet, Lokelma, and Lasix to control hyperkalemia.  No other changes in meds.

## 2024-09-04 NOTE — ASSESSMENT
[FreeTextEntry1] : 30 yo F with h/o SLE/lupus nephritis class III, hypertension, anemia Chart reviewed today in preparation for visit.  today, she has 3 issues she would like to address.   RE CKD: reports her Cr has stabilized ~ 2.  Has been on lisinopril which resulted in hyperkalemia.  Currently on Nabicarb TID.  Also having proteinuria.  She has been discussing this with Dr Wilson - she has gained some weight since her son was born (wt 130 -> 156) and was advised that wt loss would likely help improve her kidney dz/proteinuria. Dr LESLIE suggested she consider starting on Ozempic to help her with the wt loss she thinks will help improve her renal dz.   RE Wt gain:  has gained 25 lbs compared to before she gave birth to her son. Options for wt loss discussed at length. She has been working on dietary modifications.  No h/o thyroid or pancreatic Ca, no h/o pancreatitis.  No contraindications.  She will reach out to her ins co to see if possible to get the medication be covered. Also discussed option of Zepbound as another possibilities.  Also discussed Wt management subdivision within [facility].  She is open to seeing one of our wt management MD's.  Will put through referral to Dr Foster Gutierrez.    2. 2nd issue is fatigue. she is walking 10.000 steps daily so getting good exercise regardless.  3. Last is cervical radiculopathy. she is requesting PT

## 2024-09-04 NOTE — HISTORY OF PRESENT ILLNESS
[Home] : at home, [unfilled] , at the time of the visit. [Medical Office: ([facility])___] : at the medical office located in  [Verbal consent obtained from patient] : the patient, [unfilled] [de-identified] : 30 yo F with h/o SLE/lupus nephritis class III, hypertension, anemia  today, she has 3 issues she would like to address.  1. RE CKD: reports her Cr has stabilized ~ 2.  Has been on lisinopril which resulted in hyperkalemia.  Currently on Nabicarb TID.  Also having proteinuria. She has been discussing this with Dr Wilson - she has gained some weight since her son was born (wt 130 -> 156) and was advised that wt loss would likely help improve her kidney dz/proteinuria. Dr LESLIE suggested she consider starting on Ozempic to help her with the wt loss she thinks will help improve her renal dz.    2. 2nd issue is fatigue. she is walking 10.000 steps daily so getting good exercise regardless.  3. Last is cervical radiculopathy. she is requesting PT  Prior 2/28/23 Stopped breast-feeding in July Status post hospitalization in July for mono, strep, sepsis, pancytopenia (likely due to EBV), elevated LFTs, Imuran on hold since that time.   Since discharge, has seen rheumatology and nephrology.   - notes reviewed now on low potassium diet, Lokelma, and Lasix to control hyperkalemia.  No other changes in meds.

## 2024-09-04 NOTE — ASSESSMENT
[FreeTextEntry1] : 32 yo F with h/o SLE/lupus nephritis class III, hypertension, anemia Chart reviewed today in preparation for visit.  today, she has 3 issues she would like to address.   RE CKD: reports her Cr has stabilized ~ 2.  Has been on lisinopril which resulted in hyperkalemia.  Currently on Nabicarb TID.  Also having proteinuria.  She has been discussing this with Dr Wilson - she has gained some weight since her son was born (wt 130 -> 156) and was advised that wt loss would likely help improve her kidney dz/proteinuria. Dr LESLIE suggested she consider starting on Ozempic to help her with the wt loss she thinks will help improve her renal dz.   RE Wt gain:  has gained 25 lbs compared to before she gave birth to her son. Options for wt loss discussed at length. She has been working on dietary modifications.  No h/o thyroid or pancreatic Ca, no h/o pancreatitis.  No contraindications.  She will reach out to her ins co to see if possible to get the medication be covered. Also discussed option of Zepbound as another possibilities.  Also discussed Wt management subdivision within [facility].  She is open to seeing one of our wt management MD's.  Will put through referral to Dr Foster Gutierrez.    2. 2nd issue is fatigue. she is walking 10.000 steps daily so getting good exercise regardless.  3. Last is cervical radiculopathy. she is requesting PT

## 2024-09-25 ENCOUNTER — APPOINTMENT (OUTPATIENT)
Dept: INTERNAL MEDICINE | Facility: CLINIC | Age: 34
End: 2024-09-25

## 2024-09-25 VITALS
WEIGHT: 154.38 LBS | HEART RATE: 83 BPM | OXYGEN SATURATION: 99 % | BODY MASS INDEX: 28.41 KG/M2 | DIASTOLIC BLOOD PRESSURE: 80 MMHG | HEIGHT: 62 IN | SYSTOLIC BLOOD PRESSURE: 112 MMHG

## 2024-09-25 DIAGNOSIS — K21.9 GASTRO-ESOPHAGEAL REFLUX DISEASE W/OUT ESOPHAGITIS: ICD-10-CM

## 2024-09-25 DIAGNOSIS — D22.9 MELANOCYTIC NEVI, UNSPECIFIED: ICD-10-CM

## 2024-09-25 DIAGNOSIS — M32.9 SYSTEMIC LUPUS ERYTHEMATOSUS, UNSPECIFIED: ICD-10-CM

## 2024-09-25 DIAGNOSIS — M79.89 OTHER SPECIFIED SOFT TISSUE DISORDERS: ICD-10-CM

## 2024-09-25 DIAGNOSIS — E66.3 OVERWEIGHT: ICD-10-CM

## 2024-09-25 DIAGNOSIS — R63.5 ABNORMAL WEIGHT GAIN: ICD-10-CM

## 2024-09-25 DIAGNOSIS — N18.30 CHRONIC KIDNEY DISEASE, STAGE 3 UNSPECIFIED: ICD-10-CM

## 2024-09-25 DIAGNOSIS — Z00.00 ENCOUNTER FOR GENERAL ADULT MEDICAL EXAMINATION W/OUT ABNORMAL FINDINGS: ICD-10-CM

## 2024-09-25 PROCEDURE — G0443: CPT

## 2024-09-25 PROCEDURE — G0444 DEPRESSION SCREEN ANNUAL: CPT

## 2024-09-25 PROCEDURE — 99395 PREV VISIT EST AGE 18-39: CPT

## 2024-09-25 PROCEDURE — 99213 OFFICE O/P EST LOW 20 MIN: CPT | Mod: 25

## 2024-09-29 PROBLEM — M79.89 SOFT TISSUE MASS: Status: ACTIVE | Noted: 2024-09-29

## 2024-09-29 PROBLEM — R63.5 WEIGHT GAIN: Noted: 2024-09-02

## 2024-09-29 PROBLEM — D22.9 MULTIPLE NEVI: Status: ACTIVE | Noted: 2024-09-25

## 2024-09-29 PROBLEM — E66.3 OVERWEIGHT WITH BODY MASS INDEX (BMI) OF 28 TO 28.9 IN ADULT: Status: ACTIVE | Noted: 2024-09-29

## 2024-09-29 RX ORDER — SEMAGLUTIDE 0.25 MG/.5ML
0.25 INJECTION, SOLUTION SUBCUTANEOUS
Qty: 1 | Refills: 3 | Status: ACTIVE | COMMUNITY
Start: 2024-09-29 | End: 1900-01-01

## 2024-09-29 NOTE — PHYSICAL EXAM
[Normal Sclera/Conjunctiva] : normal sclera/conjunctiva [PERRL] : pupils equal round and reactive to light [Clear to Auscultation] : lungs were clear to auscultation bilaterally [Normal] : normal rate, regular rhythm, normal S1 and S2 and no murmur heard [Soft] : abdomen soft [Non Tender] : non-tender [No Rash] : no rash [No Acute Distress] : no acute distress [Well-Appearing] : well-appearing [EOMI] : extraocular movements intact [No Respiratory Distress] : no respiratory distress  [No Lymphadenopathy] : no lymphadenopathy [Supple] : supple [Normal Rate] : normal rate  [Regular Rhythm] : with a regular rhythm [Normal S1, S2] : normal S1 and S2 [Pedal Pulses Present] : the pedal pulses are present [No Edema] : there was no peripheral edema [Normal Appearance] : normal in appearance [No Masses] : no palpable masses [No Axillary Lymphadenopathy] : no axillary lymphadenopathy [Normal Bowel Sounds] : normal bowel sounds [Normal Axillary Nodes] : no axillary lymphadenopathy [Normal Anterior Cervical Nodes] : no anterior cervical lymphadenopathy [No CVA Tenderness] : no CVA  tenderness [No Spinal Tenderness] : no spinal tenderness [No Joint Swelling] : no joint swelling [Grossly Normal Strength/Tone] : grossly normal strength/tone [Coordination Grossly Intact] : coordination grossly intact [Deep Tendon Reflexes (DTR)] : deep tendon reflexes were 2+ and symmetric [Normal Affect] : the affect was normal [de-identified] : several nevi. ~ 2.5 soft tissue nodule Left lower back around waistline

## 2024-09-29 NOTE — ASSESSMENT
[FreeTextEntry1] : 32 yo F with h/o SLE/lupus nephritis class III, hypertension, anemia - here for annual comprehensive visit.  Chart reviewed today in preparation for visit.   RE BMI 28.4: last visit, discussed wt gain since the topic was raised by her nephrologist who explained to her that wt loss is likely to improve her renal function. Since her efforts have had limited effect, her nephrologist suggested trying a GLP1, specifically semaglutide. Although she does not meet standard criteria, will try prescribing and see if her insurance company can be petitioned to cover the medication.  Counseled Ms. Mclaughlin that there is a possibility use of semaglutide may exacerbate the reflux symptoms associated with regurgitation that she is already having (see below).  She expresses understanding of the risk but would like to proceed with trial of GLP-1 to see how she does.   In addition, referred to [facility] weight management for further assistance.  In the event that GLP-1 is not tolerated and or her insurance will not cover, and to assist with diet support, referring to Dr. Presley Gutierrez, weight management.  RE GERD: chronic sx with associated regurgitation Reports regurgitation a major issue - almost always happens after dinner - burps food back up.  happens at other meals as well - depends on what she eats.  last saw GI 2021. dx with gastritis by EGD. h pylori neg. Needs evaluation - referring to GI - Dr Sultan CALLE insomnia: sleep is a major issue - doesnt fall asleep until 2 am - up at 8 or 9 am.  Hard to get out of bed for most of the morning. son sleeping mostly through the night Also having issues with fatigue. Just sleep hygiene.  CBTi  in reserve  I spent 5 minutes conducting an annual depression screen using the PHQ 9 and discussing results with the patient during this visit.  Screening suggestive of diagnosis of moderate depression, although score skewed by sleep and eating issues.  Not currently taking antidepressant but is seeing a therapist, Chauncey Velazquez weekly.  She feels the sessions are very helpful and is committed to continuing with her current treatment.   Annual alcohol screen completed this visit using AUDIT C screening tool and results discussed with patient.  Alcohol use within healthy limits.  Healthy drinking guidelines shared with patient (Male no more than 4 SSD on any day, no more than 14 SSD per week; Female and male overage 65 no more than 3 SSD on any day, no more than 7 per week). Positive reinforcement provided given patient currently within healthy guidelines. Time spent: 5 minutes   Due for flu shot and COVID booster.  She will get at pharmacy.   f/u 3 months

## 2024-09-29 NOTE — HISTORY OF PRESENT ILLNESS
[FreeTextEntry1] : annual CPE [de-identified] : 32 yo F with h/o SLE/lupus nephritis class III, hypertension, anemia Chart reviewed today in preparation for visit.   Last visit, discussed wt gain, the topic was especially raised by her nephrologist who explained to her that wt loss is likely to improve her renal function.  sleep is a major issue - doesnt fall asleep untiol 2 am - up at 8 or 9 am.  Hard to get out of bed for most of the morning. son sleeping mostly through the night Also having issues with fatigue. RE GERD: regurgitation a major issue - always happens after dinner.  burps it back up.  happens at other meals as well - depends on what she eats.  last saw GI 2021. dx with gastritis by EGD. h pylori neg Not currently on any meds for RA - flare free for 6 months.   Prior 8/28/24 today, she has 3 issues she would like to address.   RE CKD: reports her Cr has stabilized ~ 2.  Has been on lisinopril which resulted in hyperkalemia.  Currently on Nabicarb TID.  Also having proteinuria.  She has been discussing this with Dr Wilson - she has gained some weight since her son was born (wt 130 -> 156) and was advised that wt loss would likely help improve her kidney dz/proteinuria. Dr LESLIE suggested she consider starting on Ozempic to help her with the wt loss she thinks will help improve her renal dz.   RE Wt gain:  has gained 25 lbs compared to before she gave birth to her son. Options for wt loss discussed at length. She has been working on dietary modifications.  No h/o thyroid or pancreatic Ca, no h/o pancreatitis.  No contraindications.  She will reach out to her ins co to see if possible to get the medication be covered. Also discussed option of Zepbound as another possibilities.  Also discussed Wt management subdivision within [facility].  She is open to seeing one of our wt management MD's.  Will put through referral to Dr Foster Gutierrez.    2. 2nd issue is fatigue. she is walking 10.000 steps daily so getting good exercise regardless.  3. Last is cervical radiculopathy. she is requesting PT

## 2024-09-29 NOTE — HISTORY OF PRESENT ILLNESS
[FreeTextEntry1] : annual CPE [de-identified] : 30 yo F with h/o SLE/lupus nephritis class III, hypertension, anemia Chart reviewed today in preparation for visit.   Last visit, discussed wt gain, the topic was especially raised by her nephrologist who explained to her that wt loss is likely to improve her renal function.  sleep is a major issue - doesnt fall asleep untiol 2 am - up at 8 or 9 am.  Hard to get out of bed for most of the morning. son sleeping mostly through the night Also having issues with fatigue. RE GERD: regurgitation a major issue - always happens after dinner.  burps it back up.  happens at other meals as well - depends on what she eats.  last saw GI 2021. dx with gastritis by EGD. h pylori neg Not currently on any meds for RA - flare free for 6 months.   Prior 8/28/24 today, she has 3 issues she would like to address.   RE CKD: reports her Cr has stabilized ~ 2.  Has been on lisinopril which resulted in hyperkalemia.  Currently on Nabicarb TID.  Also having proteinuria.  She has been discussing this with Dr Wilson - she has gained some weight since her son was born (wt 130 -> 156) and was advised that wt loss would likely help improve her kidney dz/proteinuria. Dr LESLIE suggested she consider starting on Ozempic to help her with the wt loss she thinks will help improve her renal dz.   RE Wt gain:  has gained 25 lbs compared to before she gave birth to her son. Options for wt loss discussed at length. She has been working on dietary modifications.  No h/o thyroid or pancreatic Ca, no h/o pancreatitis.  No contraindications.  She will reach out to her ins co to see if possible to get the medication be covered. Also discussed option of Zepbound as another possibilities.  Also discussed Wt management subdivision within [facility].  She is open to seeing one of our wt management MD's.  Will put through referral to Dr Foster Gutierrez.    2. 2nd issue is fatigue. she is walking 10.000 steps daily so getting good exercise regardless.  3. Last is cervical radiculopathy. she is requesting PT

## 2024-09-29 NOTE — PHYSICAL EXAM
[Normal Sclera/Conjunctiva] : normal sclera/conjunctiva [PERRL] : pupils equal round and reactive to light [Clear to Auscultation] : lungs were clear to auscultation bilaterally [Normal] : normal rate, regular rhythm, normal S1 and S2 and no murmur heard [Soft] : abdomen soft [Non Tender] : non-tender [No Rash] : no rash [No Acute Distress] : no acute distress [Well-Appearing] : well-appearing [EOMI] : extraocular movements intact [No Respiratory Distress] : no respiratory distress  [No Lymphadenopathy] : no lymphadenopathy [Supple] : supple [Normal Rate] : normal rate  [Regular Rhythm] : with a regular rhythm [Normal S1, S2] : normal S1 and S2 [Pedal Pulses Present] : the pedal pulses are present [No Edema] : there was no peripheral edema [Normal Appearance] : normal in appearance [No Masses] : no palpable masses [No Axillary Lymphadenopathy] : no axillary lymphadenopathy [Normal Bowel Sounds] : normal bowel sounds [Normal Axillary Nodes] : no axillary lymphadenopathy [Normal Anterior Cervical Nodes] : no anterior cervical lymphadenopathy [No CVA Tenderness] : no CVA  tenderness [No Spinal Tenderness] : no spinal tenderness [No Joint Swelling] : no joint swelling [Grossly Normal Strength/Tone] : grossly normal strength/tone [Coordination Grossly Intact] : coordination grossly intact [Deep Tendon Reflexes (DTR)] : deep tendon reflexes were 2+ and symmetric [Normal Affect] : the affect was normal [de-identified] : several nevi. ~ 2.5 soft tissue nodule Left lower back around waistline

## 2024-09-29 NOTE — HEALTH RISK ASSESSMENT
[0] : 2) Feeling down, depressed, or hopeless: Not at all (0) [de-identified] : pre-preg - rare - now during preg - no ETOH at all [[id number]] : 0 [FreeTextEntry3] : first preg [With Patient/Caregiver] : , with patient/caregiver [Name: ___] : Health Care Proxy's Name: [unfilled]  [Monthly or less (1 pt)] : Monthly or less (1 point) [1 or 2 (0 pts)] : 1 or 2 (0 points) [Never (0 pts)] : Never (0 points) [Yes] : Yes [Never] : Never [NO] : No [No Retinopathy] : No retinopathy [Patient reported PAP Smear was normal] : Patient reported PAP Smear was normal [None] : None [With Family] : lives with family [Employed] : employed [] :  [# Of Children ___] : has [unfilled] children [Feels Safe at Home] : Feels safe at home [Fully functional (bathing, dressing, toileting, transferring, walking, feeding)] : Fully functional (bathing, dressing, toileting, transferring, walking, feeding) [Fully functional (using the telephone, shopping, preparing meals, housekeeping, doing laundry, using] : Fully functional and needs no help or supervision to perform IADLs (using the telephone, shopping, preparing meals, housekeeping, doing laundry, using transportation, managing medications and managing finances) [Smoke Detector] : smoke detector [Carbon Monoxide Detector] : carbon monoxide detector [Seat Belt] :  uses seat belt [Designated Healthcare Proxy] : Designated healthcare proxy [Relationship: ___] : Relationship: [unfilled] [No falls in past year] : Patient reported no falls in the past year [Nearly Every Day (3)] : 5.) Poor appetite or overeating? Nearly every day [Several Days (1)] : 7.) Trouble concentrating on things, such as reading a newspaper or watching television? Several days [1/2 of Days or More (2)] : 8.) Moving or speaking so slowly that other people could have noticed, or the opposite, moving or speaking faster than usual? Half the days or more [Not at All (0)] : 9.) Thoughts that you would be off dead or of hurting yourself in some way? Not at all [Moderately Severe] : Severity of Depression is Moderately Severe [Not at all] : How difficult have these problems made it for you to do your work, take care of things at home, or get along with people? Not at all [PHQ-9 Positive] : PHQ-9 Positive [I have developed a follow-up plan documented below in the note.] : I have developed a follow-up plan documented below in the note. [Time Spent: ___ Minutes] : I spent [unfilled] minutes performing a depression screening for this patient. [HIV test declined] : HIV test declined [Hepatitis C test declined] : Hepatitis C test declined [Audit-CScore] : 1 [de-identified] : 10,000 steps a day [de-identified] : mostly carb diet - has to avoid too much protein due to CKD,  [[id number]] : 17 [EyeExamDate] : 10/23 [Change in mental status noted] : No change in mental status noted [Sexually Active] : not sexually active [Reports changes in hearing] : Reports no changes in hearing [Reports changes in vision] : Reports no changes in vision [MammogramComments] : NA [PapSmearDate] : 5/21 [BoneDensityComments] : NA [ColonoscopyComments] : NA [de-identified] : part time [AdvancecareDate] : 09/24

## 2024-09-29 NOTE — ASSESSMENT
[FreeTextEntry1] : 30 yo F with h/o SLE/lupus nephritis class III, hypertension, anemia - here for annual comprehensive visit.  Chart reviewed today in preparation for visit.   RE BMI 28.4: last visit, discussed wt gain since the topic was raised by her nephrologist who explained to her that wt loss is likely to improve her renal function. Since her efforts have had limited effect, her nephrologist suggested trying a GLP1, specifically semaglutide. Although she does not meet standard criteria, will try prescribing and see if her insurance company can be petitioned to cover the medication.  Counseled Ms. Mclaughlin that there is a possibility use of semaglutide may exacerbate the reflux symptoms associated with regurgitation that she is already having (see below).  She expresses understanding of the risk but would like to proceed with trial of GLP-1 to see how she does.   In addition, referred to [facility] weight [facility] for further assistance.  In the event that GLP-1 is not tolerated and or her insurance will not cover, and to assist with diet support, referring to Dr. Presley Gutierrez, weight management.  RE GERD: chronic sx with associated regurgitation Reports regurgitation a major issue - almost always happens after dinner - burps food back up.  happens at other meals as well - depends on what she eats.  last saw GI 2021. dx with gastritis by EGD. h pylori neg. Needs evaluation - referring to GI - Dr Sultan CALLE insomnia: sleep is a major issue - doesnt fall asleep until 2 am - up at 8 or 9 am.  Hard to get out of bed for most of the morning. son sleeping mostly through the night Also having issues with fatigue. Just sleep hygiene.  CBTi  in reserve  I spent 5 minutes conducting an annual depression screen using the PHQ 9 and discussing results with the patient during this visit.  Screening suggestive of diagnosis of moderate depression, although score skewed by sleep and eating issues.  Not currently taking antidepressant but is seeing a therapist, Chauncey Velazquez weekly.  She feels the sessions are very helpful and is committed to continuing with her current treatment.   Annual alcohol screen completed this visit using AUDIT C screening tool and results discussed with patient.  Alcohol use within healthy limits.  Healthy drinking guidelines shared with patient (Male no more than 4 SSD on any day, no more than 14 SSD per week; Female and male overage 65 no more than 3 SSD on any day, no more than 7 per week). Positive reinforcement provided given patient currently within healthy guidelines. Time spent: 5 minutes   Due for flu shot and COVID booster.  She will get at pharmacy.   f/u 3 months

## 2024-09-29 NOTE — HISTORY OF PRESENT ILLNESS
[FreeTextEntry1] : annual CPE [de-identified] : 32 yo F with h/o SLE/lupus nephritis class III, hypertension, anemia Chart reviewed today in preparation for visit.   Last visit, discussed wt gain, the topic was especially raised by her nephrologist who explained to her that wt loss is likely to improve her renal function.  sleep is a major issue - doesnt fall asleep untiol 2 am - up at 8 or 9 am.  Hard to get out of bed for most of the morning. son sleeping mostly through the night Also having issues with fatigue. RE GERD: regurgitation a major issue - always happens after dinner.  burps it back up.  happens at other meals as well - depends on what she eats.  last saw GI 2021. dx with gastritis by EGD. h pylori neg Not currently on any meds for RA - flare free for 6 months.   Prior 8/28/24 today, she has 3 issues she would like to address.   RE CKD: reports her Cr has stabilized ~ 2.  Has been on lisinopril which resulted in hyperkalemia.  Currently on Nabicarb TID.  Also having proteinuria.  She has been discussing this with Dr Wilson - she has gained some weight since her son was born (wt 130 -> 156) and was advised that wt loss would likely help improve her kidney dz/proteinuria. Dr LESLIE suggested she consider starting on Ozempic to help her with the wt loss she thinks will help improve her renal dz.   RE Wt gain:  has gained 25 lbs compared to before she gave birth to her son. Options for wt loss discussed at length. She has been working on dietary modifications.  No h/o thyroid or pancreatic Ca, no h/o pancreatitis.  No contraindications.  She will reach out to her ins co to see if possible to get the medication be covered. Also discussed option of Zepbound as another possibilities.  Also discussed Wt management subdivision within [facility].  She is open to seeing one of our wt management MD's.  Will put through referral to Dr Foster Gutierrez.    2. 2nd issue is fatigue. she is walking 10.000 steps daily so getting good exercise regardless.  3. Last is cervical radiculopathy. she is requesting PT

## 2024-09-29 NOTE — HEALTH RISK ASSESSMENT
[0] : 2) Feeling down, depressed, or hopeless: Not at all (0) [de-identified] : pre-preg - rare - now during preg - no ETOH at all [[id number]] : 0 [FreeTextEntry3] : first preg [With Patient/Caregiver] : , with patient/caregiver [Name: ___] : Health Care Proxy's Name: [unfilled]  [Monthly or less (1 pt)] : Monthly or less (1 point) [1 or 2 (0 pts)] : 1 or 2 (0 points) [Never (0 pts)] : Never (0 points) [Yes] : Yes [Never] : Never [NO] : No [No Retinopathy] : No retinopathy [Patient reported PAP Smear was normal] : Patient reported PAP Smear was normal [None] : None [With Family] : lives with family [Employed] : employed [] :  [# Of Children ___] : has [unfilled] children [Feels Safe at Home] : Feels safe at home [Fully functional (bathing, dressing, toileting, transferring, walking, feeding)] : Fully functional (bathing, dressing, toileting, transferring, walking, feeding) [Fully functional (using the telephone, shopping, preparing meals, housekeeping, doing laundry, using] : Fully functional and needs no help or supervision to perform IADLs (using the telephone, shopping, preparing meals, housekeeping, doing laundry, using transportation, managing medications and managing finances) [Smoke Detector] : smoke detector [Carbon Monoxide Detector] : carbon monoxide detector [Seat Belt] :  uses seat belt [Designated Healthcare Proxy] : Designated healthcare proxy [Relationship: ___] : Relationship: [unfilled] [No falls in past year] : Patient reported no falls in the past year [Nearly Every Day (3)] : 5.) Poor appetite or overeating? Nearly every day [Several Days (1)] : 7.) Trouble concentrating on things, such as reading a newspaper or watching television? Several days [1/2 of Days or More (2)] : 8.) Moving or speaking so slowly that other people could have noticed, or the opposite, moving or speaking faster than usual? Half the days or more [Not at All (0)] : 9.) Thoughts that you would be off dead or of hurting yourself in some way? Not at all [Moderately Severe] : Severity of Depression is Moderately Severe [Not at all] : How difficult have these problems made it for you to do your work, take care of things at home, or get along with people? Not at all [PHQ-9 Positive] : PHQ-9 Positive [I have developed a follow-up plan documented below in the note.] : I have developed a follow-up plan documented below in the note. [Time Spent: ___ Minutes] : I spent [unfilled] minutes performing a depression screening for this patient. [HIV test declined] : HIV test declined [Hepatitis C test declined] : Hepatitis C test declined [de-identified] : 10,000 steps a day [Audit-CScore] : 1 [de-identified] : mostly carb diet - has to avoid too much protein due to CKD,  [[id number]] : 17 [EyeExamDate] : 10/23 [Change in mental status noted] : No change in mental status noted [Sexually Active] : not sexually active [Reports changes in hearing] : Reports no changes in hearing [Reports changes in vision] : Reports no changes in vision [MammogramComments] : NA [PapSmearDate] : 5/21 [BoneDensityComments] : NA [ColonoscopyComments] : NA [de-identified] : part time [AdvancecareDate] : 09/24

## 2024-09-29 NOTE — PHYSICAL EXAM
[Normal Sclera/Conjunctiva] : normal sclera/conjunctiva [PERRL] : pupils equal round and reactive to light [Clear to Auscultation] : lungs were clear to auscultation bilaterally [Normal] : normal rate, regular rhythm, normal S1 and S2 and no murmur heard [Soft] : abdomen soft [Non Tender] : non-tender [No Rash] : no rash [No Acute Distress] : no acute distress [Well-Appearing] : well-appearing [EOMI] : extraocular movements intact [No Respiratory Distress] : no respiratory distress  [No Lymphadenopathy] : no lymphadenopathy [Supple] : supple [Normal Rate] : normal rate  [Regular Rhythm] : with a regular rhythm [Normal S1, S2] : normal S1 and S2 [Pedal Pulses Present] : the pedal pulses are present [No Edema] : there was no peripheral edema [Normal Appearance] : normal in appearance [No Masses] : no palpable masses [No Axillary Lymphadenopathy] : no axillary lymphadenopathy [Normal Bowel Sounds] : normal bowel sounds [Normal Axillary Nodes] : no axillary lymphadenopathy [Normal Anterior Cervical Nodes] : no anterior cervical lymphadenopathy [No CVA Tenderness] : no CVA  tenderness [No Spinal Tenderness] : no spinal tenderness [No Joint Swelling] : no joint swelling [Grossly Normal Strength/Tone] : grossly normal strength/tone [Coordination Grossly Intact] : coordination grossly intact [Deep Tendon Reflexes (DTR)] : deep tendon reflexes were 2+ and symmetric [Normal Affect] : the affect was normal [de-identified] : several nevi. ~ 2.5 soft tissue nodule Left lower back around waistline

## 2024-09-29 NOTE — HISTORY OF PRESENT ILLNESS
[FreeTextEntry1] : annual CPE [de-identified] : 32 yo F with h/o SLE/lupus nephritis class III, hypertension, anemia Chart reviewed today in preparation for visit.   Last visit, discussed wt gain, the topic was especially raised by her nephrologist who explained to her that wt loss is likely to improve her renal function.  sleep is a major issue - doesnt fall asleep untiol 2 am - up at 8 or 9 am.  Hard to get out of bed for most of the morning. son sleeping mostly through the night Also having issues with fatigue. RE GERD: regurgitation a major issue - always happens after dinner.  burps it back up.  happens at other meals as well - depends on what she eats.  last saw GI 2021. dx with gastritis by EGD. h pylori neg Not currently on any meds for RA - flare free for 6 months.   Prior 8/28/24 today, she has 3 issues she would like to address.   RE CKD: reports her Cr has stabilized ~ 2.  Has been on lisinopril which resulted in hyperkalemia.  Currently on Nabicarb TID.  Also having proteinuria.  She has been discussing this with Dr Wilson - she has gained some weight since her son was born (wt 130 -> 156) and was advised that wt loss would likely help improve her kidney dz/proteinuria. Dr LESLIE suggested she consider starting on Ozempic to help her with the wt loss she thinks will help improve her renal dz.   RE Wt gain:  has gained 25 lbs compared to before she gave birth to her son. Options for wt loss discussed at length. She has been working on dietary modifications.  No h/o thyroid or pancreatic Ca, no h/o pancreatitis.  No contraindications.  She will reach out to her ins co to see if possible to get the medication be covered. Also discussed option of Zepbound as another possibilities.  Also discussed Wt management subdivision within [facility].  She is open to seeing one of our wt management MD's.  Will put through referral to Dr Foster Gutierrez.    2. 2nd issue is fatigue. she is walking 10.000 steps daily so getting good exercise regardless.  3. Last is cervical radiculopathy. she is requesting PT

## 2024-09-29 NOTE — HEALTH RISK ASSESSMENT
[0] : 2) Feeling down, depressed, or hopeless: Not at all (0) [de-identified] : pre-preg - rare - now during preg - no ETOH at all [[id number]] : 0 [FreeTextEntry3] : first preg [With Patient/Caregiver] : , with patient/caregiver [Name: ___] : Health Care Proxy's Name: [unfilled]  [Monthly or less (1 pt)] : Monthly or less (1 point) [1 or 2 (0 pts)] : 1 or 2 (0 points) [Never (0 pts)] : Never (0 points) [Yes] : Yes [Never] : Never [NO] : No [No Retinopathy] : No retinopathy [Patient reported PAP Smear was normal] : Patient reported PAP Smear was normal [None] : None [With Family] : lives with family [Employed] : employed [] :  [# Of Children ___] : has [unfilled] children [Feels Safe at Home] : Feels safe at home [Fully functional (bathing, dressing, toileting, transferring, walking, feeding)] : Fully functional (bathing, dressing, toileting, transferring, walking, feeding) [Fully functional (using the telephone, shopping, preparing meals, housekeeping, doing laundry, using] : Fully functional and needs no help or supervision to perform IADLs (using the telephone, shopping, preparing meals, housekeeping, doing laundry, using transportation, managing medications and managing finances) [Smoke Detector] : smoke detector [Carbon Monoxide Detector] : carbon monoxide detector [Seat Belt] :  uses seat belt [Designated Healthcare Proxy] : Designated healthcare proxy [Relationship: ___] : Relationship: [unfilled] [No falls in past year] : Patient reported no falls in the past year [Nearly Every Day (3)] : 5.) Poor appetite or overeating? Nearly every day [Several Days (1)] : 7.) Trouble concentrating on things, such as reading a newspaper or watching television? Several days [1/2 of Days or More (2)] : 8.) Moving or speaking so slowly that other people could have noticed, or the opposite, moving or speaking faster than usual? Half the days or more [Not at All (0)] : 9.) Thoughts that you would be off dead or of hurting yourself in some way? Not at all [Moderately Severe] : Severity of Depression is Moderately Severe [Not at all] : How difficult have these problems made it for you to do your work, take care of things at home, or get along with people? Not at all [PHQ-9 Positive] : PHQ-9 Positive [I have developed a follow-up plan documented below in the note.] : I have developed a follow-up plan documented below in the note. [Time Spent: ___ Minutes] : I spent [unfilled] minutes performing a depression screening for this patient. [HIV test declined] : HIV test declined [Hepatitis C test declined] : Hepatitis C test declined [Audit-CScore] : 1 [de-identified] : 10,000 steps a day [de-identified] : mostly carb diet - has to avoid too much protein due to CKD,  [[id number]] : 17 [EyeExamDate] : 10/23 [Change in mental status noted] : No change in mental status noted [Sexually Active] : not sexually active [Reports changes in hearing] : Reports no changes in hearing [Reports changes in vision] : Reports no changes in vision [MammogramComments] : NA [PapSmearDate] : 5/21 [ColonoscopyComments] : NA [BoneDensityComments] : NA [de-identified] : part time [AdvancecareDate] : 09/24

## 2024-09-29 NOTE — PHYSICAL EXAM
[Normal Sclera/Conjunctiva] : normal sclera/conjunctiva [PERRL] : pupils equal round and reactive to light [Clear to Auscultation] : lungs were clear to auscultation bilaterally [Normal] : normal rate, regular rhythm, normal S1 and S2 and no murmur heard [Soft] : abdomen soft [Non Tender] : non-tender [No Rash] : no rash [No Acute Distress] : no acute distress [Well-Appearing] : well-appearing [EOMI] : extraocular movements intact [No Respiratory Distress] : no respiratory distress  [No Lymphadenopathy] : no lymphadenopathy [Supple] : supple [Normal Rate] : normal rate  [Regular Rhythm] : with a regular rhythm [Normal S1, S2] : normal S1 and S2 [Pedal Pulses Present] : the pedal pulses are present [No Edema] : there was no peripheral edema [Normal Appearance] : normal in appearance [No Masses] : no palpable masses [No Axillary Lymphadenopathy] : no axillary lymphadenopathy [Normal Bowel Sounds] : normal bowel sounds [Normal Axillary Nodes] : no axillary lymphadenopathy [Normal Anterior Cervical Nodes] : no anterior cervical lymphadenopathy [No CVA Tenderness] : no CVA  tenderness [No Spinal Tenderness] : no spinal tenderness [No Joint Swelling] : no joint swelling [Grossly Normal Strength/Tone] : grossly normal strength/tone [Coordination Grossly Intact] : coordination grossly intact [Deep Tendon Reflexes (DTR)] : deep tendon reflexes were 2+ and symmetric [Normal Affect] : the affect was normal [de-identified] : several nevi. ~ 2.5 soft tissue nodule Left lower back around waistline

## 2024-10-21 ENCOUNTER — APPOINTMENT (OUTPATIENT)
Dept: RHEUMATOLOGY | Facility: CLINIC | Age: 34
End: 2024-10-21
Payer: COMMERCIAL

## 2024-10-21 VITALS
WEIGHT: 154 LBS | OXYGEN SATURATION: 98 % | SYSTOLIC BLOOD PRESSURE: 124 MMHG | TEMPERATURE: 98.1 F | DIASTOLIC BLOOD PRESSURE: 74 MMHG | RESPIRATION RATE: 16 BRPM | HEART RATE: 80 BPM | HEIGHT: 62 IN | BODY MASS INDEX: 28.34 KG/M2

## 2024-10-21 DIAGNOSIS — M32.9 SYSTEMIC LUPUS ERYTHEMATOSUS, UNSPECIFIED: ICD-10-CM

## 2024-10-21 DIAGNOSIS — I10 ESSENTIAL (PRIMARY) HYPERTENSION: ICD-10-CM

## 2024-10-21 DIAGNOSIS — M32.14 GLOMERULAR DISEASE IN SYSTEMIC LUPUS ERYTHEMATOSUS: ICD-10-CM

## 2024-10-21 PROCEDURE — G2211 COMPLEX E/M VISIT ADD ON: CPT

## 2024-10-21 PROCEDURE — 99215 OFFICE O/P EST HI 40 MIN: CPT

## 2024-10-22 LAB
25(OH)D3 SERPL-MCNC: 37 NG/ML
ALBUMIN SERPL ELPH-MCNC: 4.2 G/DL
ALP BLD-CCNC: 68 U/L
ALT SERPL-CCNC: 12 U/L
ANION GAP SERPL CALC-SCNC: 13 MMOL/L
APPEARANCE: CLEAR
APTT BLD: 34.8 SEC
AST SERPL-CCNC: 18 U/L
B2 GLYCOPROT1 IGA SERPL IA-ACNC: <2 U/ML
B2 GLYCOPROT1 IGG SER-ACNC: <1.4 U/ML
B2 GLYCOPROT1 IGM SER-ACNC: 1.7 U/ML
BACTERIA: NEGATIVE /HPF
BASOPHILS # BLD AUTO: 0.04 K/UL
BASOPHILS NFR BLD AUTO: 0.7 %
BILIRUB SERPL-MCNC: 0.3 MG/DL
BILIRUBIN URINE: NEGATIVE
BLOOD URINE: NEGATIVE
BUN SERPL-MCNC: 29 MG/DL
C3 SERPL-MCNC: 163 MG/DL
C4 SERPL-MCNC: 32 MG/DL
CALCIUM SERPL-MCNC: 9.5 MG/DL
CARDIOLIPIN IGM SER-MCNC: <1.5 MPL U/ML
CARDIOLIPIN IGM SER-MCNC: <1.6 GPL U/ML
CAST: 0 /LPF
CHLORIDE SERPL-SCNC: 105 MMOL/L
CK SERPL-CCNC: 145 U/L
CO2 SERPL-SCNC: 22 MMOL/L
COLOR: YELLOW
CONFIRM: 32 SEC
CREAT SERPL-MCNC: 1.92 MG/DL
CREAT SPEC-SCNC: 88 MG/DL
CREAT/PROT UR: 1 RATIO
DEPRECATED CARDIOLIPIN IGA SER: <2 APL U/ML
DRVVT IMM 1:2 NP PPP: NORMAL
DRVVT SCREEN TO CONFIRM RATIO: 1.03 RATIO
DSDNA AB SER-ACNC: 1 IU/ML
EGFR: 35 ML/MIN/1.73M2
ENA RNP AB SER IA-ACNC: <0.2 AL
ENA SM AB SER IA-ACNC: <0.2 AL
ENA SS-A AB SER IA-ACNC: <0.2 AL
ENA SS-B AB SER IA-ACNC: <0.2 AL
EOSINOPHIL # BLD AUTO: 0.12 K/UL
EOSINOPHIL NFR BLD AUTO: 2.1 %
EPITHELIAL CELLS: 2 /HPF
GLUCOSE QUALITATIVE U: NEGATIVE MG/DL
HCT VFR BLD CALC: 39.4 %
HGB BLD-MCNC: 11.9 G/DL
IMM GRANULOCYTES NFR BLD AUTO: 0.5 %
INR PPP: 0.85 RATIO
KETONES URINE: NEGATIVE MG/DL
LEUKOCYTE ESTERASE URINE: NEGATIVE
LYMPHOCYTES # BLD AUTO: 1.98 K/UL
LYMPHOCYTES NFR BLD AUTO: 35.2 %
MAN DIFF?: NORMAL
MCHC RBC-ENTMCNC: 26.6 PG
MCHC RBC-ENTMCNC: 30.2 GM/DL
MCV RBC AUTO: 87.9 FL
MICROSCOPIC-UA: NORMAL
MONOCYTES # BLD AUTO: 0.4 K/UL
MONOCYTES NFR BLD AUTO: 7.1 %
NEUTROPHILS # BLD AUTO: 3.05 K/UL
NEUTROPHILS NFR BLD AUTO: 54.4 %
NITRITE URINE: NEGATIVE
PH URINE: 7
PLATELET # BLD AUTO: 251 K/UL
POTASSIUM SERPL-SCNC: 4.9 MMOL/L
PROT SERPL-MCNC: 7.1 G/DL
PROT UR-MCNC: 87 MG/DL
PROTEIN URINE: 100 MG/DL
PT BLD: 10.1 SEC
RBC # BLD: 4.48 M/UL
RBC # FLD: 14.6 %
RED BLOOD CELLS URINE: 1 /HPF
RIBOSOMAL P AB SER IA-ACNC: <0.2 AL
SCREEN DRVVT: 42.6 SEC
SILICA CLOTTING TIME INTERPRETATION: ABNORMAL
SILICA CLOTTING TIME S/C: 1.27 RATIO
SODIUM SERPL-SCNC: 140 MMOL/L
SPECIFIC GRAVITY URINE: 1.01
UROBILINOGEN URINE: 0.2 MG/DL
WBC # FLD AUTO: 5.62 K/UL
WHITE BLOOD CELLS URINE: 0 /HPF

## 2024-10-23 LAB — RPR SER-TITR: NORMAL

## 2024-10-29 LAB
PS-PROTHROM CMPLX IGG SERPL IA-ACNC: <10 UNITS
PS-PROTHROM CMPLX IGM SERPL IA-ACNC: 17 UNITS

## 2024-11-05 ENCOUNTER — RX RENEWAL (OUTPATIENT)
Age: 34
End: 2024-11-05

## 2024-11-06 ENCOUNTER — APPOINTMENT (OUTPATIENT)
Dept: INTERNAL MEDICINE | Facility: CLINIC | Age: 34
End: 2024-11-06

## 2024-11-07 ENCOUNTER — APPOINTMENT (OUTPATIENT)
Dept: NEPHROLOGY | Facility: CLINIC | Age: 34
End: 2024-11-07
Payer: COMMERCIAL

## 2024-11-07 VITALS
TEMPERATURE: 97.8 F | BODY MASS INDEX: 28.52 KG/M2 | DIASTOLIC BLOOD PRESSURE: 78 MMHG | HEIGHT: 62 IN | WEIGHT: 155 LBS | OXYGEN SATURATION: 97 % | HEART RATE: 101 BPM | SYSTOLIC BLOOD PRESSURE: 127 MMHG

## 2024-11-07 DIAGNOSIS — R80.9 PROTEINURIA, UNSPECIFIED: ICD-10-CM

## 2024-11-07 DIAGNOSIS — E87.5 HYPERKALEMIA: ICD-10-CM

## 2024-11-07 DIAGNOSIS — M32.14 GLOMERULAR DISEASE IN SYSTEMIC LUPUS ERYTHEMATOSUS: ICD-10-CM

## 2024-11-07 DIAGNOSIS — N18.30 CHRONIC KIDNEY DISEASE, STAGE 3 UNSPECIFIED: ICD-10-CM

## 2024-11-07 DIAGNOSIS — I10 ESSENTIAL (PRIMARY) HYPERTENSION: ICD-10-CM

## 2024-11-07 PROCEDURE — 99215 OFFICE O/P EST HI 40 MIN: CPT

## 2024-11-07 PROCEDURE — G2211 COMPLEX E/M VISIT ADD ON: CPT

## 2024-11-07 RX ORDER — DAPAGLIFLOZIN 10 MG/1
10 TABLET, FILM COATED ORAL DAILY
Qty: 90 | Refills: 3 | Status: ACTIVE | COMMUNITY
Start: 2024-11-07 | End: 1900-01-01

## 2024-11-08 ENCOUNTER — RX RENEWAL (OUTPATIENT)
Age: 34
End: 2024-11-08

## 2024-11-12 ENCOUNTER — APPOINTMENT (OUTPATIENT)
Dept: DERMATOLOGY | Facility: CLINIC | Age: 34
End: 2024-11-12
Payer: COMMERCIAL

## 2024-11-12 DIAGNOSIS — R22.9 LOCALIZED SWELLING, MASS AND LUMP, UNSPECIFIED: ICD-10-CM

## 2024-11-12 DIAGNOSIS — L72.0 EPIDERMAL CYST: ICD-10-CM

## 2024-11-12 DIAGNOSIS — L21.9 SEBORRHEIC DERMATITIS, UNSPECIFIED: ICD-10-CM

## 2024-11-12 DIAGNOSIS — R23.8 OTHER SKIN CHANGES: ICD-10-CM

## 2024-11-12 DIAGNOSIS — D23.9 OTHER BENIGN NEOPLASM OF SKIN, UNSPECIFIED: ICD-10-CM

## 2024-11-12 PROCEDURE — 99203 OFFICE O/P NEW LOW 30 MIN: CPT

## 2024-11-12 RX ORDER — KETOCONAZOLE 20 MG/ML
2 SUSPENSION TOPICAL
Qty: 1 | Refills: 12 | Status: ACTIVE | COMMUNITY
Start: 2024-11-12 | End: 1900-01-01

## 2024-11-21 ENCOUNTER — RX RENEWAL (OUTPATIENT)
Age: 34
End: 2024-11-21

## 2024-11-25 ENCOUNTER — RX RENEWAL (OUTPATIENT)
Age: 34
End: 2024-11-25

## 2025-04-19 ENCOUNTER — DOCTOR'S OFFICE (OUTPATIENT)
Facility: LOCATION | Age: 35
Setting detail: OPHTHALMOLOGY
End: 2025-04-19
Payer: COMMERCIAL

## 2025-04-19 DIAGNOSIS — H11.32: ICD-10-CM

## 2025-04-19 PROCEDURE — 92002 INTRM OPH EXAM NEW PATIENT: CPT | Performed by: OPHTHALMOLOGY

## 2025-04-19 ASSESSMENT — KERATOMETRY
OS_K1POWER_DIOPTERS: DEFER
OD_AXISANGLE_DEGREES: 082
OD_K2POWER_DIOPTERS: 42.75
OD_K1POWER_DIOPTERS: 41.50

## 2025-04-19 ASSESSMENT — REFRACTION_AUTOREFRACTION
OD_SPHERE: 0.00
OD_CYLINDER: -0.75
OS_SPHERE: DEFER
OD_AXIS: 004

## 2025-04-19 ASSESSMENT — TONOMETRY: OS_IOP_MMHG: 18

## 2025-04-19 ASSESSMENT — VISUAL ACUITY
OD_BCVA: 20/20
OS_BCVA: 20/20

## 2025-04-19 ASSESSMENT — CONFRONTATIONAL VISUAL FIELD TEST (CVF)
OS_FINDINGS: FULL
OD_FINDINGS: FULL

## 2025-07-18 NOTE — ED ADULT NURSE NOTE - BIRTH SEX
Problem: Chronic Conditions and Co-morbidities  Goal: Patient's chronic conditions and co-morbidity symptoms are monitored and maintained or improved  Outcome: Progressing     Problem: Discharge Planning  Goal: Discharge to home or other facility with appropriate resources  Outcome: Progressing     Problem: Safety - Violent/Self-destructive Restraint  Goal: Remains free of injury from restraints (Restraint for Violent/Self-Destructive Behavior)  Description: INTERVENTIONS:  1. Determine that de-escalation and other, less restrictive measures have been tried or would not be effective before applying the restraint  2. Identify and document the criteria for restraint  3. Evaluate the patient's condition at the time of restraint application  4. Inform patient/family regarding the reason for restraint/seclusion  5. Q2H: Monitor comfort, nutrition and hydration needs  6. Q15M: Perform safety checks including skin, circulation, sensory, respiratory and psychological status  7. Ensure continuous observation  8. Identify and implement measures to help patient regain control, assess readiness for release and initiate progressive release per policy  Outcome: Progressing      Female